# Patient Record
Sex: FEMALE | Race: WHITE | NOT HISPANIC OR LATINO | Employment: OTHER | URBAN - METROPOLITAN AREA
[De-identification: names, ages, dates, MRNs, and addresses within clinical notes are randomized per-mention and may not be internally consistent; named-entity substitution may affect disease eponyms.]

---

## 2017-02-08 ENCOUNTER — OFFICE VISIT (OUTPATIENT)
Dept: FAMILY MEDICINE CLINIC | Facility: CLINIC | Age: 62
End: 2017-02-08

## 2017-02-08 VITALS
SYSTOLIC BLOOD PRESSURE: 146 MMHG | OXYGEN SATURATION: 98 % | HEIGHT: 62 IN | DIASTOLIC BLOOD PRESSURE: 84 MMHG | BODY MASS INDEX: 28.41 KG/M2 | WEIGHT: 154.4 LBS | HEART RATE: 94 BPM

## 2017-02-08 DIAGNOSIS — K92.1 HEMATOCHEZIA: Primary | ICD-10-CM

## 2017-02-08 PROBLEM — Z80.0 FAMILY HISTORY OF COLON CANCER: Status: ACTIVE | Noted: 2017-02-08

## 2017-02-08 LAB
ALBUMIN SERPL-MCNC: 4.6 G/DL (ref 3.5–5.2)
ALBUMIN/GLOB SERPL: 1.1 G/DL
ALP SERPL-CCNC: 66 U/L (ref 39–117)
ALT SERPL-CCNC: 18 U/L (ref 1–33)
AST SERPL-CCNC: 23 U/L (ref 1–32)
BASOPHILS # BLD AUTO: 0.02 10*3/MM3 (ref 0–0.2)
BASOPHILS NFR BLD AUTO: 0.4 % (ref 0–1.5)
BILIRUB SERPL-MCNC: 0.3 MG/DL (ref 0.1–1.2)
BUN SERPL-MCNC: 13 MG/DL (ref 8–23)
BUN/CREAT SERPL: 15.1 (ref 7–25)
CALCIUM SERPL-MCNC: 9.6 MG/DL (ref 8.6–10.5)
CHLORIDE SERPL-SCNC: 100 MMOL/L (ref 98–107)
CO2 SERPL-SCNC: 27.2 MMOL/L (ref 22–29)
CREAT SERPL-MCNC: 0.86 MG/DL (ref 0.57–1)
DEVELOPER EXPIRATION DATE: ABNORMAL
DEVELOPER LOT NUMBER: ABNORMAL
EOSINOPHIL # BLD AUTO: 0.03 10*3/MM3 (ref 0–0.7)
EOSINOPHIL NFR BLD AUTO: 0.6 % (ref 0.3–6.2)
ERYTHROCYTE [DISTWIDTH] IN BLOOD BY AUTOMATED COUNT: 12.7 % (ref 11.7–13)
EXPIRATION DATE: ABNORMAL
FECAL OCCULT BLOOD SCREEN, POC: POSITIVE
GLOBULIN SER CALC-MCNC: 4.1 GM/DL
GLUCOSE SERPL-MCNC: 117 MG/DL (ref 65–99)
HCT VFR BLD AUTO: 34.9 % (ref 35.6–45.5)
HGB BLD-MCNC: 11.5 G/DL (ref 11.9–15.5)
IMM GRANULOCYTES # BLD: 0 10*3/MM3 (ref 0–0.03)
IMM GRANULOCYTES NFR BLD: 0 % (ref 0–0.5)
IRON SATN MFR SERPL: 26 % (ref 20–50)
IRON SERPL-MCNC: 89 MCG/DL (ref 37–145)
LYMPHOCYTES # BLD AUTO: 0.84 10*3/MM3 (ref 0.9–4.8)
LYMPHOCYTES NFR BLD AUTO: 15.5 % (ref 19.6–45.3)
Lab: ABNORMAL
MCH RBC QN AUTO: 31.9 PG (ref 26.9–32)
MCHC RBC AUTO-ENTMCNC: 33 G/DL (ref 32.4–36.3)
MCV RBC AUTO: 96.7 FL (ref 80.5–98.2)
MONOCYTES # BLD AUTO: 0.36 10*3/MM3 (ref 0.2–1.2)
MONOCYTES NFR BLD AUTO: 6.7 % (ref 5–12)
NEGATIVE CONTROL: NEGATIVE
NEUTROPHILS # BLD AUTO: 4.16 10*3/MM3 (ref 1.9–8.1)
NEUTROPHILS NFR BLD AUTO: 76.8 % (ref 42.7–76)
PLATELET # BLD AUTO: 209 10*3/MM3 (ref 140–500)
POSITIVE CONTROL: POSITIVE
POTASSIUM SERPL-SCNC: 3.8 MMOL/L (ref 3.5–5.2)
PROT SERPL-MCNC: 8.7 G/DL (ref 6–8.5)
RBC # BLD AUTO: 3.61 10*6/MM3 (ref 3.9–5.2)
SODIUM SERPL-SCNC: 139 MMOL/L (ref 136–145)
TIBC SERPL-MCNC: 346 MCG/DL
UIBC SERPL-MCNC: 257 MCG/DL
WBC # BLD AUTO: 5.41 10*3/MM3 (ref 4.5–10.7)

## 2017-02-08 PROCEDURE — 99214 OFFICE O/P EST MOD 30 MIN: CPT | Performed by: INTERNAL MEDICINE

## 2017-02-08 PROCEDURE — 82274 ASSAY TEST FOR BLOOD FECAL: CPT | Performed by: INTERNAL MEDICINE

## 2017-02-08 RX ORDER — MIRTAZAPINE 7.5 MG/1
1 TABLET, FILM COATED ORAL DAILY
Refills: 1 | Status: ON HOLD | COMMUNITY
Start: 2017-01-16 | End: 2017-06-16

## 2017-02-08 RX ORDER — ESCITALOPRAM OXALATE 5 MG/1
1 TABLET ORAL DAILY
Refills: 0 | COMMUNITY
Start: 2017-01-16 | End: 2017-07-19 | Stop reason: DRUGHIGH

## 2017-02-08 NOTE — PROGRESS NOTES
Subjective   Augustina Rodgers is a 61 y.o. female who presents today for:    Rectal Bleeding (Quarter sized on Sunday, and today)    History of Present Illness   She notice blood on the tissue paper 3 days ago and again this morning.  She denies copious amounts of blood mixed in her stool or in the toilet bowl.  She has had rectal pain that started with her BM Sunday and has persisted.    She has a history of polyps and her father had colon cancer (survivor).    Her last colonoscopy was in 2015 at which time a small hyperplastic polyp was found in the cecum.    Ms. Rodgers  reports that she has never smoked. She has never used smokeless tobacco. She reports that she does not drink alcohol or use illicit drugs.         Current Outpatient Prescriptions:   •  ALPRAZolam (XANAX) 0.25 MG tablet, Take 0.25 mg by mouth 2 (Two) Times a Day As Needed for anxiety., Disp: , Rfl:   •  Calcium Acetate, Phos Binder, (CALCIUM ACETATE PO), Take  by mouth., Disp: , Rfl:   •  escitalopram (LEXAPRO) 5 MG tablet, Take 1 tablet by mouth Daily., Disp: , Rfl: 0  •  Multiple Vitamins-Minerals (MULTIVITAMIN ADULT PO), Take  by mouth., Disp: , Rfl:   •  mirtazapine (REMERON) 7.5 MG tablet, Take 1 tablet by mouth Daily., Disp: , Rfl: 1      The following portions of the patient's history were reviewed and updated as appropriate: allergies, current medications, past social history and problem list.    Review of Systems   Constitutional: Negative for chills, fatigue and fever.   Respiratory: Negative for shortness of breath.    Cardiovascular: Negative for chest pain.   Gastrointestinal: Positive for blood in stool and rectal pain. Negative for abdominal pain, constipation and diarrhea.   Genitourinary: Negative for difficulty urinating and hematuria.   Neurological: Negative for dizziness and weakness.   Psychiatric/Behavioral: The patient is nervous/anxious.          Objective   Vitals:    02/08/17 1043   BP: 146/84   BP Location: Left arm  "  Patient Position: Sitting   Cuff Size: Adult   Pulse: 94   SpO2: 98%   Weight: 154 lb 6.4 oz (70 kg)   Height: 62\" (157.5 cm)     144/90 on recheck.    Physical Exam  Well-developed, well-nourished, in no acute distress.  Sclerae are anicteric.  Conjunctivae are pink.  No cervical, supraclavicular, or inguinal lymphadenopathy.  Regular rate and rhythm.  Abdomen is soft, nondistended, with no evidence of hepatosplenomegaly or mass.  I'll sounds are normoactive and there is no abdominal bruit appreciated.  She is nontender to deep palpation throughout.  Small skin tag appreciated near the anus.  No external hemorrhoid appreciated.  No anal fissure appreciated.  Normal sphincter tone.  No internal hemorrhoids or masses appreciated in the rectal vault.  There is Hemoccult positive stool present in the vault.  No gross blood is appreciated.    Assessment/Plan   Augustina was seen today for rectal bleeding.    Diagnoses and all orders for this visit:    Hematochezia  -     CBC & Differential  -     Iron Profile  -     POC Occult Blood, Stool-Diagnostic  -     Comprehensive Metabolic Panel  -     Ambulatory Referral to Gastroenterology     pathology report from the polypectomy in 2015 was reviewed with the patient during today's office visit; it was a hyperplastic polyp.  Exam findings were discussed with the patient today as well.  Because of the Hemoccult positive stool, I have asked her to get the labs done as noted above.  We see evidence of a new anemia on her labs, we will ask Dr. Brown to see her sooner rather than later.  Otherwise, I have encouraged her to drink clear fluids, use fiber based stool softener to keep her BM's soft and regular, and see Dr. Brown in a few weeks.  She may contact us in the interim if any new symptoms develop, or if she notices an increase in blood from her rectum.    I tried to reassure her at length regarding the low likelihood of colon cancer causing this problem, especially since her " last colonoscopy being less than 2 years ago was virtually normal.

## 2017-02-09 NOTE — PROGRESS NOTES
No surprises on the labs today.  Your hemoglobin remains slightly low, as it always has been.  Iron stores are all normal.  CMP was unremarkable; I suspect you were not fasting when we cody them, hence the elevated blood sugar.    I recommend you let me know if the bleeding increases.  Otherwise, see Dr. Robles and let him weigh in on the timing of his next colonoscopy.    TAS

## 2017-03-22 ENCOUNTER — OFFICE VISIT (OUTPATIENT)
Dept: GASTROENTEROLOGY | Facility: CLINIC | Age: 62
End: 2017-03-22

## 2017-03-22 VITALS
HEIGHT: 62 IN | DIASTOLIC BLOOD PRESSURE: 82 MMHG | BODY MASS INDEX: 27.97 KG/M2 | WEIGHT: 152 LBS | SYSTOLIC BLOOD PRESSURE: 136 MMHG

## 2017-03-22 DIAGNOSIS — K63.5 COLON POLYPS: ICD-10-CM

## 2017-03-22 DIAGNOSIS — Z80.0 FH: COLON CANCER: ICD-10-CM

## 2017-03-22 DIAGNOSIS — K62.5 RECTAL BLEEDING: Primary | ICD-10-CM

## 2017-03-22 PROCEDURE — 99214 OFFICE O/P EST MOD 30 MIN: CPT | Performed by: INTERNAL MEDICINE

## 2017-03-22 RX ORDER — SODIUM CHLORIDE 0.9 % (FLUSH) 0.9 %
1-10 SYRINGE (ML) INJECTION AS NEEDED
Status: CANCELLED | OUTPATIENT
Start: 2017-03-22

## 2017-03-22 NOTE — PROGRESS NOTES
Chief Complaint   Patient presents with   • blood on toilet paper       History of Present Illness: 62 yo female who had some rectal bleeding in 2/17. She was found to have heme positive stool then by Dr. Olmos.  No more rectal bleeding or melena .  No change in bowel habits. No diarrhea or constipation. No abdominal or chest pain. No nausea or vomiting. Weight unchanged. Denies NSAID use.     Past Medical History:   Diagnosis Date   • Asthma    • Family history of colon cancer 2/8/2017    Father   • History of colon polyps     Dr. Robles   • Hyperplastic colon polyp    • Internal hemorrhoids    • Mitral valve regurgitation 04/03/2012    mild to moderate   • Tricuspid regurgitation 04/03/2012    mild to moderate   • Tubulovillous adenoma of colon        Past Surgical History:   Procedure Laterality Date   • BREAST BIOPSY     • COLONOSCOPY  04/21/2015    hyperplastic polyp   • COLONOSCOPY  07/25/2011    [facility]   • COLONOSCOPY  04/25/2003    inflamed tubulovillous polyp   [facility]         Current Outpatient Prescriptions:   •  ALPRAZolam (XANAX) 0.25 MG tablet, Take 0.25 mg by mouth 2 (Two) Times a Day As Needed for anxiety., Disp: , Rfl:   •  Calcium Acetate, Phos Binder, (CALCIUM ACETATE PO), Take  by mouth., Disp: , Rfl:   •  escitalopram (LEXAPRO) 5 MG tablet, Take 1 tablet by mouth Daily., Disp: , Rfl: 0  •  Multiple Vitamins-Minerals (MULTIVITAMIN ADULT PO), Take  by mouth., Disp: , Rfl:   •  mirtazapine (REMERON) 7.5 MG tablet, Take 1 tablet by mouth Daily., Disp: , Rfl: 1    Allergies   Allergen Reactions   • Ji-1 [Lidocaine] Other (See Comments)     Elevates heart rate   • Cephalosporins    • Kefzol [Cefazolin]        Family History   Problem Relation Age of Onset   • Alzheimer's disease Mother    • Hypertension Mother    • COPD Mother    • Colon cancer Father    • Hypertension Father    • Anxiety disorder Father    • Colon polyps Brother        Social History     Social History   • Marital status:       Spouse name: N/A   • Number of children: N/A   • Years of education: N/A     Occupational History   • Not on file.     Social History Main Topics   • Smoking status: Never Smoker   • Smokeless tobacco: Never Used   • Alcohol use No   • Drug use: No   • Sexual activity: Not on file     Other Topics Concern   • Not on file     Social History Narrative       Review of Systems   Gastrointestinal: Positive for anal bleeding.   All other systems reviewed and are negative.      Vitals:    03/22/17 1001   BP: 136/82       Physical Exam   Constitutional: She is oriented to person, place, and time. She appears well-developed and well-nourished. No distress.   HENT:   Head: Normocephalic and atraumatic. Hair is normal.   Right Ear: Hearing, tympanic membrane, external ear and ear canal normal. No drainage. No decreased hearing is noted.   Left Ear: Hearing, tympanic membrane, external ear and ear canal normal. No decreased hearing is noted.   Nose: No nasal deformity.   Mouth/Throat: Oropharynx is clear and moist.   Eyes: Conjunctivae, EOM and lids are normal. Pupils are equal, round, and reactive to light. Right eye exhibits no discharge. Left eye exhibits no discharge.   Neck: Normal range of motion. Neck supple. No JVD present. No tracheal deviation present. No thyromegaly present.   Cardiovascular: Normal rate, regular rhythm, normal heart sounds, intact distal pulses and normal pulses.  Exam reveals no gallop and no friction rub.    No murmur heard.  Pulmonary/Chest: Effort normal and breath sounds normal. No respiratory distress. She has no wheezes. She has no rales. She exhibits no tenderness.   Abdominal: Soft. Bowel sounds are normal. She exhibits no distension and no mass. There is no tenderness. There is no rebound and no guarding. No hernia.   Musculoskeletal: Normal range of motion. She exhibits no edema, tenderness or deformity.   Lymphadenopathy:     She has no cervical adenopathy.   Neurological:  She is alert and oriented to person, place, and time. She has normal reflexes. She displays normal reflexes. No cranial nerve deficit. She exhibits normal muscle tone. Coordination normal.   Skin: Skin is warm and dry. No rash noted. She is not diaphoretic. No erythema.   Psychiatric: She has a normal mood and affect. Her behavior is normal. Judgment and thought content normal.   Vitals reviewed.      Augustina was seen today for blood on toilet paper.    Diagnoses and all orders for this visit:    Rectal bleeding  -     Case Request; Standing  -     sodium chloride 0.9 % flush 1-10 mL; Infuse 1-10 mL into a venous catheter As Needed for Line Care.  -     Case Request    Colon polyps  -     Case Request; Standing  -     sodium chloride 0.9 % flush 1-10 mL; Infuse 1-10 mL into a venous catheter As Needed for Line Care.  -     Case Request    FH: colon cancer  -     Case Request; Standing  -     sodium chloride 0.9 % flush 1-10 mL; Infuse 1-10 mL into a venous catheter As Needed for Line Care.  -     Case Request    Other orders  -     Implement Anesthesia orders day of procedure.; Standing  -     Obtain informed consent; Standing  -     Verify bowel prep was successful; Standing  -     Give tap water enema if bowel prep was insufficient; Standing  -     Insert Peripheral IV; Standing  -     Saline Lock & Maintain IV Access; Standing     Assessment:  1) h/o colon polyps  2) FH (dad) colon cancer  3) FH (3 bros) colon polyps.  4) Rectal bleeding and heme pos stool    Recommendations:  1) Colonoscopy.    No Follow-up on file.

## 2017-04-12 DIAGNOSIS — Z00.00 ROUTINE GENERAL MEDICAL EXAMINATION AT A HEALTH CARE FACILITY: Primary | ICD-10-CM

## 2017-06-16 ENCOUNTER — ANESTHESIA (OUTPATIENT)
Dept: GASTROENTEROLOGY | Facility: HOSPITAL | Age: 62
End: 2017-06-16

## 2017-06-16 ENCOUNTER — ANESTHESIA EVENT (OUTPATIENT)
Dept: GASTROENTEROLOGY | Facility: HOSPITAL | Age: 62
End: 2017-06-16

## 2017-06-16 ENCOUNTER — HOSPITAL ENCOUNTER (OUTPATIENT)
Facility: HOSPITAL | Age: 62
Setting detail: HOSPITAL OUTPATIENT SURGERY
Discharge: HOME | End: 2017-06-16
Attending: INTERNAL MEDICINE | Admitting: INTERNAL MEDICINE

## 2017-06-16 VITALS
HEIGHT: 62 IN | BODY MASS INDEX: 28.73 KG/M2 | HEART RATE: 64 BPM | WEIGHT: 156.13 LBS | DIASTOLIC BLOOD PRESSURE: 72 MMHG | SYSTOLIC BLOOD PRESSURE: 117 MMHG | TEMPERATURE: 98 F | RESPIRATION RATE: 16 BRPM | OXYGEN SATURATION: 100 %

## 2017-06-16 DIAGNOSIS — Z80.0 FH: COLON CANCER: ICD-10-CM

## 2017-06-16 DIAGNOSIS — K62.5 RECTAL BLEEDING: ICD-10-CM

## 2017-06-16 DIAGNOSIS — K63.5 COLON POLYPS: ICD-10-CM

## 2017-06-16 PROCEDURE — 45380 COLONOSCOPY AND BIOPSY: CPT | Performed by: INTERNAL MEDICINE

## 2017-06-16 PROCEDURE — 88305 TISSUE EXAM BY PATHOLOGIST: CPT | Performed by: INTERNAL MEDICINE

## 2017-06-16 PROCEDURE — 25010000002 PROPOFOL 10 MG/ML EMULSION: Performed by: ANESTHESIOLOGY

## 2017-06-16 RX ORDER — PROPOFOL 10 MG/ML
VIAL (ML) INTRAVENOUS AS NEEDED
Status: DISCONTINUED | OUTPATIENT
Start: 2017-06-16 | End: 2017-06-16 | Stop reason: SURG

## 2017-06-16 RX ORDER — SODIUM CHLORIDE, SODIUM LACTATE, POTASSIUM CHLORIDE, CALCIUM CHLORIDE 600; 310; 30; 20 MG/100ML; MG/100ML; MG/100ML; MG/100ML
1000 INJECTION, SOLUTION INTRAVENOUS CONTINUOUS PRN
Status: DISCONTINUED | OUTPATIENT
Start: 2017-06-16 | End: 2017-06-16 | Stop reason: HOSPADM

## 2017-06-16 RX ORDER — SODIUM CHLORIDE 0.9 % (FLUSH) 0.9 %
3 SYRINGE (ML) INJECTION AS NEEDED
Status: DISCONTINUED | OUTPATIENT
Start: 2017-06-16 | End: 2017-06-16 | Stop reason: HOSPADM

## 2017-06-16 RX ORDER — SODIUM CHLORIDE 0.9 % (FLUSH) 0.9 %
1-10 SYRINGE (ML) INJECTION AS NEEDED
Status: DISCONTINUED | OUTPATIENT
Start: 2017-06-16 | End: 2017-06-16 | Stop reason: HOSPADM

## 2017-06-16 RX ADMIN — PROPOFOL 200 MG: 10 INJECTION, EMULSION INTRAVENOUS at 12:19

## 2017-06-16 RX ADMIN — SODIUM CHLORIDE, POTASSIUM CHLORIDE, SODIUM LACTATE AND CALCIUM CHLORIDE 1000 ML: 600; 310; 30; 20 INJECTION, SOLUTION INTRAVENOUS at 12:12

## 2017-06-16 RX ADMIN — PROPOFOL 200 MG: 10 INJECTION, EMULSION INTRAVENOUS at 12:40

## 2017-06-16 NOTE — ANESTHESIA PREPROCEDURE EVALUATION
Anesthesia Evaluation     Patient summary reviewed and Nursing notes reviewed          Airway   Mallampati: II  TM distance: >3 FB  Neck ROM: full  possible difficult intubation  Dental - normal exam     Pulmonary - normal exam   Cardiovascular - normal exam        Neuro/Psych  GI/Hepatic/Renal/Endo      Musculoskeletal     Abdominal  - normal exam    Bowel sounds: normal.   Substance History      OB/GYN          Other                                        Anesthesia Plan    ASA 1     MAC     Anesthetic plan and risks discussed with patient.

## 2017-06-16 NOTE — PLAN OF CARE
Problem: Patient Care Overview (Adult)  Goal: Adult Individualization and Mutuality  Outcome: Ongoing (interventions implemented as appropriate)  Goal: Discharge Needs Assessment  Outcome: Ongoing (interventions implemented as appropriate)    Problem: GI Endoscopy (Adult)  Goal: Signs and Symptoms of Listed Potential Problems Will be Absent or Manageable (GI Endoscopy)  Outcome: Ongoing (interventions implemented as appropriate)    06/16/17 1142   GI Endoscopy   Problems Assessed (GI Endoscopy) all

## 2017-06-16 NOTE — H&P
Chief Complaint   Patient presents with   • blood on toilet paper         History of Present Illness: 60 yo female who had some rectal bleeding in 2/17. She was found to have heme positive stool then by Dr. Olmos. No more rectal bleeding or melena . No change in bowel habits. No diarrhea or constipation. No abdominal or chest pain. No nausea or vomiting. Weight unchanged. Denies NSAID use.       Medical History         Past Medical History:   Diagnosis Date   • Asthma     • Family history of colon cancer 2/8/2017     Father   • History of colon polyps       Dr. Robles   • Hyperplastic colon polyp     • Internal hemorrhoids     • Mitral valve regurgitation 04/03/2012     mild to moderate   • Tricuspid regurgitation 04/03/2012     mild to moderate   • Tubulovillous adenoma of colon               Surgical History          Past Surgical History:   Procedure Laterality Date   • BREAST BIOPSY       • COLONOSCOPY   04/21/2015     hyperplastic polyp   • COLONOSCOPY   07/25/2011     [facility]   • COLONOSCOPY   04/25/2003     inflamed tubulovillous polyp [facility]               Current Outpatient Prescriptions:   • ALPRAZolam (XANAX) 0.25 MG tablet, Take 0.25 mg by mouth 2 (Two) Times a Day As Needed for anxiety., Disp: , Rfl:   • Calcium Acetate, Phos Binder, (CALCIUM ACETATE PO), Take by mouth., Disp: , Rfl:   • escitalopram (LEXAPRO) 5 MG tablet, Take 1 tablet by mouth Daily., Disp: , Rfl: 0  • Multiple Vitamins-Minerals (MULTIVITAMIN ADULT PO), Take by mouth., Disp: , Rfl:   • mirtazapine (REMERON) 7.5 MG tablet, Take 1 tablet by mouth Daily., Disp: , Rfl: 1           Allergies   Allergen Reactions   • Ji-1 [Lidocaine] Other (See Comments)       Elevates heart rate   • Cephalosporins     • Kefzol [Cefazolin]                 Family History   Problem Relation Age of Onset   • Alzheimer's disease Mother     • Hypertension Mother     • COPD Mother     • Colon cancer Father     • Hypertension Father     • Anxiety disorder  Father     • Colon polyps Brother            Social History    Social History            Social History   • Marital status:        Spouse name: N/A   • Number of children: N/A   • Years of education: N/A          Occupational History   • Not on file.           Social History Main Topics   • Smoking status: Never Smoker   • Smokeless tobacco: Never Used   • Alcohol use No   • Drug use: No   • Sexual activity: Not on file           Other Topics Concern   • Not on file      Social History Narrative            Review of Systems   Gastrointestinal: Positive for anal bleeding.   All other systems reviewed and are negative.            Vitals:     03/22/17 1001   BP: 136/82         Physical Exam   Constitutional: She is oriented to person, place, and time. She appears well-developed and well-nourished. No distress.   HENT:   Head: Normocephalic and atraumatic. Hair is normal.   Right Ear: Hearing, tympanic membrane, external ear and ear canal normal. No drainage. No decreased hearing is noted.   Left Ear: Hearing, tympanic membrane, external ear and ear canal normal. No decreased hearing is noted.   Nose: No nasal deformity.   Mouth/Throat: Oropharynx is clear and moist.   Eyes: Conjunctivae, EOM and lids are normal. Pupils are equal, round, and reactive to light. Right eye exhibits no discharge. Left eye exhibits no discharge.   Neck: Normal range of motion. Neck supple. No JVD present. No tracheal deviation present. No thyromegaly present.   Cardiovascular: Normal rate, regular rhythm, normal heart sounds, intact distal pulses and normal pulses. Exam reveals no gallop and no friction rub.   No murmur heard.  Pulmonary/Chest: Effort normal and breath sounds normal. No respiratory distress. She has no wheezes. She has no rales. She exhibits no tenderness.   Abdominal: Soft. Bowel sounds are normal. She exhibits no distension and no mass. There is no tenderness. There is no rebound and no guarding. No hernia.    Musculoskeletal: Normal range of motion. She exhibits no edema, tenderness or deformity.   Lymphadenopathy:   She has no cervical adenopathy.   Neurological: She is alert and oriented to person, place, and time. She has normal reflexes. She displays normal reflexes. No cranial nerve deficit. She exhibits normal muscle tone. Coordination normal.   Skin: Skin is warm and dry. No rash noted. She is not diaphoretic. No erythema.   Psychiatric: She has a normal mood and affect. Her behavior is normal. Judgment and thought content normal.   Vitals reviewed.        Augustina was seen today for blood on toilet paper.     Diagnoses and all orders for this visit:     Rectal bleeding  - Case Request; Standing  - sodium chloride 0.9 % flush 1-10 mL; Infuse 1-10 mL into a venous catheter As Needed for Line Care.  - Case Request     Colon polyps  - Case Request; Standing  - sodium chloride 0.9 % flush 1-10 mL; Infuse 1-10 mL into a venous catheter As Needed for Line Care.  - Case Request     FH: colon cancer  - Case Request; Standing  - sodium chloride 0.9 % flush 1-10 mL; Infuse 1-10 mL into a venous catheter As Needed for Line Care.  - Case Request     Other orders  - Implement Anesthesia orders day of procedure.; Standing  - Obtain informed consent; Standing  - Verify bowel prep was successful; Standing  - Give tap water enema if bowel prep was insufficient; Standing  - Insert Peripheral IV; Standing  - Saline Lock & Maintain IV Access; Standing     Assessment:  1) h/o colon polyps  2) FH (dad) colon cancer  3) FH (3 bros) colon polyps.  4) Rectal bleeding and heme pos stool     Recommendations:  1) Colonoscopy.     6/16/17 - No change from the above Lara Robles M.D.

## 2017-06-16 NOTE — ANESTHESIA POSTPROCEDURE EVALUATION
Patient: Augustina Rodgers    Procedure Summary     Date Anesthesia Start Anesthesia Stop Room / Location    06/16/17 1219 1244  [facility] /  [facility]       Procedure Diagnosis Surgeon Provider    COLONOSCOPY TO CECUM ANT TERMINAL ILEUM WITH COLD BIOPSY POLYPECTOMIES (N/A ) Rectal bleeding; Colon polyps; FH: colon cancer  (Rectal bleeding [K62.5]; Colon polyps [K63.5]; FH: colon cancer [Z80.0]) MD Jerri Lomax MD          Anesthesia Type: MAC  Last vitals  /72 (06/16/17 1305)    Temp      Pulse 64 (06/16/17 1305)   Resp 16 (06/16/17 1305)    SpO2 100 % (06/16/17 1305)      Post Anesthesia Care and Evaluation    Patient location during evaluation: PHASE II  Patient participation: complete - patient participated  Level of consciousness: awake  Pain score: 0  Pain management: adequate  Airway patency: patent  Anesthetic complications: No anesthetic complications    Cardiovascular status: acceptable  Respiratory status: acceptable  Hydration status: acceptable

## 2017-06-19 LAB
CYTO UR: NORMAL
LAB AP CASE REPORT: NORMAL
Lab: NORMAL
PATH REPORT.FINAL DX SPEC: NORMAL
PATH REPORT.GROSS SPEC: NORMAL

## 2017-06-26 NOTE — PROGRESS NOTES
Tell her that the colon polyps that were removed were not cancerous but two of them were precancereous. I recommend a repeat c/s in one year. mayra

## 2017-06-30 ENCOUNTER — TELEPHONE (OUTPATIENT)
Dept: GASTROENTEROLOGY | Facility: CLINIC | Age: 62
End: 2017-06-30

## 2017-06-30 NOTE — TELEPHONE ENCOUNTER
Call to pt.  Advise per Dr Robles that the colon polyps that were removed were not cancerous, but two were precancerous.  A repeat c/s in 1 yr is recommended.  Pt verb understanding.    C/s for 6/16/18 placed in recall.

## 2017-06-30 NOTE — TELEPHONE ENCOUNTER
----- Message from Xavier Robles MD sent at 6/25/2017  8:51 PM EDT -----  Tell her that the colon polyps that were removed were not cancerous but two of them were precancereous. I recommend a repeat c/s in one year. mayra

## 2017-07-13 LAB
ALBUMIN SERPL-MCNC: 4.4 G/DL (ref 3.6–4.8)
ALBUMIN/GLOB SERPL: 1.2 {RATIO} (ref 1.2–2.2)
ALP SERPL-CCNC: 57 IU/L (ref 39–117)
ALT SERPL-CCNC: 14 IU/L (ref 0–32)
APPEARANCE UR: CLEAR
AST SERPL-CCNC: 22 IU/L (ref 0–40)
BILIRUB SERPL-MCNC: 0.4 MG/DL (ref 0–1.2)
BILIRUB UR QL STRIP: NEGATIVE
BUN SERPL-MCNC: 18 MG/DL (ref 8–27)
BUN/CREAT SERPL: 21 (ref 12–28)
CALCIUM SERPL-MCNC: 9.2 MG/DL (ref 8.7–10.3)
CHLORIDE SERPL-SCNC: 100 MMOL/L (ref 96–106)
CHOLEST SERPL-MCNC: 205 MG/DL (ref 100–199)
CO2 SERPL-SCNC: 22 MMOL/L (ref 18–29)
COLOR UR: YELLOW
CREAT SERPL-MCNC: 0.84 MG/DL (ref 0.57–1)
ERYTHROCYTE [DISTWIDTH] IN BLOOD BY AUTOMATED COUNT: 13.1 % (ref 12.3–15.4)
GLOBULIN SER CALC-MCNC: 3.7 G/DL (ref 1.5–4.5)
GLUCOSE SERPL-MCNC: 104 MG/DL (ref 65–99)
GLUCOSE UR QL: NEGATIVE
HCT VFR BLD AUTO: 33.8 % (ref 34–46.6)
HDLC SERPL-MCNC: 48 MG/DL
HGB BLD-MCNC: 11.5 G/DL (ref 11.1–15.9)
HGB UR QL STRIP: NEGATIVE
KETONES UR QL STRIP: NEGATIVE
LDLC SERPL CALC-MCNC: 137 MG/DL (ref 0–99)
LEUKOCYTE ESTERASE UR QL STRIP: NEGATIVE
MCH RBC QN AUTO: 32 PG (ref 26.6–33)
MCHC RBC AUTO-ENTMCNC: 34 G/DL (ref 31.5–35.7)
MCV RBC AUTO: 94 FL (ref 79–97)
MICRO URNS: NORMAL
NITRITE UR QL STRIP: NEGATIVE
PH UR STRIP: 6 [PH] (ref 5–7.5)
PLATELET # BLD AUTO: 197 X10E3/UL (ref 150–379)
POTASSIUM SERPL-SCNC: 4.1 MMOL/L (ref 3.5–5.2)
PROT SERPL-MCNC: 8.1 G/DL (ref 6–8.5)
PROT UR QL STRIP: NEGATIVE
RBC # BLD AUTO: 3.59 X10E6/UL (ref 3.77–5.28)
SODIUM SERPL-SCNC: 138 MMOL/L (ref 134–144)
SP GR UR: 1.02 (ref 1–1.03)
TRIGL SERPL-MCNC: 100 MG/DL (ref 0–149)
UROBILINOGEN UR STRIP-MCNC: 0.2 MG/DL (ref 0.2–1)
VLDLC SERPL CALC-MCNC: 20 MG/DL (ref 5–40)
WBC # BLD AUTO: 4.4 X10E3/UL (ref 3.4–10.8)

## 2017-07-19 ENCOUNTER — OFFICE VISIT (OUTPATIENT)
Dept: FAMILY MEDICINE CLINIC | Facility: CLINIC | Age: 62
End: 2017-07-19

## 2017-07-19 VITALS
SYSTOLIC BLOOD PRESSURE: 132 MMHG | BODY MASS INDEX: 29.24 KG/M2 | HEIGHT: 62 IN | OXYGEN SATURATION: 95 % | DIASTOLIC BLOOD PRESSURE: 84 MMHG | WEIGHT: 158.9 LBS | HEART RATE: 70 BPM

## 2017-07-19 DIAGNOSIS — Z00.00 ROUTINE GENERAL MEDICAL EXAMINATION AT HEALTH CARE FACILITY: Primary | ICD-10-CM

## 2017-07-19 PROCEDURE — 99396 PREV VISIT EST AGE 40-64: CPT | Performed by: INTERNAL MEDICINE

## 2017-07-19 RX ORDER — TRAZODONE HYDROCHLORIDE 50 MG/1
TABLET ORAL
Refills: 2 | COMMUNITY
Start: 2017-04-17 | End: 2018-04-30

## 2017-07-19 RX ORDER — ESCITALOPRAM OXALATE 10 MG/1
TABLET ORAL
Refills: 1 | COMMUNITY
Start: 2017-07-05 | End: 2018-09-28 | Stop reason: SDUPTHER

## 2018-03-20 ENCOUNTER — TRANSCRIBE ORDERS (OUTPATIENT)
Dept: ADMINISTRATIVE | Facility: HOSPITAL | Age: 63
End: 2018-03-20

## 2018-03-20 DIAGNOSIS — Z12.31 VISIT FOR SCREENING MAMMOGRAM: Primary | ICD-10-CM

## 2018-04-09 ENCOUNTER — HOSPITAL ENCOUNTER (OUTPATIENT)
Dept: MAMMOGRAPHY | Facility: HOSPITAL | Age: 63
Discharge: HOME | End: 2018-04-09
Attending: OBSTETRICS & GYNECOLOGY | Admitting: OBSTETRICS & GYNECOLOGY

## 2018-04-09 DIAGNOSIS — Z12.31 VISIT FOR SCREENING MAMMOGRAM: ICD-10-CM

## 2018-04-09 PROCEDURE — 77067 SCR MAMMO BI INCL CAD: CPT

## 2018-04-09 PROCEDURE — 77063 BREAST TOMOSYNTHESIS BI: CPT

## 2018-04-30 ENCOUNTER — OFFICE VISIT (OUTPATIENT)
Dept: OBSTETRICS AND GYNECOLOGY | Age: 63
End: 2018-04-30

## 2018-04-30 VITALS — WEIGHT: 170 LBS | BODY MASS INDEX: 31.09 KG/M2 | SYSTOLIC BLOOD PRESSURE: 124 MMHG | DIASTOLIC BLOOD PRESSURE: 70 MMHG

## 2018-04-30 DIAGNOSIS — Z00.00 ANNUAL PHYSICAL EXAM: Primary | ICD-10-CM

## 2018-04-30 DIAGNOSIS — F41.9 ANXIETY: ICD-10-CM

## 2018-04-30 PROCEDURE — 99396 PREV VISIT EST AGE 40-64: CPT | Performed by: OBSTETRICS & GYNECOLOGY

## 2018-04-30 NOTE — PROGRESS NOTES
Subjective   Augustina Rodgers is a 63 y.o. female is being seen today for an annual exam.  Chief Complaint   Patient presents with   • Gynecologic Exam     Last Pap 16 Neg Colon 2017 Dexa    .    History of Present Illness    The following portions of the patient's history were reviewed and updated as appropriate: allergies, current medications, past family history, past medical history, past social history, past surgical history and problem list.    Vitals:    18 1043   BP: 124/70       PAST MEDICAL HISTORY  Past Medical History:   Diagnosis Date   • Asthma    • Family history of colon cancer 2017    Father   • History of colon polyps     Dr. Robles   • Hyperplastic colon polyp    • Internal hemorrhoids    • Mitral valve regurgitation 2012    mild to moderate   • Posterior vitreous detachment of right eye 2016    Dr. Bev Wilder   • Tricuspid regurgitation 2012    mild to moderate   • Tubulovillous adenoma of colon      OB History      Para Term  AB Living    2 2 2       SAB TAB Ectopic Molar Multiple Live Births                 Past Surgical History:   Procedure Laterality Date   • BREAST BIOPSY     • BREAST CYST ASPIRATION     •  SECTION     • COLONOSCOPY  2015    hyperplastic polyp   • [facility]OSCOPY  2011    [facility]   • COLONOSCOPY  2003    inflamed tubulovillous polyp   [facility]   • COLONOSCOPY N/A 2017    Procedure: COLONOSCOPY TO CECUM ANT TERMINAL ILEUM WITH COLD BIOPSY POLYPECTOMIES;  Surgeon: Xavier Robles MD;  Location: [facility] END[facility];  Service:    • COLONOSCOPY  2017    Dr. Robles [facility] 3 polyps   • FERTILITY SURGERY      laproscopic   • TONSILLECTOMY       Family History   Problem Relation Age of Onset   • Alzheimer's disease Mother    • Hypertension Mother    • COPD Mother    • Colon cancer Father    • Hypertension Father    • Anxiety disorder Father    • Colon polyps Brother    • Breast cancer Paternal Grandmother       History   Smoking Status   • Never Smoker   Smokeless Tobacco   • Never Used       Current Outpatient Prescriptions:   •  ALPRAZolam (XANAX) 0.25 MG tablet, Take 0.25 mg by mouth 2 (Two) Times a Day As Needed for anxiety., Disp: , Rfl:   •  Calcium Acetate, Phos Binder, (CALCIUM ACETATE PO), Take  by mouth., Disp: , Rfl:   •  escitalopram (LEXAPRO) 10 MG tablet, TK 1 T PO QD, Disp: , Rfl: 1  •  Multiple Vitamins-Minerals (MULTIVITAMIN ADULT PO), Take  by mouth., Disp: , Rfl:   •  traZODone (DESYREL) 50 MG tablet, TK SS TO ONE T PO HS UTD, Disp: , Rfl: 2  Immunization History   Administered Date(s) Administered   • DTaP 03/09/2009   • Influenza, Quadrivalent 10/04/2016, 10/05/2017       Review of Systems    Objective   Physical Exam      Assessment/Plan   There are no diagnoses linked to this encounter.

## 2018-04-30 NOTE — PROGRESS NOTES
Subjective   Augustina Rodgers is a 63 y.o. female is being seen today for   Chief Complaint   Patient presents with   • Gynecologic Exam     Last Pap 16 Neg Colon 2017 Dexa 2016   .    History of Present Illness  Patient is here for an annual exam.  Overall she doing very well reports no new problems this year or today.  She did have a colonoscopy last year showing multiple polyps including a tubular adenoma so she is going back this year for a repeat.  No new family history suffer the additional grandchildren she has to another one on the way.  One of her daughters will be moving to [place] for 6 years as her  is in a orthopedic residency in fellowship.  She exercises needs fairly well her bowels and bladder also work well.  No other complaints  The following portions of the patient's history were reviewed and updated as appropriate: allergies, current medications, past family history, past medical history, past social history, past surgical history and problem list.    Vitals:    18 1043   BP: 124/70       PAST MEDICAL HISTORY  Past Medical History:   Diagnosis Date   • Asthma    • Family history of colon cancer 2017    Father   • History of colon polyps     Dr. Robles   • Hyperplastic colon polyp    • Internal hemorrhoids    • Mitral valve regurgitation 2012    mild to moderate   • Posterior vitreous detachment of right eye 2016    Dr. Bev Wilder   • Tricuspid regurgitation 2012    mild to moderate   • Tubulovillous adenoma of colon      OB History      Para Term  AB Living    2 2 2       SAB TAB Ectopic Molar Multiple Live Births                 Past Surgical History:   Procedure Laterality Date   • BREAST BIOPSY     • BREAST CYST ASPIRATION     •  SECTION     • COLONOSCOPY  2015    hyperplastic polyp   • COLONOSCOPY  2011    [facility]   • COLONOSCOPY  2003    inflamed tubulovillous polyp   [facility]   • COLONOSCOPY N/A 2017     Procedure: COLONOSCOPY TO CECUM ANT TERMINAL ILEUM WITH COLD BIOPSY POLYPECTOMIES;  Surgeon: Xavier Robles MD;  Location: [facility] ENDOSCOPY;  Service:    • COLONOSCOPY  06/16/2017    Dr. Robles [facility] 3 polyps   • FERTILITY SURGERY      laproscopic   • TONSILLECTOMY       Family History   Problem Relation Age of Onset   • Alzheimer's disease Mother    • Hypertension Mother    • COPD Mother    • Colon cancer Father    • Hypertension Father    • Anxiety disorder Father    • Colon polyps Brother    • Breast cancer Paternal Grandmother      History   Smoking Status   • Never Smoker   Smokeless Tobacco   • Never Used       Current Outpatient Prescriptions:   •  ALPRAZolam (XANAX) 0.25 MG tablet, Take 0.25 mg by mouth 2 (Two) Times a Day As Needed for anxiety., Disp: , Rfl:   •  Calcium Acetate, Phos Binder, (CALCIUM ACETATE PO), Take  by mouth., Disp: , Rfl:   •  escitalopram (LEXAPRO) 10 MG tablet, TK 1 T PO QD, Disp: , Rfl: 1  •  Multiple Vitamins-Minerals (MULTIVITAMIN ADULT PO), Take  by mouth., Disp: , Rfl:   Immunization History   Administered Date(s) Administered   • DTaP 03/09/2009   • Influenza, Quadrivalent 10/04/2016, 10/05/2017       Review of Systems   Constitutional: Negative for chills, fatigue, fever and unexpected weight change.   Respiratory: Negative for shortness of breath and wheezing.    Cardiovascular: Negative for chest pain.   Gastrointestinal: Negative for abdominal distention, abdominal pain, blood in stool, constipation, diarrhea and nausea.   Genitourinary: Negative for difficulty urinating, dyspareunia, dysuria, frequency, hematuria, menstrual problem, pelvic pain, urgency and vaginal discharge.   Skin: Negative for rash.   Psychiatric/Behavioral: The patient is nervous/anxious.        Objective   Physical Exam   Constitutional: She is oriented to person, place, and time. Vital signs are normal. She appears well-developed and well-nourished.   Neck: No thyromegaly present.   Cardiovascular: Normal  rate, regular rhythm and normal heart sounds.    Pulmonary/Chest: Effort normal. Right breast exhibits no inverted nipple, no mass, no nipple discharge, no skin change and no tenderness. Left breast exhibits no inverted nipple, no mass, no nipple discharge, no skin change and no tenderness. Breasts are symmetrical. There is no breast swelling.   Abdominal: Soft.   Genitourinary: Vagina normal and uterus normal. No breast tenderness, discharge or bleeding. Pelvic exam was performed with patient supine. No labial fusion. There is no rash, tenderness, lesion or injury on the right labia. There is no rash, tenderness, lesion or injury on the left labia. Cervix exhibits no motion tenderness, no discharge and no friability. Right adnexum displays no mass, no tenderness and no fullness. Left adnexum displays no mass, no tenderness and no fullness.   Neurological: She is alert and oriented to person, place, and time.   Psychiatric: She has a normal mood and affect.   Vitals reviewed.        Assessment/Plan   Augustina was seen today for gynecologic exam.    Diagnoses and all orders for this visit:    Annual physical exam    Anxiety        Normal exam today.  Pap smear will be due next year.:colon  As mentioned above up-to-date bone density and 16 up-to-date and mammogram up-to-date.  Come back in a year.  And again diet and exercise were discussed

## 2018-06-27 DIAGNOSIS — Z00.00 ROUTINE GENERAL MEDICAL EXAMINATION AT A HEALTH CARE FACILITY: Primary | ICD-10-CM

## 2018-07-15 ENCOUNTER — RESULTS ENCOUNTER (OUTPATIENT)
Dept: FAMILY MEDICINE CLINIC | Facility: CLINIC | Age: 63
End: 2018-07-15

## 2018-07-15 DIAGNOSIS — Z00.00 ROUTINE GENERAL MEDICAL EXAMINATION AT A HEALTH CARE FACILITY: ICD-10-CM

## 2018-09-28 ENCOUNTER — OFFICE VISIT (OUTPATIENT)
Dept: FAMILY MEDICINE CLINIC | Facility: CLINIC | Age: 63
End: 2018-09-28

## 2018-09-28 VITALS
BODY MASS INDEX: 34.23 KG/M2 | OXYGEN SATURATION: 97 % | RESPIRATION RATE: 22 BRPM | DIASTOLIC BLOOD PRESSURE: 80 MMHG | SYSTOLIC BLOOD PRESSURE: 137 MMHG | HEART RATE: 69 BPM | HEIGHT: 62 IN | WEIGHT: 186 LBS | TEMPERATURE: 97.8 F

## 2018-09-28 DIAGNOSIS — F41.9 ANXIETY: Primary | ICD-10-CM

## 2018-09-28 PROCEDURE — 99213 OFFICE O/P EST LOW 20 MIN: CPT | Performed by: NURSE PRACTITIONER

## 2018-09-28 RX ORDER — ESCITALOPRAM OXALATE 10 MG/1
10 TABLET ORAL DAILY
Qty: 90 TABLET | Refills: 1 | Status: SHIPPED | OUTPATIENT
Start: 2018-09-28 | End: 2019-03-22 | Stop reason: SDUPTHER

## 2018-09-28 RX ORDER — INFLUENZA A VIRUS A/MICHIGAN/45/2015 X-275 (H1N1) ANTIGEN (FORMALDEHYDE INACTIVATED), INFLUENZA A VIRUS A/SINGAPORE/INFIMH-16-0019/2016 IVR-186 (H3N2) ANTIGEN (FORMALDEHYDE INACTIVATED), INFLUENZA B VIRUS B/PHUKET/3073/2013 ANTIGEN (FORMALDEHYDE INACTIVATED), AND INFLUENZA B VIRUS B/MARYLAND/15/2016 BX-69A ANTIGEN (FORMALDEHYDE INACTIVATED) 15; 15; 15; 15 UG/.5ML; UG/.5ML; UG/.5ML; UG/.5ML
INJECTION, SUSPENSION INTRAMUSCULAR
Refills: 0 | COMMUNITY
Start: 2018-09-23 | End: 2018-12-04

## 2018-09-28 RX ORDER — HEPATITIS A VACCINE 1440 [IU]/ML
INJECTION, SUSPENSION INTRAMUSCULAR
Refills: 0 | COMMUNITY
Start: 2018-09-23 | End: 2018-12-04

## 2018-10-01 NOTE — PROGRESS NOTES
Subjective   Augustina Rodgers is a 63 y.o. female.     Chief Complaint   Patient presents with   • Anxiety     Ms Rodgers presents today to get her anxiety medications filled. She had been under the care of Dr. Lopez whom recently retired. She is a patient of Dr. Olmos, this is a new problem to me. She is out of Lexapro and needs a refill on  this medication before her next visit with Dr. Olmos.     I have reviewed the patient's medical history in detail and updated the computerized patient record.    The following portions of the patient's history were reviewed and updated as appropriate: allergies, current medications, past family history, past medical history, past social history, past surgical history and problem list.       Current Outpatient Prescriptions:   •  ALPRAZolam (XANAX) 0.25 MG tablet, Take 0.25 mg by mouth 2 (Two) Times a Day As Needed for anxiety., Disp: , Rfl:   •  BOOSTRIX 5-2.5-18.5 injection, ADM 0.5ML IM UTD, Disp: , Rfl: 0  •  Calcium Acetate, Phos Binder, (CALCIUM ACETATE PO), Take  by mouth., Disp: , Rfl:   •  escitalopram (LEXAPRO) 10 MG tablet, Take 1 tablet by mouth Daily., Disp: 90 tablet, Rfl: 1  •  FLUZONE QUADRIVALENT 0.5 ML suspension prefilled syringe injection, ADM 0.5ML IM UTD, Disp: , Rfl: 0  •  HAVRIX 1440 EL U/ML vaccine, ADM 1ML IM UTD, Disp: , Rfl: 0  •  Multiple Vitamins-Minerals (MULTIVITAMIN ADULT PO), Take  by mouth., Disp: , Rfl:     Review of Systems   Constitutional: Negative.    Respiratory: Negative.    Cardiovascular: Negative.    Neurological: Negative.    Psychiatric/Behavioral: The patient is nervous/anxious.        Objective    Vitals:    09/28/18 1421   BP: 137/80   Pulse: 69   Resp: 22   Temp: 97.8 °F (36.6 °C)   SpO2: 97%     Physical Exam   Constitutional: She is oriented to person, place, and time. She appears well-developed and well-nourished.   Cardiovascular: Normal rate, regular rhythm and normal heart sounds.    Neurological: She is alert and  oriented to person, place, and time.   Skin: Skin is warm and dry.   Psychiatric: She has a normal mood and affect. Her behavior is normal. Judgment and thought content normal.   Vitals reviewed.      Assessment/Plan   Augustina was seen today for anxiety.    Diagnoses and all orders for this visit:    Anxiety    Other orders  -     escitalopram (LEXAPRO) 10 MG tablet; Take 1 tablet by mouth Daily.    1. Her mood and affect are bright. She has no concerns.  2. I have refilled her Escitalopram 10 mg daily x 90 days.  3. She is to follow up as scheduled with Dr. Olmos in December.

## 2018-11-27 DIAGNOSIS — Z00.00 ROUTINE GENERAL MEDICAL EXAMINATION AT HEALTH CARE FACILITY: Primary | ICD-10-CM

## 2018-11-28 ENCOUNTER — RESULTS ENCOUNTER (OUTPATIENT)
Dept: FAMILY MEDICINE CLINIC | Facility: CLINIC | Age: 63
End: 2018-11-28

## 2018-11-28 DIAGNOSIS — Z00.00 ROUTINE GENERAL MEDICAL EXAMINATION AT HEALTH CARE FACILITY: ICD-10-CM

## 2018-11-30 LAB
ALBUMIN SERPL-MCNC: 4.3 G/DL (ref 3.6–4.8)
ALBUMIN/GLOB SERPL: 1.1 {RATIO} (ref 1.2–2.2)
ALP SERPL-CCNC: 64 IU/L (ref 39–117)
ALT SERPL-CCNC: 30 IU/L (ref 0–32)
APPEARANCE UR: CLEAR
AST SERPL-CCNC: 30 IU/L (ref 0–40)
BILIRUB SERPL-MCNC: 0.4 MG/DL (ref 0–1.2)
BILIRUB UR QL STRIP: NEGATIVE
BUN SERPL-MCNC: 17 MG/DL (ref 8–27)
BUN/CREAT SERPL: 20 (ref 12–28)
CALCIUM SERPL-MCNC: 9.3 MG/DL (ref 8.7–10.3)
CHLORIDE SERPL-SCNC: 102 MMOL/L (ref 96–106)
CHOLEST SERPL-MCNC: 205 MG/DL (ref 100–199)
CO2 SERPL-SCNC: 23 MMOL/L (ref 20–29)
COLOR UR: YELLOW
CREAT SERPL-MCNC: 0.87 MG/DL (ref 0.57–1)
ERYTHROCYTE [DISTWIDTH] IN BLOOD BY AUTOMATED COUNT: 13.3 % (ref 12.3–15.4)
GLOBULIN SER CALC-MCNC: 3.8 G/DL (ref 1.5–4.5)
GLUCOSE SERPL-MCNC: 107 MG/DL (ref 65–99)
GLUCOSE UR QL: NEGATIVE
HCT VFR BLD AUTO: 35.6 % (ref 34–46.6)
HDLC SERPL-MCNC: 38 MG/DL
HGB BLD-MCNC: 11.9 G/DL (ref 11.1–15.9)
HGB UR QL STRIP: NEGATIVE
KETONES UR QL STRIP: NEGATIVE
LDLC SERPL CALC-MCNC: 138 MG/DL (ref 0–99)
LEUKOCYTE ESTERASE UR QL STRIP: NEGATIVE
MCH RBC QN AUTO: 31.8 PG (ref 26.6–33)
MCHC RBC AUTO-ENTMCNC: 33.4 G/DL (ref 31.5–35.7)
MCV RBC AUTO: 95 FL (ref 79–97)
MICRO URNS: NORMAL
NITRITE UR QL STRIP: NEGATIVE
PH UR STRIP: 6.5 [PH] (ref 5–7.5)
PLATELET # BLD AUTO: 218 X10E3/UL (ref 150–379)
POTASSIUM SERPL-SCNC: 5 MMOL/L (ref 3.5–5.2)
PROT SERPL-MCNC: 8.1 G/DL (ref 6–8.5)
PROT UR QL STRIP: NEGATIVE
RBC # BLD AUTO: 3.74 X10E6/UL (ref 3.77–5.28)
SODIUM SERPL-SCNC: 140 MMOL/L (ref 134–144)
SP GR UR: 1.01 (ref 1–1.03)
TRIGL SERPL-MCNC: 143 MG/DL (ref 0–149)
UROBILINOGEN UR STRIP-MCNC: 0.2 MG/DL (ref 0.2–1)
VLDLC SERPL CALC-MCNC: 29 MG/DL (ref 5–40)
WBC # BLD AUTO: 3.6 X10E3/UL (ref 3.4–10.8)

## 2018-12-04 ENCOUNTER — OFFICE VISIT (OUTPATIENT)
Dept: FAMILY MEDICINE CLINIC | Facility: CLINIC | Age: 63
End: 2018-12-04

## 2018-12-04 VITALS
SYSTOLIC BLOOD PRESSURE: 118 MMHG | WEIGHT: 187 LBS | HEIGHT: 62 IN | OXYGEN SATURATION: 98 % | HEART RATE: 73 BPM | DIASTOLIC BLOOD PRESSURE: 82 MMHG | BODY MASS INDEX: 34.41 KG/M2

## 2018-12-04 DIAGNOSIS — Z12.11 COLON CANCER SCREENING: ICD-10-CM

## 2018-12-04 DIAGNOSIS — E78.5 DYSLIPIDEMIA: ICD-10-CM

## 2018-12-04 DIAGNOSIS — Z00.00 ROUTINE GENERAL MEDICAL EXAMINATION AT HEALTH CARE FACILITY: Primary | ICD-10-CM

## 2018-12-04 PROCEDURE — 99396 PREV VISIT EST AGE 40-64: CPT | Performed by: INTERNAL MEDICINE

## 2018-12-04 RX ORDER — OMEPRAZOLE 20 MG/1
20 CAPSULE, DELAYED RELEASE ORAL AS NEEDED
COMMUNITY
End: 2019-03-22

## 2018-12-04 RX ORDER — CALCIUM CARBONATE 200(500)MG
1 TABLET,CHEWABLE ORAL AS NEEDED
COMMUNITY
End: 2021-06-10

## 2018-12-04 NOTE — PATIENT INSTRUCTIONS
Personal Prevention Plan of Service     Date of Office Visit:  2018  Encounter Provider:  Attila Olmos MD  Place of Service:  [facility] INTERNAL MEDICINE  Patient Name: Augustina Rodgers  :  1955    This lists the preventive care services that should be considered, and provides dates of when you are due. Items listed as completed are up-to-date and do not require any further intervention.    Health Maintenance   Topic Date Due   • HEPATITIS A VACCINE ADULT (1 of 2) 3//   • SHINGLES VACCINE (SHINGRIX 2 shot series) Now and in 2-6 months   • HEPATITIS C SCREENING  With next labs   • COLONOSCOPY  Due now; call Dr. Robles to schedule.   • ANNUAL PHYSICAL  2019   • PAP SMEAR  2019 / per Dr. Camacho   • MAMMOGRAM  2020   • TDAP/TD VACCINES (3 - Td) 2028   • INFLUENZA VACCINE  Completed       Return in about 1 year (around 2019) for Preventive visit - FASTING LABS PRIOR.

## 2018-12-04 NOTE — PROGRESS NOTES
"Subjective   Augustina Rodgers is a 63 y.o. female who presents today for:    Annual Exam (PHE & review labs)    History of Present Illness     Immunization History   Administered Date(s) Administered   • Flu Mist 10/04/2016, 10/05/2017   • Hepatitis A 09/23/2018   • Influenza, Unspecified 09/23/2018   • Tdap 03/09/2009, 09/23/2018     She sees Dr. Camacho for her gynecologic evaluations. She is up to date regarding her colonoscopies.    Ms. Rodgers  reports that  has never smoked. she has never used smokeless tobacco. She reports that she drinks alcohol. She reports that she does not use drugs.     Allergies   Allergen Reactions   • Ji-1 [Lidocaine] Other (See Comments)     Elevates heart rate   • Cephalosporins    • Kefzol [Cefazolin]        Current Outpatient Medications:   •  ALPRAZolam (XANAX) 0.25 MG tablet, Take 0.25 mg by mouth 2 (Two) Times a Day As Needed for anxiety., Disp: , Rfl:   •  Calcium Acetate, Phos Binder, (CALCIUM ACETATE PO), Take 1 tablet by mouth Daily., Disp: , Rfl:   •  calcium carbonate (TUMS) 500 MG chewable tablet, Chew 1 tablet As Needed for Indigestion or Heartburn., Disp: , Rfl:   •  escitalopram (LEXAPRO) 10 MG tablet, Take 1 tablet by mouth Daily., Disp: 90 tablet, Rfl: 1  •  Multiple Vitamins-Minerals (MULTIVITAMIN ADULT PO), Take  by mouth., Disp: , Rfl:   •  omeprazole (priLOSEC) 20 MG capsule, Take 20 mg by mouth As Needed., Disp: , Rfl:       Review of Systems   Gastrointestinal: Negative for abdominal pain, blood in stool, constipation and diarrhea.        Reflux flaring; no dysphagia; mild, rare odynophagia.   Psychiatric/Behavioral: The patient is not nervous/anxious.    All other systems reviewed and are negative.        Objective   Vitals:    12/04/18 1555   BP: 118/82   BP Location: Right arm   Patient Position: Sitting   Cuff Size: Large Adult   Pulse: 73   SpO2: 98%   Weight: 84.8 kg (187 lb)   Height: 157.5 cm (62\")     Physical Exam   Constitutional: She is oriented to " person, place, and time. She appears well-developed and well-nourished.   Eyes: Conjunctivae are normal. No scleral icterus.   Neck: Carotid bruit is not present. No thyroid mass and no thyromegaly present.   Cardiovascular: Normal rate, regular rhythm, normal heart sounds and intact distal pulses.   No pedal edema.   Pulmonary/Chest: Effort normal and breath sounds normal.   Abdominal: Soft. Bowel sounds are normal. She exhibits no abdominal bruit. There is no tenderness.   Neurological: She is alert and oriented to person, place, and time. She has normal strength.   Psychiatric: She has a normal mood and affect.           Augustina was seen today for annual exam.    Diagnoses and all orders for this visit:    Routine general medical examination at [facility] facility    Colon cancer screening  Comments:  Call Dr. Robles.    Dyslipidemia    We discussed age-appropriate HM issues, including the need for diet changes and resumption of regular exercise.  This should help with the mild dyslipidemia and slightly elevated blood sugar seen on labs done prior to today's visit and reviewed with the patient today.   Rest of the labs looked good.    Tetanus booster is up-to-date. I recommended the shingles vaccine to her today. She will get the second hepatitis A shot at the six-month amira (end of March). We also discussed the use of sunscreen and seatbelts.

## 2019-03-22 ENCOUNTER — OFFICE VISIT (OUTPATIENT)
Dept: FAMILY MEDICINE CLINIC | Facility: CLINIC | Age: 64
End: 2019-03-22

## 2019-03-22 VITALS
OXYGEN SATURATION: 98 % | HEART RATE: 78 BPM | RESPIRATION RATE: 16 BRPM | TEMPERATURE: 98.2 F | SYSTOLIC BLOOD PRESSURE: 120 MMHG | BODY MASS INDEX: 32.57 KG/M2 | WEIGHT: 177 LBS | DIASTOLIC BLOOD PRESSURE: 80 MMHG | HEIGHT: 62 IN

## 2019-03-22 DIAGNOSIS — F41.9 ANXIETY: Primary | ICD-10-CM

## 2019-03-22 DIAGNOSIS — F32.5 MAJOR DEPRESSIVE DISORDER WITH SINGLE EPISODE, IN FULL REMISSION (HCC): ICD-10-CM

## 2019-03-22 PROCEDURE — 99204 OFFICE O/P NEW MOD 45 MIN: CPT | Performed by: INTERNAL MEDICINE

## 2019-03-22 RX ORDER — ESCITALOPRAM OXALATE 10 MG/1
10 TABLET ORAL DAILY
Qty: 90 TABLET | Refills: 2 | Status: SHIPPED | OUTPATIENT
Start: 2019-03-22 | End: 2019-09-30

## 2019-03-22 NOTE — PROGRESS NOTES
Subjective   Augustina Rodgers is a 63 y.o. female. Patient is here today for   Chief Complaint   Patient presents with   • Establish Care     NP   • Anxiety   • Depression          Vitals:    03/22/19 1113   BP: 120/80   Pulse: 78   Resp: 16   Temp: 98.2 °F (36.8 °C)   SpO2: 98%       Past Medical History:   Diagnosis Date   • Anxiety    • Asthma    • Depression    • Family history of colon cancer 2/8/2017    Father   • Heart murmur    • History of colon polyps     Dr. Robles   • Hyperplastic colon polyp    • Internal hemorrhoids    • Mitral valve regurgitation 04/03/2012    mild to moderate   • Posterior vitreous detachment of right eye 09/2016    Dr. Bev Wilder   • Tricuspid regurgitation 04/03/2012    mild to moderate   • Tubulovillous adenoma of colon       Allergies   Allergen Reactions   • Ji-1 [Lidocaine] Other (See Comments)     Elevates heart rate   • Cephalosporins    • Kefzol [Cefazolin]       Social History     Socioeconomic History   • Marital status:      Spouse name: Not on file   • Number of children: Not on file   • Years of education: Not on file   • Highest education level: Not on file   Tobacco Use   • Smoking status: Never Smoker   • Smokeless tobacco: Never Used   Substance and Sexual Activity   • Alcohol use: Yes     Comment: socially   • Drug use: No   • Sexual activity: Defer        Current Outpatient Medications:   •  ALPRAZolam (XANAX) 0.25 MG tablet, Take 0.25 mg by mouth 2 (Two) Times a Day As Needed for anxiety., Disp: , Rfl:   •  calcium carbonate (TUMS) 500 MG chewable tablet, Chew 1 tablet As Needed for Indigestion or Heartburn., Disp: , Rfl:   •  escitalopram (LEXAPRO) 10 MG tablet, Take 1 tablet by mouth Daily., Disp: 90 tablet, Rfl: 2  •  Multiple Vitamins-Minerals (MULTIVITAMIN ADULT PO), Take  by mouth., Disp: , Rfl:      Objective     This pleasant patient is here to meet me for the first time.  Her physician has recently moved his practice to the [facility].    She is a retired nurse.    She has a past medical history significant for depression and situational anxiety.  She takes Lexapro regularly.  She takes alprazolam very sparingly and appropriately.    She tells me that she feels well today.         Review of Systems   Constitutional: Negative.    HENT: Negative.    Respiratory: Negative.    Cardiovascular: Negative.    Psychiatric/Behavioral: Negative.        Physical Exam   Constitutional: She is oriented to person, place, and time. She appears well-developed and well-nourished.   HENT:   Head: Normocephalic and atraumatic.   Cardiovascular: Normal rate and regular rhythm.   Pulmonary/Chest: Effort normal and breath sounds normal.   Neurological: She is alert and oriented to person, place, and time.   Psychiatric: She has a normal mood and affect. Her behavior is normal.   Nursing note and vitals reviewed.        Problem List Items Addressed This Visit        Other    Anxiety - Primary    Major depressive disorder with single episode, in full remission (CMS/[facility])    Relevant Medications    escitalopram (LEXAPRO) 10 MG tablet            PLAN  I asked her to follow-up in about 6 months and as needed.  No Follow-up on file.

## 2019-04-16 ENCOUNTER — PREP FOR SURGERY (OUTPATIENT)
Dept: OTHER | Facility: HOSPITAL | Age: 64
End: 2019-04-16

## 2019-04-16 DIAGNOSIS — K63.5 COLON POLYP: Primary | ICD-10-CM

## 2019-04-16 DIAGNOSIS — Z80.0 FH: COLON CANCER: ICD-10-CM

## 2019-04-19 PROBLEM — Z80.0 FH: COLON CANCER: Status: ACTIVE | Noted: 2019-04-19

## 2019-04-19 PROBLEM — K63.5 COLON POLYP: Status: ACTIVE | Noted: 2019-04-19

## 2019-06-07 ENCOUNTER — ANESTHESIA EVENT (OUTPATIENT)
Dept: GASTROENTEROLOGY | Facility: HOSPITAL | Age: 64
End: 2019-06-07

## 2019-06-07 ENCOUNTER — ANESTHESIA (OUTPATIENT)
Dept: GASTROENTEROLOGY | Facility: HOSPITAL | Age: 64
End: 2019-06-07

## 2019-06-07 ENCOUNTER — HOSPITAL ENCOUNTER (OUTPATIENT)
Facility: HOSPITAL | Age: 64
Setting detail: HOSPITAL OUTPATIENT SURGERY
Discharge: HOME | End: 2019-06-07
Attending: INTERNAL MEDICINE | Admitting: INTERNAL MEDICINE

## 2019-06-07 VITALS
WEIGHT: 164 LBS | SYSTOLIC BLOOD PRESSURE: 128 MMHG | OXYGEN SATURATION: 97 % | HEART RATE: 67 BPM | HEIGHT: 62 IN | DIASTOLIC BLOOD PRESSURE: 77 MMHG | TEMPERATURE: 98.7 F | BODY MASS INDEX: 30.18 KG/M2 | RESPIRATION RATE: 16 BRPM

## 2019-06-07 DIAGNOSIS — Z80.0 FH: COLON CANCER: ICD-10-CM

## 2019-06-07 DIAGNOSIS — K63.5 COLON POLYP: ICD-10-CM

## 2019-06-07 PROCEDURE — 88305 TISSUE EXAM BY PATHOLOGIST: CPT | Performed by: INTERNAL MEDICINE

## 2019-06-07 PROCEDURE — 25010000002 PROPOFOL 10 MG/ML EMULSION: Performed by: ANESTHESIOLOGY

## 2019-06-07 PROCEDURE — 45380 COLONOSCOPY AND BIOPSY: CPT | Performed by: INTERNAL MEDICINE

## 2019-06-07 RX ORDER — LIDOCAINE HYDROCHLORIDE 10 MG/ML
0.5 INJECTION, SOLUTION INFILTRATION; PERINEURAL ONCE AS NEEDED
Status: CANCELLED | OUTPATIENT
Start: 2019-06-07

## 2019-06-07 RX ORDER — SODIUM CHLORIDE 0.9 % (FLUSH) 0.9 %
3 SYRINGE (ML) INJECTION AS NEEDED
Status: DISCONTINUED | OUTPATIENT
Start: 2019-06-07 | End: 2019-06-07 | Stop reason: HOSPADM

## 2019-06-07 RX ORDER — LIDOCAINE HYDROCHLORIDE 20 MG/ML
INJECTION, SOLUTION INFILTRATION; PERINEURAL AS NEEDED
Status: DISCONTINUED | OUTPATIENT
Start: 2019-06-07 | End: 2019-06-07

## 2019-06-07 RX ORDER — PROPOFOL 10 MG/ML
VIAL (ML) INTRAVENOUS AS NEEDED
Status: DISCONTINUED | OUTPATIENT
Start: 2019-06-07 | End: 2019-06-07 | Stop reason: SURG

## 2019-06-07 RX ORDER — SODIUM CHLORIDE, SODIUM LACTATE, POTASSIUM CHLORIDE, CALCIUM CHLORIDE 600; 310; 30; 20 MG/100ML; MG/100ML; MG/100ML; MG/100ML
1000 INJECTION, SOLUTION INTRAVENOUS CONTINUOUS
Status: DISCONTINUED | OUTPATIENT
Start: 2019-06-07 | End: 2019-06-07 | Stop reason: HOSPADM

## 2019-06-07 RX ORDER — PROPOFOL 10 MG/ML
VIAL (ML) INTRAVENOUS CONTINUOUS PRN
Status: DISCONTINUED | OUTPATIENT
Start: 2019-06-07 | End: 2019-06-07 | Stop reason: SURG

## 2019-06-07 RX ADMIN — PROPOFOL 160 MCG/KG/MIN: 10 INJECTION, EMULSION INTRAVENOUS at 13:36

## 2019-06-07 RX ADMIN — SODIUM CHLORIDE, POTASSIUM CHLORIDE, SODIUM LACTATE AND CALCIUM CHLORIDE 1000 ML: 600; 310; 30; 20 INJECTION, SOLUTION INTRAVENOUS at 12:34

## 2019-06-07 RX ADMIN — PROPOFOL 20 MG: 10 INJECTION, EMULSION INTRAVENOUS at 13:39

## 2019-06-07 RX ADMIN — PROPOFOL 20 MG: 10 INJECTION, EMULSION INTRAVENOUS at 13:45

## 2019-06-07 RX ADMIN — PROPOFOL 160 MG: 10 INJECTION, EMULSION INTRAVENOUS at 13:33

## 2019-06-07 NOTE — ANESTHESIA POSTPROCEDURE EVALUATION
Patient: Augustina Rodgers    Procedure Summary     Date:  06/07/19 Room / Location:   [facility] /  [facility]    Anesthesia Start:  1328 Anesthesia Stop:  1355    Procedure:  COLONOSCOPY into cecum and  TI with cold biopsy polypectomies (N/A ) Diagnosis:       Colon polyp      FH: colon cancer      (Colon polyp [K63.5])      (FH: colon cancer [Z80.0])    Surgeon:  Xavier Robles MD Provider:  Sanjana Pitt MD    Anesthesia Type:  MAC ASA Status:  2          Anesthesia Type: MAC  Last vitals  BP   128/77 (06/07/19 1417)   Temp   37.1 °C (98.7 °F) (06/07/19 1222)   Pulse   67 (06/07/19 1417)   Resp   16 (06/07/19 1417)     SpO2   97 % (06/07/19 1417)     Post Anesthesia Care and Evaluation    Patient location during evaluation: PACU  Patient participation: complete - patient participated  Level of consciousness: awake and alert  Pain management: adequate  Airway patency: patent  Anesthetic complications: No anesthetic complications  PONV Status: none  Cardiovascular status: acceptable  Respiratory status: acceptable  Hydration status: acceptable

## 2019-06-07 NOTE — ANESTHESIA PREPROCEDURE EVALUATION
Anesthesia Evaluation     Patient summary reviewed and Nursing notes reviewed   no history of anesthetic complications:  NPO Solid Status: > 8 hours  NPO Liquid Status: > 4 hours           Airway   Mallampati: II  TM distance: >3 FB  Neck ROM: full  No difficulty expected  Dental - normal exam     Pulmonary - normal exam    breath sounds clear to auscultation  (-) asthma  Cardiovascular - normal exam    Rhythm: regular  Rate: normal    (+) valvular problems/murmurs MR and TI,       Neuro/Psych  (+) psychiatric history Depression and Anxiety,     GI/Hepatic/Renal/Endo    (+) obesity,       Musculoskeletal (-) negative ROS    Abdominal   (+) obese,    Substance History - negative use     OB/GYN negative ob/gyn ROS         Other - negative ROS                     Anesthesia Plan    ASA 2     MAC     intravenous induction   Anesthetic plan, all risks, benefits, and alternatives have been provided, discussed and informed consent has been obtained with: patient.       Walk in

## 2019-06-07 NOTE — DISCHARGE INSTRUCTIONS
For the next 24 hours patient needs to be with a responsible adult.    For 24 hours DO NOT drive, operate machinery, appliances, drink alcohol, make important decisions or sign legal documents.    Start with a light or bland diet if you are feeling sick to your stomach otherwise advance to regular diet as tolerated.    Follow recommendations on procedure report if provided by your doctor.    Call Dr Robles for problems [phone]     WHAT ARE DIVERTICULOSIS AND DIVERTICULITIS?  Many people have small pouches in their colons that bulge outward through weak spots, like an inner tube that pokes through weak places in a tire.  Each pouch is called a diverticulum.  The condition of having diverticula is DIVERTICULOSIS.  The condition becomes more common as people age.  About half of all people over the age of 60 have diverticulosis    When pouches become infected or inflamed, the condition is called DIVERTICULITIS.  This happens in 10% to 25% of people with diverticulosis.  Diverticulosis and diverticulitis are also called DIVERTICULAR DISEASE.     WHAT ARE THE SYMPTOMS?  Diverticulosis - Most people do not have any discomfort or symptoms.  However, symptoms may include mild cramps, bloating, and constipation.  Other diseases such as irritable bowel syndrome (IBS) and stomach ulcers cause similar problems, so these symptoms do not always mean a person has diverticulosis.  You should visit your doctor if you have these troubling symptoms.    Diverticulitis - The most common symptom is abdominal pain.  The most common sign is tenderness around the left side of the lower abdomen.  If infection is the cause, fever, nausea, vomiting, chills, cramping, and constipation may occur as well.  The severity depends on the extent of the infection and complications.    WHAT ARE THE COMPLICATIONS?  Diverticulitis can lead to bleeding, infections,perforations or tears, or blockages.  These complications always require treatment to  prevent them from proggressing and causing serous illness.    Bleeding from a diverticula is a rare complication.  When this occurs, blood may appear in the toilet or in your stool.  Bleeding can be severe, but it may stop by itself and not require treatment.  Doctors believe bleeding diverticula are caused by a small blood vessel in a diverticulum that weakens and finally bursts.  If you have bleeding from the rectum, you should see your doctor.  If the bleeding does not stop you may need surgery.    Abscess, Perforation, and Peritonitis - The infection causing diverticulitis often clears up after a few days of treatment with antibiotics.  If the condition gets worse, an abscess may form in the colon.  An abscess is an infected area with pus that may cause swelling and destroy tissue.  Sometimes the infected diverticula may develop small holes, called perforations.  These perforations allow pus to leak out of the colon into the abdominal area.  If the abscess is small and remains in the colon, it may clear up after treatment with antibiotics.  If not, the doctor may need to drain it.  A large abscess can become a serious problem if the infection leaks out and contaminates areas outside the colon.  Infection that spreads into the abdominal cavity is called peritonitis.  Peritonitis requires immediate surgery toclean the abdominal cavity and remove the damaged part of the colon.  Without surgery, peritonitis can be fatal.    FISTULA  A fistula is an abnormal connection of tissue between two organs or between an organ and the skin.  When damaged tissues come into contact with each other during infection, they sometimes stick together.  If they heal that way, a fistula forms.  When diverticulitis-related infection spreads through out the colon, the colon's tissue may stick to nearby tissues.  The organs usually involved are the bladder, small intestine, and skin.  The problem can be corrected with surgery to remove the  fistula and affected part of the colon.    INTESTINAL OBSTRUCTION  The scarring caused by infection may cause partial or total blockage of the large intestine.  When this happens, the colon is unable to move bowel contents normally.  When the obstruction totally blocks the intestine, emergency surgery is necessary.  Partial blockage is not an emergency, so the surgery to correct it can be planned.    WHAT CAUSES DIVERTICULAR DISEASE  Although not proven, the dominant theory is that a low-fiber diet is the main cause of diverticular disease.  The disease was first noticed in the [place] in the early 1900s.  At about the same time, processed foods were introduced into the American diet.  Many processed foods contain refined, low-fiber flour.  Unlike whole-wheat flour, refined flour has no wheat bran.    Diverticular disease is common in developed or industrialized countries-particularly the [place], [place], and [place]-where low-fiber diets are common.  The disease is rare in countries of [place] and [place], where people eat high-fiber vegetable diets.    Fiber is the part of fruits, vegetables, and whole grains that the body cannot digest.  Some fiber dissolves easily in water (soluble fiber).  It takes on a soft, jelly-like texture in the intestines.  Some fiber passes almost unchanged through the intestines (insoluble fiber).  Both kinds of fiber help make stools soft and easy to pass.  Fiber also prevents constipation.    Constipation makes the muscles strain to move stool that is too hard.  It is the main cause of increased pressure in the colon.  This excess pressure might cause the weak spots in the colon to bulge out and become diverticula.  Diverticulitis occurs when diverticula become infected or inflamed.  Doctors are not certain what causes the infection.  It may begin when stool or bacteria are caught in the diverticula.  An attack of diverticulitis can develop suddenly and without  warning.    HOW DOES THE DOCTOR DIAGNOSE DIVERTICULAR DISEASE  The doctor asks about medical history, does a physical exam, and may perform one or more diagnostic tests.  Because most people do not have symptoms, diverticulosis is often found through tests ordered for another ailment.    When taking a medical history, the doctor may ask about bowel habits, symptoms, pain, diet, and medications.  The physical exam usually involves a digital rectal exam.  To preform this test. The doctor inserts a gloved, lubricated finger into the rectum to detect tenderness, blockage, or blood.  The doctor may check stool for signs of bleeding and test blood for signs of infection.  The doctor may also order x-rays or other tests.    WHAT IS THE TREATMENT FOR DIVERTICULAR DISEASE  Increasing the amount of fiber in the diet may reduce symptoms of diverticulosis and prevent complications such as diverticulitis.  Fiber keeps stool soft and lowers pressure inside the colon so that bowel contents can move through easily.  The American Dietetic Association. Recommends 20 to 35 grams of fiber each day.  The doctor may also recommend taking a fiber product such as Citrucel or Metamucil once a dya.  These products are mixed water and provide about 2 to 3.5 grams of fiber per  Tablespoon, mixed with 8 ounces of water.    Avoidance of nuts, popcorn, and sunflower, pumpkin, ava, and sesame seeds has been recommended by physicians out of fear that food particles could enter, block, or irritate the diverticula.  However, no scientific data support this treatment measure.  Eating a high-fiber diet is the only requirement highly emphasized across the medical literature.  Eliminating specific foods is not necessary.  The seeds in tomatoes, zucchini, cucumbers, strawberries, and raspberries, as well as poppy seeds, are generally considered harmless.  People differ in amounts and types of foods the can eat.  Decisions about diet should be made  based on what works best for each person.  Keeping a food diary may help identify what foods may cause symptoms.    If cramps, bloating, and constipation are problems, the doctor may prescribe  Short course of pain medication.  However, many medications affect emptying of the colon, an undesirable side effect for people with diverticulosis.    DIVERTICULITIS  Treatment focuses on clearing up the infection and inflammation, resting the colon, and preventing or minimizing complications.  An attack of diverticulitis without complications may respond to antibiotics within a few days if treated early.  To help the colon rest, the doctor may recommend bed rest and a liquid diet, along with a pain reliever.    An acute attack with severe pain or sever infection may require a hospital stay.  Most acute cases of diverticulitis are treated with antibiotics and a liquid diet.  The antibiotics are given by injection into a vein.  In some cases, however, surgery may be necessary.    WHEN IS SURGERY NECESSARY  If attacks are severe or frequent, the doctor may advise surgery.  The surgeon removes the affected part of the colon and joins the remaining sections.  This typed of surgery, called colon resection, aims to keep attacks from coming back and to prevent complications.  The doctor may also recommend surgery for complications of a fistula or intestinal obstruction.    If antibiotics do not correct an attack, emergency surgery may be required.  Other reasons for emergency surgery include a large abscess, perforation, peritonitis, or continued bleeding.    Emergency surgery usually involves 2 operations.  The first will clear the infected abdominal cavity and remove part of the colon.  Because infection and sometimes obstruction, it is not safe to rejoin the colon during the first operation.  Instead, the surgeon creates a temporary hole, or stoma, in the abdomen.  The end of the colon is connected to the hole, a procedure  called a colostomy, to allow normal eating and bowel movements.  The stool goes into a bag attached to the opening in the abdomen.  In the second operation, the surgeon rejoins the ends of the colon.      Problems may include but not limited to: large amounts of bleeding, trouble breathing, repeated vomiting, severe unrelieved pain, fever or chills.

## 2019-06-11 LAB
CYTO UR: NORMAL
LAB AP CASE REPORT: NORMAL
PATH REPORT.FINAL DX SPEC: NORMAL
PATH REPORT.GROSS SPEC: NORMAL

## 2019-06-19 ENCOUNTER — TELEPHONE (OUTPATIENT)
Dept: GASTROENTEROLOGY | Facility: CLINIC | Age: 64
End: 2019-06-19

## 2019-06-19 NOTE — TELEPHONE ENCOUNTER
----- Message from Xavier Robles MD sent at 6/11/2019  5:31 PM EDT -----  Tell her that the colon polyps that were removed during her recent colonoscopy were not cancerous and not precancerous, which is good.  I would recommend that she have a repeat colonoscopy in 3 to 4 years.  Fax a copy of this report to her PCP.. Thx. [email or web address]

## 2019-06-19 NOTE — TELEPHONE ENCOUNTER
Call to spouse, Eddy (see hipaa).  Advise per Dr Robles that colon polyps that were removed during recent c/s were not cancerous and not precancerous, which is good.  Recommend repeat cs in 3-4 yr  Eddy verb understanding.    Message to DR Dmitri Tavares.    C/s for 6/7/22 placed in recall.

## 2019-09-18 DIAGNOSIS — E78.00 ELEVATED CHOLESTEROL: ICD-10-CM

## 2019-09-18 DIAGNOSIS — E78.5 DYSLIPIDEMIA: Primary | ICD-10-CM

## 2019-09-24 ENCOUNTER — RESULTS ENCOUNTER (OUTPATIENT)
Dept: FAMILY MEDICINE CLINIC | Facility: CLINIC | Age: 64
End: 2019-09-24

## 2019-09-24 DIAGNOSIS — E78.5 DYSLIPIDEMIA: ICD-10-CM

## 2019-09-24 DIAGNOSIS — E78.00 ELEVATED CHOLESTEROL: ICD-10-CM

## 2019-09-25 LAB
ALBUMIN SERPL-MCNC: 4.4 G/DL (ref 3.5–5.2)
ALBUMIN/GLOB SERPL: 1.1 G/DL
ALP SERPL-CCNC: 64 U/L (ref 39–117)
ALT SERPL-CCNC: 15 U/L (ref 1–33)
APPEARANCE UR: CLEAR
AST SERPL-CCNC: 18 U/L (ref 1–32)
BACTERIA #/AREA URNS HPF: NORMAL /HPF
BASOPHILS # BLD AUTO: 0.03 10*3/MM3 (ref 0–0.2)
BASOPHILS NFR BLD AUTO: 0.9 % (ref 0–1.5)
BILIRUB SERPL-MCNC: 0.5 MG/DL (ref 0.2–1.2)
BILIRUB UR QL STRIP: NEGATIVE
BUN SERPL-MCNC: 18 MG/DL (ref 8–23)
BUN/CREAT SERPL: 26.5 (ref 7–25)
CALCIUM SERPL-MCNC: 9.4 MG/DL (ref 8.6–10.5)
CASTS URNS MICRO: NORMAL
CHLORIDE SERPL-SCNC: 100 MMOL/L (ref 98–107)
CHOLEST SERPL-MCNC: 186 MG/DL (ref 0–200)
CO2 SERPL-SCNC: 28 MMOL/L (ref 22–29)
COLOR UR: YELLOW
CREAT SERPL-MCNC: 0.68 MG/DL (ref 0.57–1)
EOSINOPHIL # BLD AUTO: 0.11 10*3/MM3 (ref 0–0.4)
EOSINOPHIL NFR BLD AUTO: 3.2 % (ref 0.3–6.2)
EPI CELLS #/AREA URNS HPF: NORMAL /HPF
ERYTHROCYTE [DISTWIDTH] IN BLOOD BY AUTOMATED COUNT: 11.6 % (ref 12.3–15.4)
GLOBULIN SER CALC-MCNC: 3.9 GM/DL
GLUCOSE SERPL-MCNC: 104 MG/DL (ref 65–99)
GLUCOSE UR QL: NEGATIVE
HCT VFR BLD AUTO: 35.3 % (ref 34–46.6)
HDLC SERPL-MCNC: 45 MG/DL (ref 40–60)
HGB BLD-MCNC: 11.5 G/DL (ref 12–15.9)
HGB UR QL STRIP: NEGATIVE
IMM GRANULOCYTES # BLD AUTO: 0.01 10*3/MM3 (ref 0–0.05)
IMM GRANULOCYTES NFR BLD AUTO: 0.3 % (ref 0–0.5)
KETONES UR QL STRIP: NEGATIVE
LDLC SERPL CALC-MCNC: 120 MG/DL (ref 0–100)
LDLC/HDLC SERPL: 2.67 {RATIO}
LEUKOCYTE ESTERASE UR QL STRIP: NEGATIVE
LYMPHOCYTES # BLD AUTO: 0.86 10*3/MM3 (ref 0.7–3.1)
LYMPHOCYTES NFR BLD AUTO: 25.4 % (ref 19.6–45.3)
MCH RBC QN AUTO: 31.9 PG (ref 26.6–33)
MCHC RBC AUTO-ENTMCNC: 32.6 G/DL (ref 31.5–35.7)
MCV RBC AUTO: 98.1 FL (ref 79–97)
MONOCYTES # BLD AUTO: 0.39 10*3/MM3 (ref 0.1–0.9)
MONOCYTES NFR BLD AUTO: 11.5 % (ref 5–12)
NEUTROPHILS # BLD AUTO: 1.99 10*3/MM3 (ref 1.7–7)
NEUTROPHILS NFR BLD AUTO: 58.7 % (ref 42.7–76)
NITRITE UR QL STRIP: NEGATIVE
NRBC BLD AUTO-RTO: 0 /100 WBC (ref 0–0.2)
PH UR STRIP: 6 [PH] (ref 5–8)
PLATELET # BLD AUTO: 209 10*3/MM3 (ref 140–450)
POTASSIUM SERPL-SCNC: 4.6 MMOL/L (ref 3.5–5.2)
PROT SERPL-MCNC: 8.3 G/DL (ref 6–8.5)
PROT UR QL STRIP: NEGATIVE
RBC # BLD AUTO: 3.6 10*6/MM3 (ref 3.77–5.28)
RBC #/AREA URNS HPF: NORMAL /HPF
SODIUM SERPL-SCNC: 139 MMOL/L (ref 136–145)
SP GR UR: 1.02 (ref 1–1.03)
TRIGL SERPL-MCNC: 104 MG/DL (ref 0–150)
UROBILINOGEN UR STRIP-MCNC: (no result) MG/DL
VLDLC SERPL CALC-MCNC: 20.8 MG/DL
WBC # BLD AUTO: 3.39 10*3/MM3 (ref 3.4–10.8)
WBC #/AREA URNS HPF: NORMAL /HPF

## 2019-09-30 ENCOUNTER — OFFICE VISIT (OUTPATIENT)
Dept: FAMILY MEDICINE CLINIC | Facility: CLINIC | Age: 64
End: 2019-09-30

## 2019-09-30 VITALS
TEMPERATURE: 98 F | WEIGHT: 157 LBS | OXYGEN SATURATION: 98 % | RESPIRATION RATE: 16 BRPM | HEART RATE: 72 BPM | HEIGHT: 62 IN | BODY MASS INDEX: 28.89 KG/M2 | SYSTOLIC BLOOD PRESSURE: 130 MMHG | DIASTOLIC BLOOD PRESSURE: 80 MMHG

## 2019-09-30 DIAGNOSIS — E66.3 OVERWEIGHT (BMI 25.0-29.9): ICD-10-CM

## 2019-09-30 DIAGNOSIS — F41.9 ANXIETY: Primary | ICD-10-CM

## 2019-09-30 DIAGNOSIS — R73.9 HYPERGLYCEMIA: ICD-10-CM

## 2019-09-30 PROCEDURE — 99214 OFFICE O/P EST MOD 30 MIN: CPT | Performed by: INTERNAL MEDICINE

## 2019-09-30 NOTE — PROGRESS NOTES
Subjective   Augustina Rodgers is a 64 y.o. female. Patient is here today for   Chief Complaint   Patient presents with   • Anxiety     PT HERE FOR FOLLOW UP LABS          Vitals:    09/30/19 0758   BP: 130/80   Pulse: 72   Resp: 16   Temp: 98 °F (36.7 °C)   SpO2: 98%       Past Medical History:   Diagnosis Date   • Anxiety    • Asthma    • Depression    • Family history of colon cancer 2/8/2017    Father   • Heart murmur    • History of colon polyps     Dr. Robles   • Hyperplastic colon polyp    • Internal hemorrhoids    • Mitral valve regurgitation 04/03/2012    mild to moderate   • Posterior vitreous detachment of right eye 09/2016    Dr. Bev Wilder   • Tricuspid regurgitation 04/03/2012    mild to moderate   • Tubulovillous adenoma of colon       Allergies   Allergen Reactions   • Ji-1 [Lidocaine] Other (See Comments)     Elevates heart rate   • Cephalosporins    • Kefzol [Cefazolin]       Social History     Socioeconomic History   • Marital status:      Spouse name: Not on file   • Number of children: Not on file   • Years of education: Not on file   • Highest education level: Not on file   Tobacco Use   • Smoking status: Never Smoker   • Smokeless tobacco: Never Used   Substance and Sexual Activity   • Alcohol use: Yes     Comment: socially   • Drug use: No   • Sexual activity: Defer        Current Outpatient Medications:   •  ALPRAZolam (XANAX) 0.25 MG tablet, Take 0.25 mg by mouth 2 (Two) Times a Day As Needed for anxiety., Disp: , Rfl:   •  calcium carbonate (TUMS) 500 MG chewable tablet, Chew 1 tablet As Needed for Indigestion or Heartburn., Disp: , Rfl:   •  Multiple Vitamins-Minerals (MULTIVITAMIN ADULT PO), Take  by mouth., Disp: , Rfl:      Objective     Patient has a history of depression and anxiety.  She recently stopped taking her Lexapro 10 mg tablets.  She tells me that she has been feeling well emotionally since then.    He takes alprazolam very appropriately and infrequently.  Her  graph she and her  are doing weight together on weight watchers.         Review of Systems   Constitutional: Negative.    HENT: Negative.    Respiratory: Negative.    Cardiovascular: Negative.    Musculoskeletal: Negative.    Psychiatric/Behavioral: Negative.        Physical Exam   Constitutional: She is oriented to person, place, and time. She appears well-developed and well-nourished.   Pleasant, neatly groomed, BMI 28.   HENT:   Head: Normocephalic and atraumatic.   Cardiovascular: Normal rate, regular rhythm and normal heart sounds.   Pulmonary/Chest: Effort normal and breath sounds normal.   Neurological: She is alert and oriented to person, place, and time.   Psychiatric: She has a normal mood and affect. Her behavior is normal. Thought content normal.   Nursing note and vitals reviewed.        Problem List Items Addressed This Visit        Other    Anxiety - Primary    Overweight (BMI 25.0-29.9)    Hyperglycemia            PLAN  She and I took a look at her labs done last week.  No significant abnormalities.  Her cholesterol looks good.    She is overweight with a BMI of 28.  I encouraged her to pursue her continued success with weight watchers.    She feels her anxiety is well controlled with alprazolam infrequently.  She does not feel that she needs Lexapro any longer.    We will monitor her hyperglycemia for now.    I asked her to follow-up in 4 to 6 months.  Fasting labs prior to that visit should include hemoglobin A 1C, conference of metabolic panel.  No Follow-up on file.

## 2019-10-17 ENCOUNTER — TRANSCRIBE ORDERS (OUTPATIENT)
Dept: ADMINISTRATIVE | Facility: HOSPITAL | Age: 64
End: 2019-10-17

## 2019-10-17 DIAGNOSIS — Z12.39 SCREENING BREAST EXAMINATION: Primary | ICD-10-CM

## 2019-11-15 ENCOUNTER — HOSPITAL ENCOUNTER (OUTPATIENT)
Dept: MAMMOGRAPHY | Facility: HOSPITAL | Age: 64
Discharge: HOME | End: 2019-11-15
Admitting: OBSTETRICS & GYNECOLOGY

## 2019-11-15 ENCOUNTER — TELEPHONE (OUTPATIENT)
Dept: OBSTETRICS AND GYNECOLOGY | Age: 64
End: 2019-11-15

## 2019-11-15 DIAGNOSIS — Z12.39 SCREENING BREAST EXAMINATION: ICD-10-CM

## 2019-11-15 PROCEDURE — 77067 SCR MAMMO BI INCL CAD: CPT

## 2019-11-15 PROCEDURE — 77063 BREAST TOMOSYNTHESIS BI: CPT

## 2019-12-05 ENCOUNTER — OFFICE VISIT (OUTPATIENT)
Dept: OBSTETRICS AND GYNECOLOGY | Age: 64
End: 2019-12-05

## 2019-12-05 VITALS
HEIGHT: 62 IN | WEIGHT: 154.4 LBS | SYSTOLIC BLOOD PRESSURE: 122 MMHG | DIASTOLIC BLOOD PRESSURE: 68 MMHG | BODY MASS INDEX: 28.41 KG/M2

## 2019-12-05 DIAGNOSIS — Z11.51 SPECIAL SCREENING EXAMINATION FOR HUMAN PAPILLOMAVIRUS (HPV): ICD-10-CM

## 2019-12-05 DIAGNOSIS — Z12.4 ROUTINE CERVICAL SMEAR: Primary | ICD-10-CM

## 2019-12-05 DIAGNOSIS — Z00.00 ANNUAL PHYSICAL EXAM: ICD-10-CM

## 2019-12-05 PROCEDURE — 99396 PREV VISIT EST AGE 40-64: CPT | Performed by: OBSTETRICS & GYNECOLOGY

## 2019-12-05 NOTE — PROGRESS NOTES
Subjective   Augustina Rodgers is a 64 y.o. female is being seen today for   Chief Complaint   Patient presents with   • Gynecologic Exam     Pap 2016, MG , DEXA , C-scope    .    History of Present Illness  Patient is here for an annual exam.  Overall she doing well as are her daughters grandchildren etc.  Nothing new in the family bowels and bladder work well.  Complained of been little bit more dry skin dry eyes and although it could be winter and aging keeps up she needs to talk to her PCP.  Repair much I think get everything straightened out with the mammogram as far as which side the old biopsy site was on.  No other complaints  The following portions of the patient's history were reviewed and updated as appropriate: allergies, current medications, past family history, past medical history, past social history, past surgical history and problem list.    Vitals:    19 1022   BP: 122/68       PAST MEDICAL HISTORY  Past Medical History:   Diagnosis Date   • Anxiety    • Asthma    • Depression    • Family history of colon cancer 2017    Father   • Heart murmur    • History of colon polyps     Dr. Robles   • Hyperplastic colon polyp    • Internal hemorrhoids    • Mitral valve regurgitation 2012    mild to moderate   • Posterior vitreous detachment of right eye 2016    Dr. Bev Wilder   • Tricuspid regurgitation 2012    mild to moderate     OB History      Para Term  AB Living    2 2 2          SAB TAB Ectopic Molar Multiple Live Births                       Past Surgical History:   Procedure Laterality Date   • BREAST BIOPSY     • BREAST CYST ASPIRATION     •  SECTION     • COLONOSCOPY  2015    hyperplastic polyp   • COLONOSCOPY  2011    [facility]   • COLONOSCOPY  2003    inflamed tubulovillous polyp   [facility]   • COLONOSCOPY N/A 2017    Procedure: COLONOSCOPY TO CECUM ANT TERMINAL ILEUM WITH COLD BIOPSY POLYPECTOMIES;  Surgeon: Xavier  MD Travis;  Location: [facility] ENDOSCOPY;  Service:    • COLONOSCOPY  06/16/2017    Dr. Robles [facility] 3 polyps   • COLONOSCOPY N/A 6/7/2019    Procedure: COLONOSCOPY into cecum and  TI with cold biopsy polypectomies;  Surgeon: Xavier Robles MD;  Location: [facility] ENDOSCOPY;  Service: Gastroenterology   • FERTILITY SURGERY      laproscopic   • TONSILLECTOMY       Family History   Problem Relation Age of Onset   • Alzheimer's disease Mother    • Hypertension Mother    • COPD Mother    • Colon cancer Father    • Hypertension Father    • Anxiety disorder Father    • Colon polyps Brother    • Breast cancer Paternal Grandmother      Social History     Tobacco Use   Smoking Status Never Smoker   Smokeless Tobacco Never Used       Current Outpatient Medications:   •  ALPRAZolam (XANAX) 0.25 MG tablet, Take 0.25 mg by mouth 2 (Two) Times a Day As Needed for anxiety., Disp: , Rfl:   •  calcium carbonate (TUMS) 500 MG chewable tablet, Chew 1 tablet As Needed for Indigestion or Heartburn., Disp: , Rfl:   •  Multiple Vitamins-Minerals (MULTIVITAMIN ADULT PO), Take  by mouth., Disp: , Rfl:   Immunization History   Administered Date(s) Administered   • Flu Mist 10/04/2016, 10/05/2017   • Flu Vaccine Intradermal Quad 18-64YR 09/20/2019   • Hepatitis A 09/23/2018, 03/30/2019   • Influenza, Unspecified 09/23/2018   • Shingrix 07/26/2019, 09/27/2019   • Tdap 03/09/2009, 09/23/2018       Review of Systems   Constitutional: Negative for chills, fatigue, fever and unexpected weight change.   Respiratory: Negative for shortness of breath and wheezing.    Cardiovascular: Negative for chest pain.   Gastrointestinal: Negative for abdominal distention, abdominal pain, blood in stool, constipation, diarrhea and nausea.   Genitourinary: Negative for difficulty urinating, dyspareunia, dysuria, frequency, hematuria, menstrual problem, pelvic pain, urgency and vaginal discharge.   Skin: Negative for rash.       Objective   Physical Exam   Constitutional:  She is oriented to person, place, and time. Vital signs are normal. She appears well-developed and well-nourished.   Neck: No thyromegaly present.   Cardiovascular: Normal rate, regular rhythm and normal heart sounds.   Pulmonary/Chest: Effort normal. Right breast exhibits no inverted nipple, no mass, no nipple discharge, no skin change and no tenderness. Left breast exhibits no inverted nipple, no mass, no nipple discharge, no skin change and no tenderness. Breasts are symmetrical. There is no breast swelling.   Abdominal: Soft.   Genitourinary: Vagina normal and uterus normal. No breast tenderness, discharge or bleeding. Pelvic exam was performed with patient supine. No labial fusion. There is no rash, tenderness, lesion or injury on the right labia. There is no rash, tenderness, lesion or injury on the left labia. Cervix exhibits no motion tenderness, no discharge and no friability. Right adnexum displays no mass, no tenderness and no fullness. Left adnexum displays no mass, no tenderness and no fullness.   Neurological: She is alert and oriented to person, place, and time.   Psychiatric: She has a normal mood and affect.   Vitals reviewed.        Assessment/Plan   Augustina was seen today for gynecologic exam.    Diagnoses and all orders for this visit:    Routine cervical smear  -     IGP, Aptima HPV, Rfx 16 / 18,45    Special screening examination for human papillomavirus (HPV)  -     IGP, Aptima HPV, Rfx 16 / 18,45    Annual physical exam      Normal exam today.  Pap smear done today.  Mammogram this year was normal.  Have a bone density next year and colonoscopy was also this year with hyperplastic polyps better than tubular adenomas that she had last time.  Talked exercise etc. back in a year

## 2019-12-09 ENCOUNTER — TELEPHONE (OUTPATIENT)
Dept: OBSTETRICS AND GYNECOLOGY | Age: 64
End: 2019-12-09

## 2019-12-09 LAB
CYTOLOGIST CVX/VAG CYTO: NORMAL
CYTOLOGY CVX/VAG DOC CYTO: NORMAL
CYTOLOGY CVX/VAG DOC THIN PREP: NORMAL
DX ICD CODE: NORMAL
HIV 1 & 2 AB SER-IMP: NORMAL
HPV I/H RISK 4 DNA CVX QL PROBE+SIG AMP: NEGATIVE
OTHER STN SPEC: NORMAL
STAT OF ADQ CVX/VAG CYTO-IMP: NORMAL

## 2019-12-09 NOTE — TELEPHONE ENCOUNTER
----- Message from Sean Camacho MD sent at 12/9/2019 10:22 AM EST -----  Tell patient negative Pap

## 2020-03-12 DIAGNOSIS — R73.9 HYPERGLYCEMIA: ICD-10-CM

## 2020-03-12 DIAGNOSIS — E78.00 ELEVATED CHOLESTEROL: Primary | ICD-10-CM

## 2020-03-17 LAB
ALBUMIN SERPL-MCNC: 4.3 G/DL (ref 3.5–5.2)
ALBUMIN/GLOB SERPL: 1.2 G/DL
ALP SERPL-CCNC: 64 U/L (ref 39–117)
ALT SERPL-CCNC: 14 U/L (ref 1–33)
AST SERPL-CCNC: 17 U/L (ref 1–32)
BASOPHILS # BLD AUTO: 0.02 10*3/MM3 (ref 0–0.2)
BASOPHILS NFR BLD AUTO: 0.5 % (ref 0–1.5)
BILIRUB SERPL-MCNC: 0.2 MG/DL (ref 0.2–1.2)
BUN SERPL-MCNC: 16 MG/DL (ref 8–23)
BUN/CREAT SERPL: 18.8 (ref 7–25)
CALCIUM SERPL-MCNC: 9 MG/DL (ref 8.6–10.5)
CHLORIDE SERPL-SCNC: 103 MMOL/L (ref 98–107)
CHOLEST SERPL-MCNC: 183 MG/DL (ref 0–200)
CO2 SERPL-SCNC: 24.4 MMOL/L (ref 22–29)
CREAT SERPL-MCNC: 0.85 MG/DL (ref 0.57–1)
EOSINOPHIL # BLD AUTO: 0.11 10*3/MM3 (ref 0–0.4)
EOSINOPHIL NFR BLD AUTO: 2.9 % (ref 0.3–6.2)
ERYTHROCYTE [DISTWIDTH] IN BLOOD BY AUTOMATED COUNT: 11.8 % (ref 12.3–15.4)
GLOBULIN SER CALC-MCNC: 3.7 GM/DL
GLUCOSE SERPL-MCNC: 107 MG/DL (ref 65–99)
HBA1C MFR BLD: 6.6 % (ref 4.8–5.6)
HCT VFR BLD AUTO: 34.6 % (ref 34–46.6)
HDLC SERPL-MCNC: 43 MG/DL (ref 40–60)
HGB BLD-MCNC: 11.8 G/DL (ref 12–15.9)
IMM GRANULOCYTES # BLD AUTO: 0.01 10*3/MM3 (ref 0–0.05)
IMM GRANULOCYTES NFR BLD AUTO: 0.3 % (ref 0–0.5)
LDLC SERPL CALC-MCNC: 120 MG/DL (ref 0–100)
LDLC/HDLC SERPL: 2.8 {RATIO}
LYMPHOCYTES # BLD AUTO: 1.06 10*3/MM3 (ref 0.7–3.1)
LYMPHOCYTES NFR BLD AUTO: 27.7 % (ref 19.6–45.3)
MCH RBC QN AUTO: 32.8 PG (ref 26.6–33)
MCHC RBC AUTO-ENTMCNC: 34.1 G/DL (ref 31.5–35.7)
MCV RBC AUTO: 96.1 FL (ref 79–97)
MONOCYTES # BLD AUTO: 0.43 10*3/MM3 (ref 0.1–0.9)
MONOCYTES NFR BLD AUTO: 11.2 % (ref 5–12)
NEUTROPHILS # BLD AUTO: 2.2 10*3/MM3 (ref 1.7–7)
NEUTROPHILS NFR BLD AUTO: 57.4 % (ref 42.7–76)
NRBC BLD AUTO-RTO: 0 /100 WBC (ref 0–0.2)
PLATELET # BLD AUTO: 204 10*3/MM3 (ref 140–450)
POTASSIUM SERPL-SCNC: 4 MMOL/L (ref 3.5–5.2)
PROT SERPL-MCNC: 8 G/DL (ref 6–8.5)
RBC # BLD AUTO: 3.6 10*6/MM3 (ref 3.77–5.28)
SODIUM SERPL-SCNC: 139 MMOL/L (ref 136–145)
TRIGL SERPL-MCNC: 98 MG/DL (ref 0–150)
VLDLC SERPL CALC-MCNC: 19.6 MG/DL
WBC # BLD AUTO: 3.83 10*3/MM3 (ref 3.4–10.8)

## 2020-05-06 ENCOUNTER — OFFICE VISIT (OUTPATIENT)
Dept: FAMILY MEDICINE CLINIC | Facility: CLINIC | Age: 65
End: 2020-05-06

## 2020-05-06 VITALS
DIASTOLIC BLOOD PRESSURE: 80 MMHG | BODY MASS INDEX: 28.34 KG/M2 | WEIGHT: 154 LBS | SYSTOLIC BLOOD PRESSURE: 122 MMHG | HEIGHT: 62 IN | RESPIRATION RATE: 16 BRPM | TEMPERATURE: 98.6 F | OXYGEN SATURATION: 98 % | HEART RATE: 66 BPM

## 2020-05-06 DIAGNOSIS — R73.9 HYPERGLYCEMIA: Primary | ICD-10-CM

## 2020-05-06 DIAGNOSIS — R68.2 DRY MOUTH: ICD-10-CM

## 2020-05-06 LAB
EXPIRATION DATE: NORMAL
HBA1C MFR BLD: 5.5 %
Lab: 574

## 2020-05-06 PROCEDURE — 99214 OFFICE O/P EST MOD 30 MIN: CPT | Performed by: INTERNAL MEDICINE

## 2020-05-06 PROCEDURE — 36416 COLLJ CAPILLARY BLOOD SPEC: CPT | Performed by: INTERNAL MEDICINE

## 2020-05-06 PROCEDURE — 83036 HEMOGLOBIN GLYCOSYLATED A1C: CPT | Performed by: INTERNAL MEDICINE

## 2020-05-06 NOTE — PROGRESS NOTES
Subjective   Augustina Rodgers is a 65 y.o. female. Patient is here today for   Chief Complaint   Patient presents with   • Hypertension     had labs          Vitals:    05/06/20 0857   BP: 122/80   Pulse: 66   Resp: 16   Temp: 98.6 °F (37 °C)   SpO2: 98%     Body mass index is 28.17 kg/m².      Past Medical History:   Diagnosis Date   • Anxiety    • Asthma    • Depression    • Family history of colon cancer 2/8/2017    Father   • Heart murmur    • History of colon polyps     Dr. Robles   • Hyperplastic colon polyp    • Internal hemorrhoids    • Mitral valve regurgitation 04/03/2012    mild to moderate   • Posterior vitreous detachment of right eye 09/2016    Dr. Bev Wilder   • Tricuspid regurgitation 04/03/2012    mild to moderate      Allergies   Allergen Reactions   • Ji-1 [Lidocaine] Other (See Comments)     Elevates heart rate   • Cephalosporins    • Kefzol [Cefazolin]       Social History     Socioeconomic History   • Marital status:      Spouse name: Not on file   • Number of children: Not on file   • Years of education: Not on file   • Highest education level: Not on file   Tobacco Use   • Smoking status: Never Smoker   • Smokeless tobacco: Never Used   Substance and Sexual Activity   • Alcohol use: Yes     Comment: socially   • Drug use: No   • Sexual activity: Defer        Current Outpatient Medications:   •  ALPRAZolam (XANAX) 0.25 MG tablet, Take 0.25 mg by mouth 2 (Two) Times a Day As Needed for anxiety., Disp: , Rfl:   •  calcium carbonate (TUMS) 500 MG chewable tablet, Chew 1 tablet As Needed for Indigestion or Heartburn., Disp: , Rfl:   •  Multiple Vitamins-Minerals (MULTIVITAMIN ADULT PO), Take  by mouth., Disp: , Rfl:      Objective     This pleasant lady is here to follow-up on hypertension and some labs she had done well in March.    She tells me that she feels well.  She does have a dry mouth however.  A physician that she sees told her that she may have Sjogren's.    She has no other  symptoms.  She does not have dry eyes, etc.       Review of Systems   Constitutional: Negative.    HENT:        She has a dry mouth.   Respiratory: Negative.    Cardiovascular: Negative.    Musculoskeletal: Negative.    Psychiatric/Behavioral: Negative.        Physical Exam   Constitutional: She is oriented to person, place, and time. She appears well-developed and well-nourished.   Pleasant, neatly groomed, wearing a mask today as was I.   HENT:   Head: Normocephalic.   Neck:   No carotid bruits.   Cardiovascular: Normal rate, regular rhythm and normal heart sounds.   Pulmonary/Chest: Effort normal.   Neurological: She is alert and oriented to person, place, and time.   Psychiatric: She has a normal mood and affect. Her behavior is normal. Thought content normal.   Nursing note and vitals reviewed.        Problem List Items Addressed This Visit        Other    Hyperglycemia - Primary    Relevant Orders    POC Glycated Hemoglobin, Total (Completed)    Dry mouth    Relevant Orders    Sjogren's Antibody, Anti-SS-A / -SS-B            PLAN  We checked a hemoglobin A1c today because her fasting blood sugars have been elevated the last 7 times or so they have been checked.  Her hemoglobin A1c was normal today.    I am going to check Sjogren's antibodies anti-SS a and SSB.    I asked her to follow-up in about 6 months.  She should follow-up for a Medicare wellness visit once yearly.  No follow-ups on file.

## 2020-05-07 LAB
ENA SS-A AB SER-ACNC: >8 AI (ref 0–0.9)
ENA SS-B AB SER-ACNC: >8 AI (ref 0–0.9)

## 2020-05-08 ENCOUNTER — CLINICAL SUPPORT (OUTPATIENT)
Dept: FAMILY MEDICINE CLINIC | Facility: CLINIC | Age: 65
End: 2020-05-08

## 2020-05-08 DIAGNOSIS — Z23 NEED FOR VACCINATION: Primary | ICD-10-CM

## 2020-05-08 PROCEDURE — 90732 PPSV23 VACC 2 YRS+ SUBQ/IM: CPT | Performed by: INTERNAL MEDICINE

## 2020-05-08 PROCEDURE — G0009 ADMIN PNEUMOCOCCAL VACCINE: HCPCS | Performed by: INTERNAL MEDICINE

## 2020-07-13 ENCOUNTER — TELEPHONE (OUTPATIENT)
Dept: FAMILY MEDICINE CLINIC | Facility: CLINIC | Age: 65
End: 2020-07-13

## 2020-07-13 NOTE — TELEPHONE ENCOUNTER
PATIENT CALLED AND INQUIRING ABOUT REFERRAL FOR RHEUMATOLOGY. DR. ADAMS WAS TO SEND TO DR. EDWARD SMITH.   SHE HAS NOT HEARD ANYTHING AND THIS HAS BEEN OVER TWO MONTHS. SHE IS FOLLOWING UP.   PLEASE CALL [phone]

## 2020-07-16 NOTE — TELEPHONE ENCOUNTER
Patient calling back to check the status of the referral to Dr Ocampo.    Please call patient back at [phone].

## 2020-07-20 DIAGNOSIS — M35.00 SJOGREN'S SYNDROME, WITH UNSPECIFIED ORGAN INVOLVEMENT (HCC): Primary | ICD-10-CM

## 2020-07-20 NOTE — TELEPHONE ENCOUNTER
Patient calling back to check on the status of this referral. Patient stated that she thought this was supposed to be placed at her last appointment with Dr. Tavares. Patient was made aware that this has been passed along to Dr. Tavares.    Please call and advise.  [phone]

## 2020-11-09 ENCOUNTER — OFFICE VISIT (OUTPATIENT)
Dept: FAMILY MEDICINE CLINIC | Facility: CLINIC | Age: 65
End: 2020-11-09

## 2020-11-09 VITALS
TEMPERATURE: 97.1 F | OXYGEN SATURATION: 99 % | SYSTOLIC BLOOD PRESSURE: 122 MMHG | WEIGHT: 162 LBS | DIASTOLIC BLOOD PRESSURE: 80 MMHG | HEIGHT: 62 IN | HEART RATE: 74 BPM | RESPIRATION RATE: 18 BRPM | BODY MASS INDEX: 29.81 KG/M2

## 2020-11-09 DIAGNOSIS — Z13.220 SCREENING FOR LIPID DISORDERS: ICD-10-CM

## 2020-11-09 DIAGNOSIS — Z00.00 MEDICARE ANNUAL WELLNESS VISIT, SUBSEQUENT: ICD-10-CM

## 2020-11-09 DIAGNOSIS — Z11.59 ENCOUNTER FOR HEPATITIS C SCREENING TEST FOR LOW RISK PATIENT: ICD-10-CM

## 2020-11-09 DIAGNOSIS — R73.9 HYPERGLYCEMIA: Primary | ICD-10-CM

## 2020-11-09 PROCEDURE — G0439 PPPS, SUBSEQ VISIT: HCPCS | Performed by: INTERNAL MEDICINE

## 2020-11-09 PROCEDURE — 96160 PT-FOCUSED HLTH RISK ASSMT: CPT | Performed by: INTERNAL MEDICINE

## 2020-11-09 NOTE — PROGRESS NOTES
The ABCs of the Annual Wellness Visit  Subsequent Medicare Wellness Visit    Chief Complaint   Patient presents with   • Medicare Wellness-subsequent       Subjective   History of Present Illness:  Augustina Rodgers is a 65 y.o. female who presents for a Subsequent Medicare Wellness Visit.    HEALTH RISK ASSESSMENT    Recent Hospitalizations:  No hospitalization(s) within the last year.    Current Medical Providers:  Patient Care Team:  Dmitri Tavares MD as PCP - General (Internal Medicine)  Sean Camacho MD (Inactive) as Consulting Physician (Obstetrics and Gynecology)  Xavier Robles MD as Consulting Physician (Gastroenterology)  Bev Wilder MD as Consulting Physician (Ophthalmology)    Smoking Status:  Social History     Tobacco Use   Smoking Status Never Smoker   Smokeless Tobacco Never Used       Alcohol Consumption:  Social History     Substance and Sexual Activity   Alcohol Use Yes    Comment: socially       Depression Screen:   PHQ-2/PHQ-9 Depression Screening 11/9/2020   Little interest or pleasure in doing things 1   Feeling down, depressed, or hopeless 1   Trouble falling or staying asleep, or sleeping too much -   Feeling tired or having little energy -   Poor appetite or overeating -   Feeling bad about yourself - or that you are a failure or have let yourself or your family down -   Trouble concentrating on things, such as reading the newspaper or watching television -   Moving or speaking so slowly that other people could have noticed. Or the opposite - being so fidgety or restless that you have been moving around a lot more than usual -   Thoughts that you would be better off dead, or of hurting yourself in some way -   Total Score 2   If you checked off any problems, how difficult have these problems made it for you to do your work, take care of things at home, or get along with other people? -       Fall Risk Screen:  STEADI Fall Risk Assessment was completed, and patient is at LOW  risk for falls.Assessment completed on:5/6/2020    Health Habits and Functional and Cognitive Screening:  Functional & Cognitive Status 11/9/2020   Do you have difficulty preparing food and eating? No   Do you have difficulty bathing yourself, getting dressed or grooming yourself? No   Do you have difficulty using the toilet? No   Do you have difficulty moving around from place to place? No   Do you have trouble with steps or getting out of a bed or a chair? No   Current Diet Well Balanced Diet   Dental Exam Up to date   Eye Exam Up to date   Exercise (times per week) 7 times per week   Current Exercises Include Walking   Do you need help using the phone?  No   Are you deaf or do you have serious difficulty hearing?  No   Do you need help with transportation? No   Do you need help shopping? No   Do you need help preparing meals?  No   Do you need help with housework?  No   Do you need help with laundry? No   Do you need help taking your medications? No   Do you need help managing money? No   Do you ever drive or ride in a car without wearing a seat belt? No   Have you felt unusual stress, anger or loneliness in the last month? No   Who do you live with? Spouse   If you need help, do you have trouble finding someone available to you? No   Have you been bothered in the last four weeks by sexual problems? No   Do you have difficulty concentrating, remembering or making decisions? No         Does the patient have evidence of cognitive impairment? No    Asprin use counseling:Does not need ASA (and currently is not on it)    Age-appropriate Screening Schedule:  Refer to the list below for future screening recommendations based on patient's age, sex and/or medical conditions. Orders for these recommended tests are listed in the plan section. The patient has been provided with a written plan.    Health Maintenance   Topic Date Due   • PAP SMEAR  04/06/2016   • ZOSTER VACCINE (3 of 3) 11/22/2019   • LIPID PANEL  11/09/2021  "  • MAMMOGRAM  11/15/2021   • COLONOSCOPY  06/07/2022   • TDAP/TD VACCINES (3 - Td) 09/23/2028   • INFLUENZA VACCINE  Completed          The following portions of the patient's history were reviewed and updated as appropriate: problem list.    Outpatient Medications Prior to Visit   Medication Sig Dispense Refill   • ALPRAZolam (XANAX) 0.25 MG tablet Take 0.25 mg by mouth 2 (Two) Times a Day As Needed for anxiety.     • calcium carbonate (TUMS) 500 MG chewable tablet Chew 1 tablet As Needed for Indigestion or Heartburn.     • Multiple Vitamins-Minerals (MULTIVITAMIN ADULT PO) Take  by mouth.     • VITAMIN D PO Take  by mouth Daily.       No facility-administered medications prior to visit.        Patient Active Problem List   Diagnosis   • History of colon polyps   • Family history of colon cancer   • Anxiety   • Major depressive disorder with single episode, in full remission (CMS/HCC)   • Colon polyp   • FH: colon cancer   • Overweight (BMI 25.0-29.9)   • Hyperglycemia   • Dry mouth   • Screening for lipid disorders   • Medicare annual wellness visit, subsequent       Advanced Care Planning:  ACP discussion was held with the patient during this visit. Patient has an advance directive in EMR which is still valid.     Review of Systems   Constitutional: Negative.    HENT: Negative.    Respiratory: Negative.    Cardiovascular: Negative.    Musculoskeletal: Negative.    Psychiatric/Behavioral: Negative.        Compared to one year ago, the patient feels her physical health is the same.  Compared to one year ago, the patient feels her mental health is the same.    Reviewed chart for potential of high risk medication in the elderly: yes  Reviewed chart for potential of harmful drug interactions in the elderly:yes    Objective         Vitals:    11/09/20 0847   BP: 122/80   Pulse: 74   Resp: 18   Temp: 97.1 °F (36.2 °C)   SpO2: 99%   Weight: 73.5 kg (162 lb)   Height: 157.5 cm (62.01\")       Body mass index is 29.62 " kg/m².  Discussed the patient's BMI with her. The BMI is in the acceptable range.    Physical Exam  Vitals signs and nursing note reviewed.   Constitutional:       Comments: Pleasant, neatly groomed, in no distress.         Lab Results   Component Value Date    GLU 96 11/09/2020    CHLPL 196 11/09/2020    TRIG 100 11/09/2020    HDL 49 11/09/2020     (H) 11/09/2020    VLDL 18 11/09/2020    HGBA1C 5.40 11/09/2020        Assessment/Plan   Medicare Risks and Personalized Health Plan  CMS Preventative Services Quick Reference  Abdominal Aortic Aneurysm Screening  Advance Directive Discussion  Breast Cancer/Mammogram Screening  Cardiovascular risk  Colon Cancer Screening  Dementia/Memory   Depression/Dysphoria  Diabetic Lab Screening   Fall Risk  Glaucoma Risk  Hearing Problem  Lung Cancer Risk  Osteoprorosis Risk    The above risks/problems have been discussed with the patient.  Pertinent information has been shared with the patient in the After Visit Summary.  Follow up plans and orders are seen below in the Assessment/Plan Section.    Diagnoses and all orders for this visit:    1. Hyperglycemia (Primary)  -     Comprehensive Metabolic Panel  -     Lipid Panel With LDL / HDL Ratio  -     Hemoglobin A1c  -     CBC & Differential    2. Encounter for hepatitis C screening test for low risk patient  -     Hepatitis C antibody; Future    3. Screening for lipid disorders    4. Medicare annual wellness visit, subsequent    Other orders  -     One Specimen Identifier      Follow Up:  No follow-ups on file.     An After Visit Summary and PPPS were given to the patient.       This patient and I reviewed her chart.  She has a history of at least on one occasion an elevated lipid profile, and an elevated fasting glucose.  I will screen her for lipid disorders today, will do a hepatitis C screen, and some other labs.    I would like to see her back in about 6 months and as needed.

## 2020-11-10 LAB
ALBUMIN SERPL-MCNC: 4.4 G/DL (ref 3.5–5.2)
ALBUMIN/GLOB SERPL: 1.3 G/DL
ALP SERPL-CCNC: 63 U/L (ref 39–117)
ALT SERPL-CCNC: 17 U/L (ref 1–33)
AST SERPL-CCNC: 23 U/L (ref 1–32)
BASOPHILS # BLD AUTO: 0.02 10*3/MM3 (ref 0–0.2)
BASOPHILS NFR BLD AUTO: 0.5 % (ref 0–1.5)
BILIRUB SERPL-MCNC: 0.3 MG/DL (ref 0–1.2)
BUN SERPL-MCNC: 18 MG/DL (ref 8–23)
BUN/CREAT SERPL: 22.8 (ref 7–25)
CALCIUM SERPL-MCNC: 9.3 MG/DL (ref 8.6–10.5)
CHLORIDE SERPL-SCNC: 104 MMOL/L (ref 98–107)
CHOLEST SERPL-MCNC: 196 MG/DL (ref 0–200)
CO2 SERPL-SCNC: 28.5 MMOL/L (ref 22–29)
CREAT SERPL-MCNC: 0.79 MG/DL (ref 0.57–1)
EOSINOPHIL # BLD AUTO: 0.14 10*3/MM3 (ref 0–0.4)
EOSINOPHIL NFR BLD AUTO: 3.5 % (ref 0.3–6.2)
ERYTHROCYTE [DISTWIDTH] IN BLOOD BY AUTOMATED COUNT: 11.7 % (ref 12.3–15.4)
GLOBULIN SER CALC-MCNC: 3.5 GM/DL
GLUCOSE SERPL-MCNC: 96 MG/DL (ref 65–99)
HBA1C MFR BLD: 5.4 % (ref 4.8–5.6)
HCT VFR BLD AUTO: 33.9 % (ref 34–46.6)
HDLC SERPL-MCNC: 49 MG/DL (ref 40–60)
HGB BLD-MCNC: 11.6 G/DL (ref 12–15.9)
IMM GRANULOCYTES # BLD AUTO: 0.01 10*3/MM3 (ref 0–0.05)
IMM GRANULOCYTES NFR BLD AUTO: 0.3 % (ref 0–0.5)
LDLC SERPL CALC-MCNC: 129 MG/DL (ref 0–100)
LDLC/HDLC SERPL: 2.59 {RATIO}
LYMPHOCYTES # BLD AUTO: 1.15 10*3/MM3 (ref 0.7–3.1)
LYMPHOCYTES NFR BLD AUTO: 28.9 % (ref 19.6–45.3)
MCH RBC QN AUTO: 33 PG (ref 26.6–33)
MCHC RBC AUTO-ENTMCNC: 34.2 G/DL (ref 31.5–35.7)
MCV RBC AUTO: 96.3 FL (ref 79–97)
MONOCYTES # BLD AUTO: 0.42 10*3/MM3 (ref 0.1–0.9)
MONOCYTES NFR BLD AUTO: 10.6 % (ref 5–12)
NEUTROPHILS # BLD AUTO: 2.24 10*3/MM3 (ref 1.7–7)
NEUTROPHILS NFR BLD AUTO: 56.2 % (ref 42.7–76)
NRBC BLD AUTO-RTO: 0 /100 WBC (ref 0–0.2)
ONE SPECIMEN IDENTIFIER: NORMAL
PLATELET # BLD AUTO: 185 10*3/MM3 (ref 140–450)
POTASSIUM SERPL-SCNC: 4.6 MMOL/L (ref 3.5–5.2)
PROT SERPL-MCNC: 7.9 G/DL (ref 6–8.5)
RBC # BLD AUTO: 3.52 10*6/MM3 (ref 3.77–5.28)
SODIUM SERPL-SCNC: 138 MMOL/L (ref 136–145)
TRIGL SERPL-MCNC: 100 MG/DL (ref 0–150)
VLDLC SERPL CALC-MCNC: 18 MG/DL (ref 5–40)
WBC # BLD AUTO: 3.98 10*3/MM3 (ref 3.4–10.8)

## 2020-11-14 ENCOUNTER — RESULTS ENCOUNTER (OUTPATIENT)
Dept: FAMILY MEDICINE CLINIC | Facility: CLINIC | Age: 65
End: 2020-11-14

## 2020-11-14 DIAGNOSIS — Z11.59 ENCOUNTER FOR HEPATITIS C SCREENING TEST FOR LOW RISK PATIENT: ICD-10-CM

## 2020-11-20 PROBLEM — Z00.00 MEDICARE ANNUAL WELLNESS VISIT, SUBSEQUENT: Status: ACTIVE | Noted: 2020-11-20

## 2020-11-20 PROBLEM — Z13.220 SCREENING FOR LIPID DISORDERS: Status: ACTIVE | Noted: 2020-11-20

## 2020-12-22 ENCOUNTER — OFFICE VISIT (OUTPATIENT)
Dept: OBSTETRICS AND GYNECOLOGY | Age: 65
End: 2020-12-22

## 2020-12-22 VITALS
BODY MASS INDEX: 29.44 KG/M2 | DIASTOLIC BLOOD PRESSURE: 78 MMHG | WEIGHT: 160 LBS | SYSTOLIC BLOOD PRESSURE: 116 MMHG | HEIGHT: 62 IN

## 2020-12-22 DIAGNOSIS — Z01.419 ENCOUNTER FOR GYNECOLOGICAL EXAMINATION WITHOUT ABNORMAL FINDING: Primary | ICD-10-CM

## 2020-12-22 PROCEDURE — G0101 CA SCREEN;PELVIC/BREAST EXAM: HCPCS | Performed by: OBSTETRICS & GYNECOLOGY

## 2020-12-22 NOTE — PROGRESS NOTES
Subjective     Chief Complaint   Patient presents with   • Gynecologic Exam     AC. Last pap 12-19 normal.       History of Present Illness    Augustina Rodgers is a 65 y.o.  who presents for annual exam.  The patient was previously a patient of Dr. Oviedo.  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  She has 2 daughters.  Patient has grandchildren and is doing well overall.  Her  unfortunately had heart disease and they are not sexually active.  Patient went through menopause at age 51.  She is had no vaginal bleeding or pelvic pain.  She had a normal Pap smear last year.  She does have a history of depression but is not taking any medication other than occasional Xanax for anxiety.      Obstetric History:  OB History        2    Para   2    Term   2            AB        Living           SAB        TAB        Ectopic        Molar        Multiple        Live Births                   Menstrual History:     No LMP recorded (lmp unknown). Patient is postmenopausal.         Current contraception: post menopausal status  History of abnormal Pap smear: no  Received Gardasil immunization: no  Perform regular self breast exam : yes  Family history of uterine or ovarian cancer: no  Family History of colon cancer: yes - Dad 57  Family history of breast cancer: yes - PGM 80     Mammo - pt to schedule   Colonoscopy: up to date. 2019 D Travis q3   DEXA: ordered.    Exercise: walks 3 miles a day   Calcium/Vitamin D: adequate intake and uses supplements    The following portions of the patient's history were reviewed and updated as appropriate: allergies, current medications, past family history, past medical history, past social history, past surgical history and problem list.    Review of Systems    Review of Systems   Constitutional: Negative for fatigue.   Respiratory: Negative for shortness of breath.    Gastrointestinal: Negative for abdominal pain.   Genitourinary:  "Negative for dysuria.   Neurological: Negative for headaches.   Psychiatric/Behavioral: Negative for dysphoric mood.     Objective   Physical Exam    /78   Ht 157.5 cm (62\")   Wt 72.6 kg (160 lb)   LMP  (LMP Unknown)   Breastfeeding No   BMI 29.26 kg/m²     General:   alert, appears stated age and cooperative   Neck: thyroid normal to palpation   Heart: regular rate and rhythm   Lungs: clear to auscultation bilaterally   Abdomen: soft, non-tender, without masses or organomegaly   Breast: inspection negative, no nipple discharge or bleeding, no masses or nodularity palpable   Vulva:  Labia majora and minora are without lesions.  Urethra meatus normal without lesions.  Bladder no fullness or mass   Vagina:  Small and atrophic with no lesions seen.  No abnormal discharge.   Cervix: no cervical motion tenderness and no lesions   Uterus: non-tender, normal shape and consistency   Adnexa: no mass, fullness, tenderness   Rectal: normal rectal, no masses.  No anal lesions     Assessment/Plan   Diagnoses and all orders for this visit:    1. Encounter for gynecological examination without abnormal finding (Primary)    The patient needs a bone density which will be ordered.  She is taking adequate calcium and vitamin D  Encouraged yoga.      All questions answered.  Breast self exam technique reviewed and patient encouraged to perform self-exam monthly.  Discussed healthy lifestyle modifications.  Recommended 30 minutes of aerobic exercise five times per week.  Discussed calcium needs to prevent osteoporosis.                     "

## 2021-01-13 ENCOUNTER — PROCEDURE VISIT (OUTPATIENT)
Dept: OBSTETRICS AND GYNECOLOGY | Age: 66
End: 2021-01-13

## 2021-01-13 DIAGNOSIS — Z78.0 POST-MENOPAUSAL: Primary | ICD-10-CM

## 2021-01-13 PROCEDURE — 77080 DXA BONE DENSITY AXIAL: CPT | Performed by: OBSTETRICS & GYNECOLOGY

## 2021-03-04 ENCOUNTER — IMMUNIZATION (OUTPATIENT)
Dept: VACCINE CLINIC | Facility: HOSPITAL | Age: 66
End: 2021-03-04

## 2021-03-04 PROCEDURE — 91300 HC SARSCOV02 VAC 30MCG/0.3ML IM: CPT | Performed by: INTERNAL MEDICINE

## 2021-03-04 PROCEDURE — 0001A: CPT | Performed by: INTERNAL MEDICINE

## 2021-03-25 ENCOUNTER — IMMUNIZATION (OUTPATIENT)
Dept: VACCINE CLINIC | Facility: HOSPITAL | Age: 66
End: 2021-03-25

## 2021-03-25 PROCEDURE — 91300 HC SARSCOV02 VAC 30MCG/0.3ML IM: CPT | Performed by: INTERNAL MEDICINE

## 2021-03-25 PROCEDURE — 0002A: CPT | Performed by: INTERNAL MEDICINE

## 2021-05-03 ENCOUNTER — TRANSCRIBE ORDERS (OUTPATIENT)
Dept: ADMINISTRATIVE | Facility: HOSPITAL | Age: 66
End: 2021-05-03

## 2021-05-03 DIAGNOSIS — Z12.31 BREAST CANCER SCREENING BY MAMMOGRAM: Primary | ICD-10-CM

## 2021-05-04 ENCOUNTER — HOSPITAL ENCOUNTER (OUTPATIENT)
Dept: MAMMOGRAPHY | Facility: HOSPITAL | Age: 66
Discharge: HOME | End: 2021-05-04
Admitting: OBSTETRICS & GYNECOLOGY

## 2021-05-04 DIAGNOSIS — R92.8 ABNORMAL MAMMOGRAM: Primary | ICD-10-CM

## 2021-05-04 DIAGNOSIS — Z12.31 BREAST CANCER SCREENING BY MAMMOGRAM: ICD-10-CM

## 2021-05-04 PROCEDURE — 77063 BREAST TOMOSYNTHESIS BI: CPT

## 2021-05-04 PROCEDURE — 77067 SCR MAMMO BI INCL CAD: CPT

## 2021-05-07 ENCOUNTER — HOSPITAL ENCOUNTER (OUTPATIENT)
Dept: ULTRASOUND IMAGING | Facility: HOSPITAL | Age: 66
End: 2021-05-07

## 2021-05-07 ENCOUNTER — TELEPHONE (OUTPATIENT)
Dept: OBSTETRICS AND GYNECOLOGY | Age: 66
End: 2021-05-07

## 2021-05-07 ENCOUNTER — HOSPITAL ENCOUNTER (OUTPATIENT)
Dept: MAMMOGRAPHY | Facility: HOSPITAL | Age: 66
Discharge: HOME | End: 2021-05-07
Admitting: OBSTETRICS & GYNECOLOGY

## 2021-05-07 DIAGNOSIS — R92.8 ABNORMAL MAMMOGRAM: ICD-10-CM

## 2021-05-07 PROCEDURE — G0279 TOMOSYNTHESIS, MAMMO: HCPCS

## 2021-05-07 PROCEDURE — 77065 DX MAMMO INCL CAD UNI: CPT

## 2021-05-07 NOTE — TELEPHONE ENCOUNTER
----- Message from Stefan Jimenez MD sent at 5/4/2021  1:18 PM EDT -----  I left a message with the patient that there is a focal asymmetry in the right breast.  Patient needs spot compression and ultrasound.  I will place orders.  I asked her to please call back to make sure that she got this message.

## 2021-05-07 NOTE — TELEPHONE ENCOUNTER
----- Message from Stefan Jimenez MD sent at 5/7/2021 12:08 PM EDT -----  Please notify the follow-up mammogram of the right breast is normal.

## 2021-05-18 ENCOUNTER — APPOINTMENT (OUTPATIENT)
Dept: MAMMOGRAPHY | Facility: HOSPITAL | Age: 66
End: 2021-05-18

## 2021-05-18 ENCOUNTER — APPOINTMENT (OUTPATIENT)
Dept: ULTRASOUND IMAGING | Facility: HOSPITAL | Age: 66
End: 2021-05-18

## 2021-06-02 DIAGNOSIS — E78.00 ELEVATED CHOLESTEROL: ICD-10-CM

## 2021-06-02 DIAGNOSIS — R73.9 HYPERGLYCEMIA: Primary | ICD-10-CM

## 2021-06-04 LAB
ALBUMIN SERPL-MCNC: 4.6 G/DL (ref 3.5–5.2)
ALBUMIN/GLOB SERPL: 1.3 G/DL
ALP SERPL-CCNC: 65 U/L (ref 39–117)
ALT SERPL-CCNC: 14 U/L (ref 1–33)
APPEARANCE UR: CLEAR
AST SERPL-CCNC: 19 U/L (ref 1–32)
BACTERIA #/AREA URNS HPF: NORMAL /HPF
BASOPHILS # BLD AUTO: 0.03 10*3/MM3 (ref 0–0.2)
BASOPHILS NFR BLD AUTO: 0.7 % (ref 0–1.5)
BILIRUB SERPL-MCNC: 0.3 MG/DL (ref 0–1.2)
BILIRUB UR QL STRIP: NEGATIVE
BUN SERPL-MCNC: 21 MG/DL (ref 8–23)
BUN/CREAT SERPL: 24.4 (ref 7–25)
CALCIUM SERPL-MCNC: 9.5 MG/DL (ref 8.6–10.5)
CASTS URNS MICRO: NORMAL
CHLORIDE SERPL-SCNC: 102 MMOL/L (ref 98–107)
CHOLEST SERPL-MCNC: 186 MG/DL (ref 0–200)
CO2 SERPL-SCNC: 25.9 MMOL/L (ref 22–29)
COLOR UR: YELLOW
CREAT SERPL-MCNC: 0.86 MG/DL (ref 0.57–1)
EOSINOPHIL # BLD AUTO: 0.14 10*3/MM3 (ref 0–0.4)
EOSINOPHIL NFR BLD AUTO: 3 % (ref 0.3–6.2)
EPI CELLS #/AREA URNS HPF: NORMAL /HPF
ERYTHROCYTE [DISTWIDTH] IN BLOOD BY AUTOMATED COUNT: 11.9 % (ref 12.3–15.4)
GLOBULIN SER CALC-MCNC: 3.6 GM/DL
GLUCOSE SERPL-MCNC: 94 MG/DL (ref 65–99)
GLUCOSE UR QL: NEGATIVE
HBA1C MFR BLD: 5.3 % (ref 4.8–5.6)
HCT VFR BLD AUTO: 35.5 % (ref 34–46.6)
HDLC SERPL-MCNC: 49 MG/DL (ref 40–60)
HGB BLD-MCNC: 12 G/DL (ref 12–15.9)
HGB UR QL STRIP: NEGATIVE
IMM GRANULOCYTES # BLD AUTO: 0.01 10*3/MM3 (ref 0–0.05)
IMM GRANULOCYTES NFR BLD AUTO: 0.2 % (ref 0–0.5)
KETONES UR QL STRIP: NEGATIVE
LDLC SERPL CALC-MCNC: 121 MG/DL (ref 0–100)
LDLC/HDLC SERPL: 2.43 {RATIO}
LEUKOCYTE ESTERASE UR QL STRIP: NEGATIVE
LYMPHOCYTES # BLD AUTO: 1.13 10*3/MM3 (ref 0.7–3.1)
LYMPHOCYTES NFR BLD AUTO: 24.5 % (ref 19.6–45.3)
MCH RBC QN AUTO: 32.6 PG (ref 26.6–33)
MCHC RBC AUTO-ENTMCNC: 33.8 G/DL (ref 31.5–35.7)
MCV RBC AUTO: 96.5 FL (ref 79–97)
MONOCYTES # BLD AUTO: 0.45 10*3/MM3 (ref 0.1–0.9)
MONOCYTES NFR BLD AUTO: 9.8 % (ref 5–12)
NEUTROPHILS # BLD AUTO: 2.85 10*3/MM3 (ref 1.7–7)
NEUTROPHILS NFR BLD AUTO: 61.8 % (ref 42.7–76)
NITRITE UR QL STRIP: NEGATIVE
NRBC BLD AUTO-RTO: 0.2 /100 WBC (ref 0–0.2)
PH UR STRIP: 6 [PH] (ref 5–8)
PLATELET # BLD AUTO: 209 10*3/MM3 (ref 140–450)
POTASSIUM SERPL-SCNC: 4.8 MMOL/L (ref 3.5–5.2)
PROT SERPL-MCNC: 8.2 G/DL (ref 6–8.5)
PROT UR QL STRIP: NEGATIVE
RBC # BLD AUTO: 3.68 10*6/MM3 (ref 3.77–5.28)
RBC #/AREA URNS HPF: NORMAL /HPF
SODIUM SERPL-SCNC: 138 MMOL/L (ref 136–145)
SP GR UR: 1.02 (ref 1–1.03)
TRIGL SERPL-MCNC: 89 MG/DL (ref 0–150)
UROBILINOGEN UR STRIP-MCNC: NORMAL MG/DL
VLDLC SERPL CALC-MCNC: 16 MG/DL (ref 5–40)
WBC # BLD AUTO: 4.61 10*3/MM3 (ref 3.4–10.8)
WBC #/AREA URNS HPF: NORMAL /HPF

## 2021-06-09 ENCOUNTER — OFFICE VISIT (OUTPATIENT)
Dept: FAMILY MEDICINE CLINIC | Facility: CLINIC | Age: 66
End: 2021-06-09

## 2021-06-09 VITALS
DIASTOLIC BLOOD PRESSURE: 82 MMHG | BODY MASS INDEX: 28.71 KG/M2 | HEART RATE: 79 BPM | WEIGHT: 156 LBS | OXYGEN SATURATION: 97 % | TEMPERATURE: 97.4 F | RESPIRATION RATE: 18 BRPM | HEIGHT: 62 IN | SYSTOLIC BLOOD PRESSURE: 124 MMHG

## 2021-06-09 DIAGNOSIS — F41.9 ANXIETY: Primary | ICD-10-CM

## 2021-06-09 PROCEDURE — 99214 OFFICE O/P EST MOD 30 MIN: CPT | Performed by: INTERNAL MEDICINE

## 2021-06-10 NOTE — PROGRESS NOTES
Subjective   Augustina Rodgers is a 66 y.o. female. Patient is here today for   Chief Complaint   Patient presents with   • Anxiety     6 MONTH F/U AND LAB RESULTS    • Hyperglycemia          Vitals:    06/09/21 1257   BP: 124/82   Pulse: 79   Resp: 18   Temp: 97.4 °F (36.3 °C)   SpO2: 97%     Body mass index is 28.53 kg/m².      Past Medical History:   Diagnosis Date   • Anxiety    • Asthma    • Depression    • Family history of colon cancer 2/8/2017    Father   • Heart murmur    • History of colon polyps     Dr. Robles   • Hyperplastic colon polyp    • Internal hemorrhoids    • Mitral valve regurgitation 04/03/2012    mild to moderate   • Posterior vitreous detachment of right eye 09/2016    Dr. Bev Wilder   • Sjogren's disease (CMS/HCC)    • Tricuspid regurgitation 04/03/2012    mild to moderate      Allergies   Allergen Reactions   • Ji-1 [Lidocaine] Other (See Comments)     Elevates heart rate   • Cephalosporins Other (See Comments)     Elevates heart rate      Social History     Socioeconomic History   • Marital status:      Spouse name: Not on file   • Number of children: Not on file   • Years of education: Not on file   • Highest education level: Not on file   Tobacco Use   • Smoking status: Never Smoker   • Smokeless tobacco: Never Used   Vaping Use   • Vaping Use: Never used   Substance and Sexual Activity   • Alcohol use: Yes     Alcohol/week: 7.0 standard drinks     Types: 7 Standard drinks or equivalent per week     Comment: socially   • Drug use: No   • Sexual activity: Not Currently        Current Outpatient Medications:   •  ALPRAZolam (XANAX) 0.25 MG tablet, Take 0.25 mg by mouth 2 (Two) Times a Day As Needed for anxiety., Disp: , Rfl:   •  Calcium-Magnesium-Vitamin D (CALCIUM 1200+D3 PO), , Disp: , Rfl:   •  Multiple Vitamins-Minerals (MULTIVITAMIN ADULT PO), Take  by mouth., Disp: , Rfl:      Objective     She has generalized anxiety which she feels are well controlled.    She had some  labs done last week which we are to review today.    She tells me that she feels well.    Anxiety        Hyperglycemia         Review of Systems   Constitutional: Negative.    HENT: Negative.    Respiratory: Negative.    Cardiovascular: Negative.    Musculoskeletal: Negative.    Psychiatric/Behavioral: Negative.        Physical Exam  Vitals and nursing note reviewed.   Constitutional:       Appearance: Normal appearance.      Comments: Pleasant, neatly groomed, no distress.   Cardiovascular:      Rate and Rhythm: Regular rhythm.      Heart sounds: Normal heart sounds. No murmur heard.   No gallop.    Pulmonary:      Effort: No respiratory distress.      Breath sounds: Normal breath sounds. No wheezing or rales.   Neurological:      Mental Status: She is alert and oriented to person, place, and time.   Psychiatric:         Mood and Affect: Mood normal.         Behavior: Behavior normal.         Thought Content: Thought content normal.           Problems Addressed this Visit        Mental Health    Anxiety - Primary      Diagnoses       Codes Comments    Anxiety    -  Primary ICD-10-CM: F41.9  ICD-9-CM: 300.00             PLAN  She and I reviewed her labs.  She has had elevated fasting glucoses in the past which she no longer has.    Her lipid profile looks good with an LDL of 121 however that makes me wonder about stratifying her risk for vascular disease and I asked her to get a carotid artery ultrasound to screen for plaque and an aortic ultrasound to screen for aortic aneurysms.    Her situational anxiety is well controlled on her infrequent use of alprazolam 0.25 mg twice daily as needed.    I asked her to follow-up in about 6 months and as needed.    She should follow-up for a Medicare wellness visit once yearly.  No follow-ups on file.

## 2021-09-29 ENCOUNTER — IMMUNIZATION (OUTPATIENT)
Dept: VACCINE CLINIC | Facility: HOSPITAL | Age: 66
End: 2021-09-29

## 2021-09-29 PROCEDURE — 91300 HC SARSCOV02 VAC 30MCG/0.3ML IM: CPT | Performed by: INTERNAL MEDICINE

## 2021-09-29 PROCEDURE — 0003A: CPT | Performed by: INTERNAL MEDICINE

## 2021-12-07 DIAGNOSIS — Z13.29 SCREENING FOR THYROID DISORDER: ICD-10-CM

## 2021-12-07 DIAGNOSIS — R73.9 HYPERGLYCEMIA: ICD-10-CM

## 2021-12-07 DIAGNOSIS — Z13.220 SCREENING FOR CHOLESTEROL LEVEL: ICD-10-CM

## 2021-12-07 DIAGNOSIS — Z00.00 WELLNESS EXAMINATION: ICD-10-CM

## 2021-12-08 LAB
ALBUMIN SERPL-MCNC: 4.3 G/DL (ref 3.8–4.8)
ALBUMIN/GLOB SERPL: 1.1 {RATIO} (ref 1.2–2.2)
ALP SERPL-CCNC: 66 IU/L (ref 44–121)
ALT SERPL-CCNC: 15 IU/L (ref 0–32)
AST SERPL-CCNC: 22 IU/L (ref 0–40)
BASOPHILS # BLD AUTO: 0 X10E3/UL (ref 0–0.2)
BASOPHILS NFR BLD AUTO: 1 %
BILIRUB SERPL-MCNC: 0.3 MG/DL (ref 0–1.2)
BUN SERPL-MCNC: 21 MG/DL (ref 8–27)
BUN/CREAT SERPL: 21 (ref 12–28)
CALCIUM SERPL-MCNC: 9.1 MG/DL (ref 8.7–10.3)
CHLORIDE SERPL-SCNC: 104 MMOL/L (ref 96–106)
CHOLEST SERPL-MCNC: 183 MG/DL (ref 100–199)
CO2 SERPL-SCNC: 23 MMOL/L (ref 20–29)
CREAT SERPL-MCNC: 0.99 MG/DL (ref 0.57–1)
EOSINOPHIL # BLD AUTO: 0.1 X10E3/UL (ref 0–0.4)
EOSINOPHIL NFR BLD AUTO: 4 %
ERYTHROCYTE [DISTWIDTH] IN BLOOD BY AUTOMATED COUNT: 11.8 % (ref 11.7–15.4)
GLOBULIN SER CALC-MCNC: 3.8 G/DL (ref 1.5–4.5)
GLUCOSE SERPL-MCNC: 95 MG/DL (ref 65–99)
HCT VFR BLD AUTO: 34.4 % (ref 34–46.6)
HDLC SERPL-MCNC: 45 MG/DL
HGB BLD-MCNC: 11.6 G/DL (ref 11.1–15.9)
IMM GRANULOCYTES # BLD AUTO: 0 X10E3/UL (ref 0–0.1)
IMM GRANULOCYTES NFR BLD AUTO: 0 %
LDLC SERPL CALC-MCNC: 125 MG/DL (ref 0–99)
LDLC/HDLC SERPL: 2.8 RATIO (ref 0–3.2)
LYMPHOCYTES # BLD AUTO: 1.3 X10E3/UL (ref 0.7–3.1)
LYMPHOCYTES NFR BLD AUTO: 32 %
MCH RBC QN AUTO: 33 PG (ref 26.6–33)
MCHC RBC AUTO-ENTMCNC: 33.7 G/DL (ref 31.5–35.7)
MCV RBC AUTO: 98 FL (ref 79–97)
MONOCYTES # BLD AUTO: 0.4 X10E3/UL (ref 0.1–0.9)
MONOCYTES NFR BLD AUTO: 11 %
NEUTROPHILS # BLD AUTO: 2.1 X10E3/UL (ref 1.4–7)
NEUTROPHILS NFR BLD AUTO: 52 %
PLATELET # BLD AUTO: 211 X10E3/UL (ref 150–450)
POTASSIUM SERPL-SCNC: 4.1 MMOL/L (ref 3.5–5.2)
PROT SERPL-MCNC: 8.1 G/DL (ref 6–8.5)
RBC # BLD AUTO: 3.52 X10E6/UL (ref 3.77–5.28)
SODIUM SERPL-SCNC: 140 MMOL/L (ref 134–144)
TRIGL SERPL-MCNC: 70 MG/DL (ref 0–149)
TSH SERPL DL<=0.005 MIU/L-ACNC: 3.22 UIU/ML (ref 0.45–4.5)
VLDLC SERPL CALC-MCNC: 13 MG/DL (ref 5–40)
WBC # BLD AUTO: 4 X10E3/UL (ref 3.4–10.8)

## 2021-12-14 ENCOUNTER — OFFICE VISIT (OUTPATIENT)
Dept: FAMILY MEDICINE CLINIC | Facility: CLINIC | Age: 66
End: 2021-12-14

## 2021-12-14 VITALS
WEIGHT: 165 LBS | RESPIRATION RATE: 18 BRPM | DIASTOLIC BLOOD PRESSURE: 80 MMHG | HEART RATE: 74 BPM | SYSTOLIC BLOOD PRESSURE: 132 MMHG | HEIGHT: 62 IN | BODY MASS INDEX: 30.36 KG/M2 | TEMPERATURE: 97.4 F | OXYGEN SATURATION: 96 %

## 2021-12-14 DIAGNOSIS — Z00.00 MEDICARE ANNUAL WELLNESS VISIT, SUBSEQUENT: ICD-10-CM

## 2021-12-14 DIAGNOSIS — E66.3 OVERWEIGHT (BMI 25.0-29.9): Primary | ICD-10-CM

## 2021-12-14 PROCEDURE — G0439 PPPS, SUBSEQ VISIT: HCPCS | Performed by: INTERNAL MEDICINE

## 2021-12-14 PROCEDURE — 1125F AMNT PAIN NOTED PAIN PRSNT: CPT | Performed by: INTERNAL MEDICINE

## 2021-12-14 PROCEDURE — 1160F RVW MEDS BY RX/DR IN RCRD: CPT | Performed by: INTERNAL MEDICINE

## 2021-12-14 PROCEDURE — 1170F FXNL STATUS ASSESSED: CPT | Performed by: INTERNAL MEDICINE

## 2021-12-14 PROCEDURE — 96160 PT-FOCUSED HLTH RISK ASSMT: CPT | Performed by: INTERNAL MEDICINE

## 2021-12-14 NOTE — PROGRESS NOTES
The ABCs of the Annual Wellness Visit  Subsequent Medicare Wellness Visit    Chief Complaint   Patient presents with   • Medicare Wellness-subsequent     wellness      Subjective    History of Present Illness:  Augustina Rodgers is a 66 y.o. female who presents for a Subsequent Medicare Wellness Visit.    The following portions of the patient's history were reviewed and   updated as appropriate: allergies, current medications, past family history, past medical history, past social history, past surgical history and problem list.    Compared to one year ago, the patient feels her physical   health is the same.    Compared to one year ago, the patient feels her mental   health is the same.    Recent Hospitalizations:  She was not admitted to the hospital during the last year.       Current Medical Providers:  Patient Care Team:  Dmitri Tavares MD as PCP - General (Internal Medicine)  Sean Camacho MD (Inactive) as Consulting Physician (Obstetrics and Gynecology)  Xavier Robles MD as Consulting Physician (Gastroenterology)  Bev Wilder MD as Consulting Physician (Ophthalmology)    Outpatient Medications Prior to Visit   Medication Sig Dispense Refill   • Calcium-Magnesium-Vitamin D (CALCIUM 1200+D3 PO)      • Multiple Vitamins-Minerals (MULTIVITAMIN ADULT PO) Take  by mouth.     • ALPRAZolam (XANAX) 0.25 MG tablet Take 0.25 mg by mouth 2 (Two) Times a Day As Needed for anxiety.       No facility-administered medications prior to visit.       No opioid medication identified on active medication list. I have reviewed chart for other potential  high risk medication/s and harmful drug interactions in the elderly.          Aspirin is not on active medication list.  Aspirin use is not indicated based on review of current medical condition/s. Risk of harm outweighs potential benefits.  .    Patient Active Problem List   Diagnosis   • History of colon polyps   • Family history of colon cancer   • Anxiety   •  "Major depressive disorder with single episode, in full remission (HCC)   • Colon polyp   • FH: colon cancer   • Overweight (BMI 25.0-29.9)   • Hyperglycemia   • Dry mouth   • Screening for lipid disorders   • Medicare annual wellness visit, subsequent     Advance Care Planning  Advance Directive is on file.  ACP discussion was held with the patient during this visit. Patient has an advance directive in EMR which is still valid.     Review of Systems   Constitutional: Positive for fatigue.   HENT: Negative.    Respiratory: Negative.    Cardiovascular: Negative.    Musculoskeletal: Negative.    Psychiatric/Behavioral: Negative.         Objective    Vitals:    12/14/21 0901   BP: 132/80   Pulse: 74   Resp: 18   Temp: 97.4 °F (36.3 °C)   TempSrc: Temporal   SpO2: 96%   Weight: 74.8 kg (165 lb)   Height: 157.5 cm (62.01\")     BMI Readings from Last 1 Encounters:   12/14/21 30.17 kg/m²   BMI is above normal parameters. Recommendations include: exercise counseling    Does the patient have evidence of cognitive impairment? No    Physical Exam  Vitals and nursing note reviewed.   Constitutional:       Appearance: Normal appearance. She is obese.      Comments: Pleasant, neatly groomed, in no distress.   Neck:      Vascular: No carotid bruit.   Cardiovascular:      Rate and Rhythm: Regular rhythm.      Heart sounds: Normal heart sounds. No murmur heard.  No gallop.    Pulmonary:      Effort: No respiratory distress.      Breath sounds: Normal breath sounds. No wheezing or rales.   Neurological:      Mental Status: She is alert and oriented to person, place, and time.   Psychiatric:         Mood and Affect: Mood normal.         Behavior: Behavior normal.         Thought Content: Thought content normal.       Lab Results   Component Value Date    CHLPL 183 12/07/2021    TRIG 70 12/07/2021    HDL 45 12/07/2021     (H) 12/07/2021    VLDL 13 12/07/2021            HEALTH RISK ASSESSMENT    Smoking Status:  Social History "     Tobacco Use   Smoking Status Never Smoker   Smokeless Tobacco Never Used     Alcohol Consumption:  Social History     Substance and Sexual Activity   Alcohol Use Yes   • Alcohol/week: 7.0 standard drinks   • Types: 7 Standard drinks or equivalent per week    Comment: socially     Fall Risk Screen:    MARKO Fall Risk Assessment was completed, and patient is at LOW risk for falls.Assessment completed on:12/14/2021    Depression Screening:  PHQ-2/PHQ-9 Depression Screening 12/14/2021   Little interest or pleasure in doing things 0   Feeling down, depressed, or hopeless 0   Trouble falling or staying asleep, or sleeping too much -   Feeling tired or having little energy -   Poor appetite or overeating -   Feeling bad about yourself - or that you are a failure or have let yourself or your family down -   Trouble concentrating on things, such as reading the newspaper or watching television -   Moving or speaking so slowly that other people could have noticed. Or the opposite - being so fidgety or restless that you have been moving around a lot more than usual -   Thoughts that you would be better off dead, or of hurting yourself in some way -   Total Score 0   If you checked off any problems, how difficult have these problems made it for you to do your work, take care of things at home, or get along with other people? -       Health Habits and Functional and Cognitive Screening:  Functional & Cognitive Status 12/14/2021   Do you have difficulty preparing food and eating? No   Do you have difficulty bathing yourself, getting dressed or grooming yourself? No   Do you have difficulty using the toilet? No   Do you have difficulty moving around from place to place? No   Do you have trouble with steps or getting out of a bed or a chair? No   Current Diet Low Fat Diet   Dental Exam Up to date   Eye Exam Up to date   Exercise (times per week) 4 times per week   Current Exercises Include Walking;Pilates   Do you need help  using the phone?  No   Are you deaf or do you have serious difficulty hearing?  No   Do you need help with transportation? No   Do you need help shopping? No   Do you need help preparing meals?  No   Do you need help with housework?  No   Do you need help with laundry? No   Do you need help taking your medications? No   Do you need help managing money? No   Do you ever drive or ride in a car without wearing a seat belt? No   Have you felt unusual stress, anger or loneliness in the last month? No   Who do you live with? Spouse   If you need help, do you have trouble finding someone available to you? No   Have you been bothered in the last four weeks by sexual problems? No   Do you have difficulty concentrating, remembering or making decisions? No       Age-appropriate Screening Schedule:  Refer to the list below for future screening recommendations based on patient's age, sex and/or medical conditions. Orders for these recommended tests are listed in the plan section. The patient has been provided with a written plan.    Health Maintenance   Topic Date Due   • PAP SMEAR  12/05/2022   • LIPID PANEL  12/07/2022   • DXA SCAN  01/14/2023   • MAMMOGRAM  05/07/2023   • TDAP/TD VACCINES (3 - Td or Tdap) 09/23/2028   • INFLUENZA VACCINE  Completed   • ZOSTER VACCINE  Completed              Assessment/Plan   CMS Preventative Services Quick Reference  Risk Factors Identified During Encounter  None Identified  The above risks/problems have been discussed with the patient.  Follow up actions/plans if indicated are seen below in the Assessment/Plan Section.  Pertinent information has been shared with the patient in the After Visit Summary.    Diagnoses and all orders for this visit:    1. Overweight (BMI 25.0-29.9) (Primary)    2. Medicare annual wellness visit, subsequent        Follow Up:    She and I reviewed her labs.    Her only significant medical issue is her obesity.  Today she has BMI of 30.17 and her actual BMI would be  a bit less than 30.  Of asked her to do her best to lose weight via regular aerobic activity per American Heart Association guidelines and decrease caloric intake.    I asked her to follow-up in 1 year.  No follow-ups on file.     An After Visit Summary and PPPS were made available to the patient.                Patent

## 2021-12-22 ENCOUNTER — TRANSCRIBE ORDERS (OUTPATIENT)
Dept: ADMINISTRATIVE | Facility: HOSPITAL | Age: 66
End: 2021-12-22

## 2021-12-22 ENCOUNTER — OFFICE VISIT (OUTPATIENT)
Dept: FAMILY MEDICINE CLINIC | Facility: CLINIC | Age: 66
End: 2021-12-22

## 2021-12-22 VITALS
DIASTOLIC BLOOD PRESSURE: 90 MMHG | TEMPERATURE: 98.3 F | RESPIRATION RATE: 18 BRPM | BODY MASS INDEX: 30.36 KG/M2 | HEART RATE: 113 BPM | HEIGHT: 62 IN | SYSTOLIC BLOOD PRESSURE: 190 MMHG | OXYGEN SATURATION: 98 % | WEIGHT: 165 LBS

## 2021-12-22 DIAGNOSIS — U07.1 CLINICAL DIAGNOSIS OF SEVERE ACUTE RESPIRATORY SYNDROME CORONAVIRUS 2 (SARS-COV-2) DISEASE: Primary | ICD-10-CM

## 2021-12-22 DIAGNOSIS — J34.89 NASAL DRAINAGE: ICD-10-CM

## 2021-12-22 DIAGNOSIS — J06.9 UPPER RESPIRATORY TRACT INFECTION DUE TO COVID-19 VIRUS: Primary | ICD-10-CM

## 2021-12-22 DIAGNOSIS — R05.9 COUGH: ICD-10-CM

## 2021-12-22 DIAGNOSIS — U07.1 UPPER RESPIRATORY TRACT INFECTION DUE TO COVID-19 VIRUS: Primary | ICD-10-CM

## 2021-12-22 PROCEDURE — 99213 OFFICE O/P EST LOW 20 MIN: CPT | Performed by: STUDENT IN AN ORGANIZED HEALTH CARE EDUCATION/TRAINING PROGRAM

## 2021-12-22 RX ORDER — EPINEPHRINE 1 MG/ML
0.3 INJECTION, SOLUTION, CONCENTRATE INTRAVENOUS AS NEEDED
OUTPATIENT
Start: 2021-12-24

## 2021-12-22 RX ORDER — DIPHENHYDRAMINE HYDROCHLORIDE 50 MG/ML
50 INJECTION INTRAMUSCULAR; INTRAVENOUS ONCE AS NEEDED
OUTPATIENT
Start: 2021-12-24

## 2021-12-22 RX ORDER — SODIUM CHLORIDE 9 MG/ML
30 INJECTION, SOLUTION INTRAVENOUS ONCE
OUTPATIENT
Start: 2021-12-24

## 2021-12-22 RX ORDER — DIPHENHYDRAMINE HCL 50 MG
50 CAPSULE ORAL ONCE AS NEEDED
OUTPATIENT
Start: 2021-12-24

## 2021-12-22 RX ORDER — METHYLPREDNISOLONE SODIUM SUCCINATE 125 MG/2ML
125 INJECTION, POWDER, LYOPHILIZED, FOR SOLUTION INTRAMUSCULAR; INTRAVENOUS AS NEEDED
OUTPATIENT
Start: 2021-12-24

## 2021-12-23 LAB
LABCORP SARS-COV-2, NAA 2 DAY TAT: NORMAL
SARS-COV-2 RNA RESP QL NAA+PROBE: NOT DETECTED

## 2021-12-24 ENCOUNTER — HOSPITAL ENCOUNTER (OUTPATIENT)
Dept: INFUSION THERAPY | Facility: HOSPITAL | Age: 66
Discharge: HOME | End: 2021-12-24

## 2021-12-28 ENCOUNTER — TELEPHONE (OUTPATIENT)
Dept: FAMILY MEDICINE CLINIC | Facility: CLINIC | Age: 66
End: 2021-12-28

## 2021-12-28 NOTE — TELEPHONE ENCOUNTER
Caller: Augustina Rodgers    Relationship: Self    Best call back number: [phone]     What is the best time to reach you: ANY     Who are you requesting to speak with (clinical staff, provider,  specific staff member): CLINICAL STAFF     Do you know the name of the person who called: N/A     What was the call regarding: PATIENT IS HAVING DRAINAGE AND WANTED TO KNOW IF SHE CAN TAKE SUDAFED    Do you require a callback: YES

## 2021-12-29 NOTE — PROGRESS NOTES
"Chief Complaint  Cough (STARTED 12/16/21) and Nasal Congestion (HOME COVID TEST-POSITIVE)    Subjective          Augustina Rodgers presents to [facility]  For cough, nasal congestion started on 12/16/2021, patient reported she tested herself with home Covid test which came out positive and she would like to get monoclonal antibody infusion.  Review of system is negative for fever, headache, chest pain, shortness of breath, palpitation, nausea, vomiting, any recent change in bladder habits.        Objective   Vital Signs:   BP (!) 190/90 (BP Location: Left arm, Patient Position: Sitting)   Pulse 113   Temp 98.3 °F (36.8 °C) (Oral)   Resp 18   Ht 157.5 cm (62.01\")   Wt 74.8 kg (165 lb)   SpO2 98%   BMI 30.17 kg/m²     Physical Exam  Vitals (BP was on the higher side in the office 190/90 and recheck of the blood pressure was 170/86) reviewed.   HENT:      Head: Normocephalic and atraumatic.      Mouth/Throat:      Mouth: Mucous membranes are moist.      Pharynx: Oropharynx is clear.   Eyes:      Extraocular Movements: Extraocular movements intact.      Conjunctiva/sclera: Conjunctivae normal.      Pupils: Pupils are equal, round, and reactive to light.   Cardiovascular:      Rate and Rhythm: Normal rate and regular rhythm.   Pulmonary:      Effort: Pulmonary effort is normal.      Breath sounds: Normal breath sounds.   Abdominal:      General: Bowel sounds are normal.      Palpations: Abdomen is soft.   Musculoskeletal:         General: Normal range of motion.      Cervical back: Neck supple.   Skin:     General: Skin is warm.      Capillary Refill: Capillary refill takes less than 2 seconds.   Neurological:      General: No focal deficit present.      Mental Status: She is alert and oriented to person, place, and time. Mental status is at baseline.   Psychiatric:         Mood and Affect: Mood normal.        Result Review :                 Assessment and Plan    Diagnoses and all " orders for this visit:    1. Upper respiratory tract infection due to COVID-19 virus (Primary)  Comments:  Monoclonal antibody infusion prescription sent to the pharmacy after getting patient permission, Covid PCR test also ordered today    2. Cough  -     COVID-19,[facility] ROUTINE, NP/OP SWAB IN TRANSPORT MEDIA OR ESWAB 72 HR TAT - Swab, Nasopharynx    3. Nasal drainage  -     COVID-19,[facility] ROUTINE, NP/OP SWAB IN TRANSPORT MEDIA OR ESWAB 72 HR TAT - Swab, Nasopharynx    Other orders  -     SARS-CoV-2, [facility]YLA 2 DAY TAT - ,    Warning symptoms like shortness of breath, using neck muscles for breathing, unable to speak in full sentences should not be ignored and patient should go immediately to ER for further evaluation.  Patient was informed that she should isolate herself for at least 2 weeks in order to prevent spread of infection.  Patient agrees and showed verbalized understanding  Follow Up   Return if symptoms worsen or fail to improve.  Patient was given instructions and counseling regarding her condition or for health maintenance advice. Please see specific information pulled into the AVS if appropriate.

## 2022-02-28 ENCOUNTER — OFFICE VISIT (OUTPATIENT)
Dept: FAMILY MEDICINE CLINIC | Facility: CLINIC | Age: 67
End: 2022-02-28

## 2022-02-28 VITALS
BODY MASS INDEX: 29.63 KG/M2 | HEART RATE: 103 BPM | RESPIRATION RATE: 18 BRPM | TEMPERATURE: 98 F | WEIGHT: 161 LBS | SYSTOLIC BLOOD PRESSURE: 150 MMHG | DIASTOLIC BLOOD PRESSURE: 90 MMHG | HEIGHT: 62 IN | OXYGEN SATURATION: 98 %

## 2022-02-28 DIAGNOSIS — R11.0 NAUSEA: Primary | ICD-10-CM

## 2022-02-28 PROCEDURE — 99214 OFFICE O/P EST MOD 30 MIN: CPT | Performed by: INTERNAL MEDICINE

## 2022-02-28 RX ORDER — ESCITALOPRAM OXALATE 10 MG/1
10 TABLET ORAL DAILY
Qty: 90 TABLET | Refills: 1 | Status: SHIPPED | OUTPATIENT
Start: 2022-02-28 | End: 2022-06-07 | Stop reason: SDUPTHER

## 2022-02-28 RX ORDER — ALPRAZOLAM 0.25 MG/1
0.25 TABLET ORAL 2 TIMES DAILY PRN
Qty: 40 TABLET | Refills: 1 | Status: SHIPPED | OUTPATIENT
Start: 2022-02-28

## 2022-03-01 LAB
ALBUMIN SERPL-MCNC: 4.7 G/DL (ref 3.8–4.8)
ALBUMIN/GLOB SERPL: 1.1 {RATIO} (ref 1.2–2.2)
ALP SERPL-CCNC: 67 IU/L (ref 44–121)
ALT SERPL-CCNC: 12 IU/L (ref 0–32)
APPEARANCE UR: CLEAR
AST SERPL-CCNC: 21 IU/L (ref 0–40)
BACTERIA #/AREA URNS HPF: NORMAL /[HPF]
BASOPHILS # BLD AUTO: 0 X10E3/UL (ref 0–0.2)
BASOPHILS NFR BLD AUTO: 0 %
BILIRUB SERPL-MCNC: 0.5 MG/DL (ref 0–1.2)
BILIRUB UR QL STRIP: NEGATIVE
BUN SERPL-MCNC: 10 MG/DL (ref 8–27)
BUN/CREAT SERPL: 12 (ref 12–28)
CALCIUM SERPL-MCNC: 10 MG/DL (ref 8.7–10.3)
CASTS URNS QL MICRO: NORMAL /LPF
CHLORIDE SERPL-SCNC: 101 MMOL/L (ref 96–106)
CO2 SERPL-SCNC: 20 MMOL/L (ref 20–29)
COLOR UR: YELLOW
CREAT SERPL-MCNC: 0.84 MG/DL (ref 0.57–1)
EGFR GENE MUT ANL BLD/T: 77 ML/MIN/1.73
EOSINOPHIL # BLD AUTO: 0.1 X10E3/UL (ref 0–0.4)
EOSINOPHIL NFR BLD AUTO: 1 %
EPI CELLS #/AREA URNS HPF: NORMAL /HPF (ref 0–10)
ERYTHROCYTE [DISTWIDTH] IN BLOOD BY AUTOMATED COUNT: 12 % (ref 11.7–15.4)
GLOBULIN SER CALC-MCNC: 4.3 G/DL (ref 1.5–4.5)
GLUCOSE SERPL-MCNC: 97 MG/DL (ref 65–99)
GLUCOSE UR QL STRIP: NEGATIVE
HCT VFR BLD AUTO: 37.8 % (ref 34–46.6)
HGB BLD-MCNC: 13 G/DL (ref 11.1–15.9)
HGB UR QL STRIP: NEGATIVE
IMM GRANULOCYTES # BLD AUTO: 0 X10E3/UL (ref 0–0.1)
IMM GRANULOCYTES NFR BLD AUTO: 0 %
KETONES UR QL STRIP: ABNORMAL
LEUKOCYTE ESTERASE UR QL STRIP: ABNORMAL
LYMPHOCYTES # BLD AUTO: 1.1 X10E3/UL (ref 0.7–3.1)
LYMPHOCYTES NFR BLD AUTO: 22 %
MCH RBC QN AUTO: 33 PG (ref 26.6–33)
MCHC RBC AUTO-ENTMCNC: 34.4 G/DL (ref 31.5–35.7)
MCV RBC AUTO: 96 FL (ref 79–97)
MICRO URNS: ABNORMAL
MONOCYTES # BLD AUTO: 0.4 X10E3/UL (ref 0.1–0.9)
MONOCYTES NFR BLD AUTO: 9 %
NEUTROPHILS # BLD AUTO: 3.4 X10E3/UL (ref 1.4–7)
NEUTROPHILS NFR BLD AUTO: 68 %
NITRITE UR QL STRIP: NEGATIVE
PH UR STRIP: 5.5 [PH] (ref 5–7.5)
PLATELET # BLD AUTO: 241 X10E3/UL (ref 150–450)
POTASSIUM SERPL-SCNC: 4.2 MMOL/L (ref 3.5–5.2)
PROT SERPL-MCNC: 9 G/DL (ref 6–8.5)
PROT UR QL STRIP: NEGATIVE
RBC # BLD AUTO: 3.94 X10E6/UL (ref 3.77–5.28)
RBC #/AREA URNS HPF: NORMAL /HPF (ref 0–2)
SODIUM SERPL-SCNC: 139 MMOL/L (ref 134–144)
SP GR UR STRIP: 1.02 (ref 1–1.03)
UROBILINOGEN UR STRIP-MCNC: 1 MG/DL (ref 0.2–1)
WBC # BLD AUTO: 5 X10E3/UL (ref 3.4–10.8)
WBC #/AREA URNS HPF: NORMAL /HPF (ref 0–5)

## 2022-03-03 ENCOUNTER — TELEPHONE (OUTPATIENT)
Dept: FAMILY MEDICINE CLINIC | Facility: CLINIC | Age: 67
End: 2022-03-03

## 2022-03-03 NOTE — TELEPHONE ENCOUNTER
Caller: Augutsina Rodgers    Relationship: Self    Best call back number: [phone]     What is the best time to reach you: ANY    Who are you requesting to speak with (clinical staff, provider,  specific staff member): CLINICAL    What was the call regarding: PATIENT WOULD LIKE TO KNOW WHY THERE WAS A MICROSCOPIC EXAMINATION FOR THE URINALYSIS.    PATIENT ALSO STATED THAT SHE USED TO SEE DR. BELTRAN - PSYCHIATRIST - WHO IS NO LONGER PRACTICING , AND WOULD LIKE TO KNOW WHO DR. ADAMS WOULD REFERR    Do you require a callback: YES

## 2022-03-06 NOTE — PROGRESS NOTES
Subjective   Augustina Rodgers is a 66 y.o. female. Patient is here today for   Chief Complaint   Patient presents with   • Nausea     pt c/o nausea for the last week, has not been eating well, weight loss,  anxiety,          Vitals:    02/28/22 1440   BP: 150/90   Pulse: 103   Resp: 18   Temp: 98 °F (36.7 °C)   SpO2: 98%     Body mass index is 29.44 kg/m².      Past Medical History:   Diagnosis Date   • Anxiety    • Asthma    • Depression    • Family history of colon cancer 2/8/2017    Father   • Heart murmur    • History of colon polyps     Dr. Robles   • Hyperplastic colon polyp    • Internal hemorrhoids    • Mitral valve regurgitation 04/03/2012    mild to moderate   • Posterior vitreous detachment of right eye 09/2016    Dr. Bev Wilder   • Sjogren's disease (HCC)    • Tricuspid regurgitation 04/03/2012    mild to moderate      Allergies   Allergen Reactions   • Ji-1 [Lidocaine] Other (See Comments)     Elevates heart rate   • Cephalosporins Other (See Comments)     Elevates heart rate   • Procaine Palpitations      Social History     Socioeconomic History   • Marital status:    Tobacco Use   • Smoking status: Never Smoker   • Smokeless tobacco: Never Used   Vaping Use   • Vaping Use: Never used   Substance and Sexual Activity   • Alcohol use: Yes     Alcohol/week: 7.0 standard drinks     Types: 7 Standard drinks or equivalent per week     Comment: socially   • Drug use: No   • Sexual activity: Not Currently        Current Outpatient Medications:   •  Calcium-Magnesium-Vitamin D (CALCIUM 1200+D3 PO), , Disp: , Rfl:   •  Cholecalciferol (Vitamin D) 50 MCG (2000 UT) capsule, , Disp: , Rfl:   •  Multiple Vitamins-Minerals (MULTIVITAMIN ADULT PO), Take  by mouth., Disp: , Rfl:   •  ALPRAZolam (Xanax) 0.25 MG tablet, Take 1 tablet by mouth 2 (Two) Times a Day As Needed for Anxiety., Disp: 40 tablet, Rfl: 1  •  escitalopram (Lexapro) 10 MG tablet, Take 1 tablet by mouth Daily., Disp: 90 tablet, Rfl: 1      Objective     He has had nausea for more than 2 weeks.    She doesn't actually have any emesis.  She has no fevers or chills or abdominal pain.  Eating seems to make it worse.       Review of Systems   Constitutional: Negative.    HENT: Negative.    Respiratory: Negative.    Cardiovascular: Negative.    Gastrointestinal:        She complains of nausea.  Eating seems to make this worse.  She has had no associated fevers or chills or emesis.   Genitourinary: Negative.    Musculoskeletal: Negative.    Psychiatric/Behavioral: Negative.        Physical Exam  Vitals and nursing note reviewed.   Constitutional:       Appearance: Normal appearance.      Comments: Pleasant, neatly groomed, in no distress.   Cardiovascular:      Rate and Rhythm: Regular rhythm.      Heart sounds: Normal heart sounds. No murmur heard.    No gallop.   Abdominal:      General: Abdomen is flat. There is no distension.      Palpations: Abdomen is soft. There is no mass.      Tenderness: There is no abdominal tenderness. There is no guarding or rebound.   Neurological:      Mental Status: She is alert and oriented to person, place, and time.   Psychiatric:         Mood and Affect: Mood normal.         Behavior: Behavior normal.         Thought Content: Thought content normal.           Problems Addressed this Visit    None     Visit Diagnoses     Nausea    -  Primary    Relevant Orders    Comprehensive Metabolic Panel (Completed)    CBC & Differential (Completed)    Urinalysis With Microscopic If Indicated (No Culture) - Urine, Clean Catch (Completed)      Diagnoses       Codes Comments    Nausea    -  Primary ICD-10-CM: R11.0  ICD-9-CM: 787.02             PLAN  I have asked her to try omeprazole over-the-counter.    I'll check a CBC, urinalysis with microscopic exam if indicated, comprehensive metabolic panel.    If her symptoms persist, I would be inclined to send her back to her gastroenterologist (Dr. Robles) for further evaluation.  Which  may include a EGD.  No follow-ups on file.  Answers for HPI/ROS submitted by the patient on 2/26/2022  Please describe your symptoms.: Nausea  Have you had these symptoms before?: No  How long have you been having these symptoms?: 5-7 days  What is the primary reason for your visit?: Other

## 2022-03-07 NOTE — TELEPHONE ENCOUNTER
CALLED AND SPOKE WITH PT INFORMED HER DR. ADAMS DOES NOT HAVE ANY SPECIFIC PHYSIATRIST HE REFERS TO, AND SHE ASKED IF REFERRAL CAN BE SENT, WILL ASK DR. ADAMS IF ORDER CAN BE PUT IN

## 2022-03-09 DIAGNOSIS — F41.9 ANXIETY: Primary | ICD-10-CM

## 2022-03-31 ENCOUNTER — OFFICE VISIT (OUTPATIENT)
Dept: OBSTETRICS AND GYNECOLOGY | Age: 67
End: 2022-03-31

## 2022-03-31 VITALS
HEIGHT: 62 IN | BODY MASS INDEX: 29.44 KG/M2 | DIASTOLIC BLOOD PRESSURE: 76 MMHG | WEIGHT: 160 LBS | SYSTOLIC BLOOD PRESSURE: 124 MMHG

## 2022-03-31 DIAGNOSIS — Z12.4 SCREENING FOR MALIGNANT NEOPLASM OF THE CERVIX: ICD-10-CM

## 2022-03-31 DIAGNOSIS — Z01.419 ENCOUNTER FOR GYNECOLOGICAL EXAMINATION WITHOUT ABNORMAL FINDING: Primary | ICD-10-CM

## 2022-03-31 DIAGNOSIS — Z11.51 SPECIAL SCREENING EXAMINATION FOR HUMAN PAPILLOMAVIRUS (HPV): ICD-10-CM

## 2022-03-31 PROCEDURE — 99397 PER PM REEVAL EST PAT 65+ YR: CPT | Performed by: OBSTETRICS & GYNECOLOGY

## 2022-03-31 RX ORDER — OMEPRAZOLE 20 MG/1
TABLET, DELAYED RELEASE ORAL
COMMUNITY
Start: 2022-02-28

## 2022-03-31 NOTE — PROGRESS NOTES
Subjective     Chief Complaint   Patient presents with   • Gynecologic Exam     AC. Pt had MG 2021.        History of Present Illness    Augustina Rodgers is a 66 y.o.  who presents for annual exam.  Patient has had a difficult time.  Both her and her  had COVID.  She was sick for about 17 days.  She felt like it was very isolating and she was feeling depressed.  She was started on Lexapro by her primary care doctor and it is helping.  She is fully recovered.  No vaginal bleeding or pelvic pain.  She is not sexually active.  Her  has had some heart problems.  She watches her grandchildren on Monday and Friday.  Her Pap smears have always been normal.  Bone density did show some osteopenia but she is walking and using calcium and vitamin D.      Obstetric History:  OB History        2    Para   2    Term   2            AB        Living           SAB        IAB        Ectopic        Molar        Multiple        Live Births                   Menstrual History:     No LMP recorded (lmp unknown). Patient is postmenopausal.         Current contraception: post menopausal status  History of abnormal Pap smear: no  Received Gardasil immunization: no  Perform regular self breast exam : yes  Family history of uterine or ovarian cancer: no  Family History of colon cancer: yes - father 57   Family history of breast cancer: yes - PGM 80     Mammogram: up to date.  Colonoscopy: up to date. 2019 -  q 3  Due  - scheduled    DEXA: up to date. Osteopenia  Frax OK     Exercise: pilates - quit walks daily   Calcium/Vitamin D: adequate intake and uses supplements    The following portions of the patient's history were reviewed and updated as appropriate: allergies, current medications, past family history, past medical history, past social history, past surgical history and problem list.    Review of Systems    Review of Systems   Constitutional: Negative for fatigue.   Respiratory:  "Negative for shortness of breath.    Gastrointestinal: Negative for abdominal pain.   Genitourinary: Negative for dysuria.   Neurological: Negative for headaches.   Psychiatric/Behavioral: Negative for dysphoric mood.     Objective   Physical Exam    /76   Ht 157.5 cm (62\")   Wt 72.6 kg (160 lb)   LMP  (LMP Unknown)   Breastfeeding No   BMI 29.26 kg/m²     General:   alert, appears stated age and cooperative   Neck: thyroid normal to palpation   Heart: regular rate and rhythm   Lungs: clear to auscultation bilaterally   Abdomen: soft, non-tender, without masses or organomegaly   Breast: inspection negative, no nipple discharge or bleeding, no masses or nodularity palpable   Vulva: normal, Bartholin's, Urethra, Gotham's normal, no prolapse    Vagina: normal mucosa, normal discharge   Cervix: no cervical motion tenderness and no lesions, pap collected    Uterus: non-tender, normal shape and consistency   Adnexa: no mass, fullness, tenderness   Rectal: declined by the patient     Assessment/Plan   Diagnoses and all orders for this visit:    1. Encounter for gynecological examination without abnormal finding (Primary)  -     IGP, Aptima HPV, Rfx 16 / 18,45    2. Screening for malignant neoplasm of the cervix  -     IGP, Aptima HPV, Rfx 16 / 18,45    3. Special screening examination for human papillomavirus (HPV)  -     IGP, Aptima HPV, Rfx 16 / 18,45    Pap smear was sent off today but I think patient may be able to discontinue Paps after this since she has had a good record of normal Paps and is over the age of 65.  We discussed visits every 2 years or sooner if she has any bleeding or pelvic pain.    Check bone density at next visit.  Encourage patient to continue her good exercise program and to add in yoga or Pilates as she is doing.    All questions answered.  Breast self exam technique reviewed and patient encouraged to perform self-exam monthly.  Discussed healthy lifestyle modifications.  Recommended " 30 minutes of aerobic exercise five times per week.  Discussed calcium needs to prevent osteoporosis.

## 2022-06-01 ENCOUNTER — IMMUNIZATION (OUTPATIENT)
Dept: VACCINE CLINIC | Facility: HOSPITAL | Age: 67
End: 2022-06-01

## 2022-06-01 DIAGNOSIS — Z23 NEED FOR VACCINATION: Primary | ICD-10-CM

## 2022-06-01 PROCEDURE — 91305 HC SARSCOV2 VAC 30 MCG TRS-SUCR PFIZER: CPT | Performed by: INTERNAL MEDICINE

## 2022-06-01 PROCEDURE — 0054A HC ADM SARSCV2 30MCG TRS-SUCR BOOSTER: CPT | Performed by: INTERNAL MEDICINE

## 2022-06-07 ENCOUNTER — OFFICE VISIT (OUTPATIENT)
Dept: PSYCHIATRY | Facility: CLINIC | Age: 67
End: 2022-06-07

## 2022-06-07 VITALS
HEART RATE: 78 BPM | SYSTOLIC BLOOD PRESSURE: 142 MMHG | BODY MASS INDEX: 30.18 KG/M2 | WEIGHT: 165 LBS | OXYGEN SATURATION: 98 % | DIASTOLIC BLOOD PRESSURE: 88 MMHG

## 2022-06-07 DIAGNOSIS — F41.9 ANXIETY: Chronic | ICD-10-CM

## 2022-06-07 DIAGNOSIS — F33.42 RECURRENT MAJOR DEPRESSION IN COMPLETE REMISSION: Primary | Chronic | ICD-10-CM

## 2022-06-07 PROCEDURE — 90792 PSYCH DIAG EVAL W/MED SRVCS: CPT

## 2022-06-07 RX ORDER — ESCITALOPRAM OXALATE 10 MG/1
10 TABLET ORAL DAILY
Qty: 90 TABLET | Refills: 1 | Status: SHIPPED | OUTPATIENT
Start: 2022-06-07 | End: 2022-08-19

## 2022-08-19 DIAGNOSIS — F41.9 ANXIETY: Chronic | ICD-10-CM

## 2022-08-19 DIAGNOSIS — F33.42 RECURRENT MAJOR DEPRESSION IN COMPLETE REMISSION: Chronic | ICD-10-CM

## 2022-08-19 RX ORDER — ESCITALOPRAM OXALATE 10 MG/1
10 TABLET ORAL DAILY
Qty: 90 TABLET | Refills: 1 | Status: SHIPPED | OUTPATIENT
Start: 2022-08-19 | End: 2023-02-03 | Stop reason: SDUPTHER

## 2022-08-19 NOTE — TELEPHONE ENCOUNTER
Rx Refill Note  Requested Prescriptions     Pending Prescriptions Disp Refills   • escitalopram (LEXAPRO) 10 MG tablet [Pharmacy Med Name: ESCITALOPRAM 10MG TABLETS] 90 tablet 1     Sig: TAKE 1 TABLET BY MOUTH DAILY      Last office visit with prescribing clinician: 2/28/2022      Next office visit with prescribing clinician: 12/20/2022            Dede Gilbert MA  08/19/22, 08:58 EDT

## 2022-09-21 ENCOUNTER — TELEPHONE (OUTPATIENT)
Dept: GASTROENTEROLOGY | Facility: CLINIC | Age: 67
End: 2022-09-21

## 2022-09-21 ENCOUNTER — TRANSCRIBE ORDERS (OUTPATIENT)
Dept: ADMINISTRATIVE | Facility: HOSPITAL | Age: 67
End: 2022-09-21

## 2022-09-21 DIAGNOSIS — Z12.31 SCREENING MAMMOGRAM, ENCOUNTER FOR: Primary | ICD-10-CM

## 2022-09-21 NOTE — TELEPHONE ENCOUNTER
Caller: Augustina Rodgers    Relationship to patient: Self    Best call back number: [phone]    Type of visit: COLONOSCOPY AND ENDOSCOPY  Requested date: NEXT AVAILABLE     Additional notes: PATIENT CALLED IN ASKING TO SCHEDULE COLONOSCOPY AND ENDOSCOPY

## 2022-10-10 ENCOUNTER — OFFICE VISIT (OUTPATIENT)
Dept: FAMILY MEDICINE CLINIC | Facility: CLINIC | Age: 67
End: 2022-10-10

## 2022-10-10 VITALS
BODY MASS INDEX: 31.58 KG/M2 | DIASTOLIC BLOOD PRESSURE: 86 MMHG | OXYGEN SATURATION: 98 % | HEIGHT: 62 IN | SYSTOLIC BLOOD PRESSURE: 161 MMHG | WEIGHT: 171.6 LBS | HEART RATE: 83 BPM | TEMPERATURE: 98 F

## 2022-10-10 DIAGNOSIS — J20.9 ACUTE BRONCHITIS, UNSPECIFIED ORGANISM: ICD-10-CM

## 2022-10-10 DIAGNOSIS — J06.9 UPPER RESPIRATORY TRACT INFECTION, UNSPECIFIED TYPE: Primary | ICD-10-CM

## 2022-10-10 PROCEDURE — 99213 OFFICE O/P EST LOW 20 MIN: CPT | Performed by: STUDENT IN AN ORGANIZED HEALTH CARE EDUCATION/TRAINING PROGRAM

## 2022-10-10 RX ORDER — AZITHROMYCIN 250 MG/1
TABLET, FILM COATED ORAL
Qty: 6 TABLET | Refills: 0 | Status: SHIPPED | OUTPATIENT
Start: 2022-10-10 | End: 2022-12-14

## 2022-10-10 RX ORDER — INFLUENZA A VIRUS A/MICHIGAN/45/2015 X-275 (H1N1) ANTIGEN (FORMALDEHYDE INACTIVATED), INFLUENZA A VIRUS A/SINGAPORE/INFIMH-16-0019/2016 IVR-186 (H3N2) ANTIGEN (FORMALDEHYDE INACTIVATED), INFLUENZA B VIRUS B/PHUKET/3073/2013 ANTIGEN (FORMALDEHYDE INACTIVATED), AND INFLUENZA B VIRUS B/MARYLAND/15/2016 BX-69A ANTIGEN (FORMALDEHYDE INACTIVATED) 60; 60; 60; 60 UG/.7ML; UG/.7ML; UG/.7ML; UG/.7ML
INJECTION, SUSPENSION INTRAMUSCULAR
COMMUNITY
Start: 2022-09-20 | End: 2022-12-14

## 2022-10-10 NOTE — PROGRESS NOTES
"Chief Complaint  Nasal Congestion ( ) and Eye Drainage (X 1 day )    Subjective        Augustina Rodgers presents to [facility]  History of Present Illness  For nasal congestion and eye drainage for 1 day.  Patient was taking care of their grandkids and who all had upper respiratory tract infection symptoms and youngers grandkid was admitted in the hospital for RSV.  Denies having any fever or shortness of breath right now.  Review of system is negative for fever, headache, chest pain, shortness of breath, palpitation, nausea, vomiting, any recent change in bladder habits.        Objective   Vital Signs:  /86 (BP Location: Left arm, Patient Position: Sitting, Cuff Size: Adult)   Pulse 83   Temp 98 °F (36.7 °C) (Temporal)   Ht 157.5 cm (62.01\")   Wt 77.8 kg (171 lb 9.6 oz)   SpO2 98%   BMI 31.38 kg/m²   Estimated body mass index is 31.38 kg/m² as calculated from the following:    Height as of this encounter: 157.5 cm (62.01\").    Weight as of this encounter: 77.8 kg (171 lb 9.6 oz).          Physical Exam  HENT:      Head: Normocephalic and atraumatic.      Mouth/Throat:      Comments: White secretion can be seen at the back of the throat  Eyes:      Extraocular Movements: Extraocular movements intact.      Conjunctiva/sclera: Conjunctivae normal.      Pupils: Pupils are equal, round, and reactive to light.   Cardiovascular:      Rate and Rhythm: Normal rate and regular rhythm.   Pulmonary:      Effort: Pulmonary effort is normal.      Breath sounds: Normal breath sounds.   Abdominal:      General: Bowel sounds are normal.      Palpations: Abdomen is soft.   Musculoskeletal:         General: Normal range of motion.      Cervical back: Neck supple.   Skin:     General: Skin is warm.      Capillary Refill: Capillary refill takes less than 2 seconds.   Neurological:      General: No focal deficit present.      Mental Status: She is alert and oriented to person, place, and time. " Mental status is at baseline.   Psychiatric:         Mood and Affect: Mood normal.        Result Review :                Assessment and Plan   Diagnoses and all orders for this visit:    1. Upper respiratory tract infection, unspecified type (Primary)  Comments:  Most likely viral related, symptomatic treatment for now, monitor your symptoms and if it get worse RTC    2. Acute bronchitis, unspecified organism  Comments:  Can monitor symptoms for couple of days and if it seems worsening then can start Z-Yunior  Orders:  -     azithromycin (Zithromax Z-Yunior) 250 MG tablet; Take 2 tablets by mouth on day 1, then 1 tablet daily on days 2-5  Dispense: 6 tablet; Refill: 0             Follow Up   Return if symptoms worsen or fail to improve.  Patient was given instructions and counseling regarding her condition or for health maintenance advice. Please see specific information pulled into the AVS if appropriate.

## 2022-10-14 ENCOUNTER — PRE-PROCEDURE SCREENING (OUTPATIENT)
Dept: GASTROENTEROLOGY | Facility: CLINIC | Age: 67
End: 2022-10-14

## 2022-10-26 ENCOUNTER — APPOINTMENT (OUTPATIENT)
Dept: MAMMOGRAPHY | Facility: HOSPITAL | Age: 67
End: 2022-10-26

## 2022-11-02 ENCOUNTER — HOSPITAL ENCOUNTER (OUTPATIENT)
Dept: MAMMOGRAPHY | Facility: HOSPITAL | Age: 67
Discharge: HOME | End: 2022-11-02
Admitting: OBSTETRICS & GYNECOLOGY

## 2022-11-02 DIAGNOSIS — Z12.31 SCREENING MAMMOGRAM, ENCOUNTER FOR: ICD-10-CM

## 2022-11-02 PROCEDURE — 77067 SCR MAMMO BI INCL CAD: CPT

## 2022-11-02 PROCEDURE — 77063 BREAST TOMOSYNTHESIS BI: CPT

## 2022-12-08 NOTE — TELEPHONE ENCOUNTER
Caller: Augustina Rodgers    Relationship to patient: Self    Best call back number: [phone]    Patient is needing: WANTS TO SCHEDULE COLONOSCOPY/EGD

## 2022-12-12 DIAGNOSIS — R73.9 HYPERGLYCEMIA: ICD-10-CM

## 2022-12-12 DIAGNOSIS — E78.00 ELEVATED CHOLESTEROL: Primary | ICD-10-CM

## 2022-12-14 ENCOUNTER — OFFICE VISIT (OUTPATIENT)
Dept: FAMILY MEDICINE CLINIC | Facility: CLINIC | Age: 67
End: 2022-12-14

## 2022-12-14 ENCOUNTER — TELEPHONE (OUTPATIENT)
Dept: FAMILY MEDICINE CLINIC | Facility: CLINIC | Age: 67
End: 2022-12-14

## 2022-12-14 VITALS
RESPIRATION RATE: 18 BRPM | BODY MASS INDEX: 31.38 KG/M2 | SYSTOLIC BLOOD PRESSURE: 152 MMHG | HEIGHT: 62 IN | DIASTOLIC BLOOD PRESSURE: 80 MMHG | TEMPERATURE: 98.5 F | OXYGEN SATURATION: 95 % | HEART RATE: 110 BPM

## 2022-12-14 DIAGNOSIS — R50.9 FEVER, UNSPECIFIED FEVER CAUSE: ICD-10-CM

## 2022-12-14 DIAGNOSIS — L03.213 PERIORBITAL CELLULITIS, UNSPECIFIED LATERALITY: ICD-10-CM

## 2022-12-14 DIAGNOSIS — J01.10 ACUTE FRONTAL SINUSITIS, RECURRENCE NOT SPECIFIED: Primary | ICD-10-CM

## 2022-12-14 DIAGNOSIS — R22.9 SOFT TISSUE SWELLING: ICD-10-CM

## 2022-12-14 DIAGNOSIS — R21 RASH: ICD-10-CM

## 2022-12-14 DIAGNOSIS — S16.1XXA STRAIN OF NECK MUSCLE, INITIAL ENCOUNTER: ICD-10-CM

## 2022-12-14 LAB
EXPIRATION DATE: NORMAL
FLUAV AG UPPER RESP QL IA.RAPID: NOT DETECTED
FLUBV AG UPPER RESP QL IA.RAPID: NOT DETECTED
INTERNAL CONTROL: NORMAL
Lab: NORMAL
SARS-COV-2 RNA RESP QL NAA+PROBE: NOT DETECTED

## 2022-12-14 PROCEDURE — 87428 SARSCOV & INF VIR A&B AG IA: CPT | Performed by: NURSE PRACTITIONER

## 2022-12-14 PROCEDURE — 99213 OFFICE O/P EST LOW 20 MIN: CPT | Performed by: NURSE PRACTITIONER

## 2022-12-14 RX ORDER — METHYLPREDNISOLONE 4 MG/1
TABLET ORAL
Qty: 1 EACH | Refills: 0 | Status: SHIPPED | OUTPATIENT
Start: 2022-12-14 | End: 2023-02-03

## 2022-12-14 RX ORDER — DOXYCYCLINE HYCLATE 100 MG/1
100 CAPSULE ORAL 2 TIMES DAILY
Qty: 14 CAPSULE | Refills: 0 | Status: SHIPPED | OUTPATIENT
Start: 2022-12-14 | End: 2022-12-21

## 2022-12-14 NOTE — TELEPHONE ENCOUNTER
Hub staff attempted to follow warm transfer process and was unsuccessful     Caller: Augustina Rodgers    Relationship to patient: Self    Best call back number: [phone]    Patient is needing: PLEASE CALL PATIENT TO RESCHEDULE 10/15/22 LAB APPOINTMENT.    PLEASE ADVISE.

## 2022-12-14 NOTE — PATIENT INSTRUCTIONS
Drink plenty of fluids-water preferably, eat a heart healthy diet, get plenty of sleep and do warm salt water gargles twice a day until feeling better.

## 2022-12-14 NOTE — TELEPHONE ENCOUNTER
Hub staff attempted to follow warm transfer process and was unsuccessful     Caller: Augustina Rodgers    Relationship to patient: Self    Best call back number: [phone]    Patient is needing: NEEDING TO RESCHEDULED LAB APPOINTMENT ON 12/15/22 PLEASE CALL AND RESCHEDULED

## 2022-12-14 NOTE — PROGRESS NOTES
Subjective  Answers for HPI/ROS submitted by the patient on 2022  What is the primary reason for your visit?: Felipe Rodgers is a 67 y.o.. female.     Rash  This is a new problem. Episode onset: 4 days. The problem has been gradually worsening since onset. Location: forehead. The rash is characterized by itchiness, pain and redness. It is unknown if there was an exposure to a precipitant. Associated symptoms include anorexia, congestion, facial edema, fatigue, a fever (101.8  and Tuesday), joint pain and rhinorrhea. Pertinent negatives include no cough, diarrhea, eye pain, shortness of breath, sore throat or vomiting.       The following portions of the patient's history were reviewed and updated as appropriate: allergies, current medications, past family history, past medical history, past social history, past surgical history and problem list.    Past Medical History:   Diagnosis Date   • Anxiety    • Asthma    • COVID-19 2021   • Depression    • Family history of colon cancer 2017    Father   • Heart murmur    • History of colon polyps     Dr. Robles   • Hyperplastic colon polyp    • Internal hemorrhoids    • Mitral valve regurgitation 2012    mild to moderate   • Panic disorder    • Posterior vitreous detachment of right eye 2016    Dr. Bev Wilder   • Psychiatric illness     Depression   • Sjogren's disease (HCC)    • Tricuspid regurgitation 2012    mild to moderate       Past Surgical History:   Procedure Laterality Date   • BREAST BIOPSY Right    • BREAST CYST ASPIRATION     •  SECTION     • COLONOSCOPY  2015    hyperplastic polyp   • COLONOSCOPY  2011    [facility]   • COLONOSCOPY  2003    inflamed tubulovillous polyp   [facility]   • COLONOSCOPY N/A 2017    Procedure: COLONOSCOPY TO CECUM ANT TERMINAL ILEUM WITH COLD BIOPSY POLYPECTOMIES;  Surgeon: Xavier Robles MD;  Location: [facility] ENDOSCOPY;  Service:    • COLONOSCOPY   "06/16/2017    Dr. Robles [facility] 3 polyps   • COLONOSCOPY N/A 06/07/2019    Procedure: COLONOSCOPY into cecum and  TI with cold biopsy polypectomies;  Surgeon: Xavier Robles MD;  Location: [facility] ENDOSCOPY;  Service: Gastroenterology   • FERTILITY SURGERY      laproscopic   • TONSILLECTOMY         Review of Systems   Constitutional: Positive for fatigue and fever (101.8 sunday and Tuesday).   HENT: Positive for congestion, postnasal drip and rhinorrhea. Negative for ear pain and sore throat.    Eyes: Negative for pain.   Respiratory: Negative for cough and shortness of breath.    Gastrointestinal: Positive for anorexia. Negative for diarrhea, nausea and vomiting.   Musculoskeletal: Positive for arthralgias and joint pain.   Skin: Positive for rash.   Neurological: Positive for headaches.       Allergies   Allergen Reactions   • Ji-1 [Lidocaine] Other (See Comments)     Elevates heart rate   • Cephalosporins Other (See Comments)     Elevates heart rate   • Procaine Palpitations       Objective     Vitals:    12/14/22 0809   BP: 152/80   Pulse: 110   Resp: 18   Temp: 98.5 °F (36.9 °C)   TempSrc: Oral   SpO2: 95%   Height: 157.5 cm (62.01\")     Body mass index is 31.38 kg/m².    Physical Exam  Vitals reviewed.   Constitutional:       Appearance: She is well-developed.   HENT:      Head: Normocephalic and atraumatic.      Right Ear: A middle ear effusion is present. Tympanic membrane is not erythematous.      Left Ear: A middle ear effusion is present. Tympanic membrane is not erythematous.      Ears:      Comments: Right external ear swelling and redness     Nose: Congestion present.      Mouth/Throat:      Mouth: Mucous membranes are moist.      Pharynx: Oropharyngeal exudate (clear pnd) and posterior oropharyngeal erythema (minor) present. No pharyngeal swelling.   Eyes:      Conjunctiva/sclera: Conjunctivae normal.      Pupils: Pupils are equal, round, and reactive to light.      Comments: Rylan. Upper eye lid swelling " and redness noted   Cardiovascular:      Rate and Rhythm: Normal rate and regular rhythm.      Heart sounds: No murmur heard.  Pulmonary:      Effort: Pulmonary effort is normal.      Breath sounds: No wheezing, rhonchi or rales.   Musculoskeletal:      Cervical back: Edema (left side, minor) present. No erythema, signs of trauma, rigidity, torticollis or crepitus. Pain with movement and muscular tenderness present. No spinous process tenderness. Decreased range of motion.   Lymphadenopathy:      Cervical: No cervical adenopathy.   Skin:     General: Skin is warm and dry.      Comments: Forehead with macular red rash, no papules noted, no vesicles noted, no discharge noted, no warmth noted, tender to palpation   Neurological:      Mental Status: She is alert and oriented to person, place, and time.           Current Outpatient Medications:   •  ALPRAZolam (Xanax) 0.25 MG tablet, Take 1 tablet by mouth 2 (Two) Times a Day As Needed for Anxiety., Disp: 40 tablet, Rfl: 1  •  Calcium-Magnesium-Vitamin D (CALCIUM 1200+D3 PO), , Disp: , Rfl:   •  Cholecalciferol (Vitamin D) 50 MCG (2000 UT) capsule, , Disp: , Rfl:   •  escitalopram (LEXAPRO) 10 MG tablet, TAKE 1 TABLET BY MOUTH DAILY, Disp: 90 tablet, Rfl: 1  •  Multiple Vitamins-Minerals (MULTIVITAMIN ADULT PO), Take  by mouth., Disp: , Rfl:   •  omeprazole OTC (PriLOSEC OTC) 20 MG EC tablet, , Disp: , Rfl:   •  doxycycline (VIBRAMYCIN) 100 MG capsule, Take 1 capsule by mouth 2 (Two) Times a Day for 7 days., Disp: 14 capsule, Rfl: 0  •  methylPREDNISolone (MEDROL) 4 MG dose pack, Take as directed on package instructions., Disp: 1 each, Rfl: 0    Recent Results (from the past 2016 hour(s))   Covid-19 + Flu A&B AG, Veritor    Collection Time: 12/14/22  8:26 AM    Specimen: Swab   Result Value Ref Range    COVID19 Not Detected Not Detected - Ref. Range    Influenza A Antigen CRISTINE Not Detected Not Detected    Influenza B Antigen CRISTINE Not Detected Not Detected    Internal  Control Passed Passed    Lot Number [id number]     Expiration Date 11/12/2023          Diagnoses and all orders for this visit:    1. Acute frontal sinusitis, recurrence not specified (Primary)  -     methylPREDNISolone (MEDROL) 4 MG dose pack; Take as directed on package instructions.  Dispense: 1 each; Refill: 0  -     doxycycline (VIBRAMYCIN) 100 MG capsule; Take 1 capsule by mouth 2 (Two) Times a Day for 7 days.  Dispense: 14 capsule; Refill: 0    2. Periorbital cellulitis, unspecified laterality    3. Strain of neck muscle, initial encounter    4. Fever, unspecified fever cause  -     Covid-19 + Flu A&B AG, Veritor    5. Rash  -     Covid-19 + Flu A&B AG, Veritor    6. Soft tissue swelling        Patient Instructions   Drink plenty of fluids-water preferably, eat a heart healthy diet, get plenty of sleep and do warm salt water gargles twice a day until feeling better.       Return if symptoms worsen or fail to improve, for Dr. Tavares as needed/as recommended.

## 2022-12-14 NOTE — TELEPHONE ENCOUNTER
CALLED PT AND RESCHEDULED FASTING LABS ALSO RESCHEDULED AWV WITH PCP PER RESULTS WOULD NOT BE BACK IN TIME IF NOT.

## 2022-12-15 ENCOUNTER — TELEPHONE (OUTPATIENT)
Dept: FAMILY MEDICINE CLINIC | Facility: CLINIC | Age: 67
End: 2022-12-15

## 2022-12-15 NOTE — TELEPHONE ENCOUNTER
Caller: Augustina Rodgers     Relationship: SELF     Best call back number:[phone]     What is your medical concern? FACE LESS SWOLLEN BUT NECK MORE SWOLLEN     How long has this issue been going on? SINCE YESTERDAY     Is your provider already aware of this issue? YES     Have you been treated for this issue? YES

## 2022-12-20 LAB
ALBUMIN SERPL-MCNC: 4.2 G/DL (ref 3.5–5.2)
ALBUMIN/GLOB SERPL: 1 G/DL
ALP SERPL-CCNC: 140 U/L (ref 39–117)
ALT SERPL-CCNC: 47 U/L (ref 1–33)
APPEARANCE UR: CLEAR
AST SERPL-CCNC: 18 U/L (ref 1–32)
BACTERIA #/AREA URNS HPF: NORMAL /HPF
BASOPHILS # BLD AUTO: 0.08 10*3/MM3 (ref 0–0.2)
BASOPHILS NFR BLD AUTO: 0.6 % (ref 0–1.5)
BILIRUB SERPL-MCNC: 0.3 MG/DL (ref 0–1.2)
BILIRUB UR QL STRIP: NEGATIVE
BUN SERPL-MCNC: 23 MG/DL (ref 8–23)
BUN/CREAT SERPL: 27.1 (ref 7–25)
CALCIUM SERPL-MCNC: 9.7 MG/DL (ref 8.6–10.5)
CASTS URNS MICRO: NORMAL
CHLORIDE SERPL-SCNC: 96 MMOL/L (ref 98–107)
CHOLEST SERPL-MCNC: 184 MG/DL (ref 0–200)
CO2 SERPL-SCNC: 29.6 MMOL/L (ref 22–29)
COLOR UR: YELLOW
CREAT SERPL-MCNC: 0.85 MG/DL (ref 0.57–1)
EGFRCR SERPLBLD CKD-EPI 2021: 75.2 ML/MIN/1.73
EOSINOPHIL # BLD AUTO: 0.11 10*3/MM3 (ref 0–0.4)
EOSINOPHIL NFR BLD AUTO: 0.8 % (ref 0.3–6.2)
EPI CELLS #/AREA URNS HPF: NORMAL /HPF
ERYTHROCYTE [DISTWIDTH] IN BLOOD BY AUTOMATED COUNT: 11.8 % (ref 12.3–15.4)
GLOBULIN SER CALC-MCNC: 4.4 GM/DL
GLUCOSE SERPL-MCNC: 98 MG/DL (ref 65–99)
GLUCOSE UR QL STRIP: NEGATIVE
HBA1C MFR BLD: 5.7 % (ref 4.8–5.6)
HCT VFR BLD AUTO: 37.3 % (ref 34–46.6)
HDLC SERPL-MCNC: 33 MG/DL (ref 40–60)
HGB BLD-MCNC: 12.4 G/DL (ref 12–15.9)
HGB UR QL STRIP: NEGATIVE
IMM GRANULOCYTES # BLD AUTO: 0.65 10*3/MM3 (ref 0–0.05)
IMM GRANULOCYTES NFR BLD AUTO: 4.9 % (ref 0–0.5)
KETONES UR QL STRIP: NEGATIVE
LDLC SERPL CALC-MCNC: 132 MG/DL (ref 0–100)
LDLC/HDLC SERPL: 3.94 {RATIO}
LEUKOCYTE ESTERASE UR QL STRIP: NEGATIVE
LYMPHOCYTES # BLD AUTO: 2.79 10*3/MM3 (ref 0.7–3.1)
LYMPHOCYTES NFR BLD AUTO: 20.9 % (ref 19.6–45.3)
MCH RBC QN AUTO: 32.4 PG (ref 26.6–33)
MCHC RBC AUTO-ENTMCNC: 33.2 G/DL (ref 31.5–35.7)
MCV RBC AUTO: 97.4 FL (ref 79–97)
MONOCYTES # BLD AUTO: 1.03 10*3/MM3 (ref 0.1–0.9)
MONOCYTES NFR BLD AUTO: 7.7 % (ref 5–12)
NEUTROPHILS # BLD AUTO: 8.69 10*3/MM3 (ref 1.7–7)
NEUTROPHILS NFR BLD AUTO: 65.1 % (ref 42.7–76)
NITRITE UR QL STRIP: NEGATIVE
NRBC BLD AUTO-RTO: 0.1 /100 WBC (ref 0–0.2)
PH UR STRIP: 7 [PH] (ref 5–8)
PLATELET # BLD AUTO: 429 10*3/MM3 (ref 140–450)
POTASSIUM SERPL-SCNC: 5.3 MMOL/L (ref 3.5–5.2)
PROT SERPL-MCNC: 8.6 G/DL (ref 6–8.5)
PROT UR QL STRIP: NEGATIVE
RBC # BLD AUTO: 3.83 10*6/MM3 (ref 3.77–5.28)
RBC #/AREA URNS HPF: NORMAL /HPF
SODIUM SERPL-SCNC: 135 MMOL/L (ref 136–145)
SP GR UR STRIP: 1.01 (ref 1–1.03)
TRIGL SERPL-MCNC: 105 MG/DL (ref 0–150)
TSH SERPL DL<=0.005 MIU/L-ACNC: 2.63 UIU/ML (ref 0.27–4.2)
UROBILINOGEN UR STRIP-MCNC: NORMAL MG/DL
VLDLC SERPL CALC-MCNC: 19 MG/DL (ref 5–40)
WBC # BLD AUTO: 13.35 10*3/MM3 (ref 3.4–10.8)
WBC #/AREA URNS HPF: NORMAL /HPF

## 2022-12-22 ENCOUNTER — OFFICE VISIT (OUTPATIENT)
Dept: FAMILY MEDICINE CLINIC | Facility: CLINIC | Age: 67
End: 2022-12-22
Payer: MEDICARE

## 2022-12-22 VITALS
HEART RATE: 85 BPM | TEMPERATURE: 98.7 F | SYSTOLIC BLOOD PRESSURE: 124 MMHG | OXYGEN SATURATION: 98 % | WEIGHT: 174 LBS | DIASTOLIC BLOOD PRESSURE: 74 MMHG | BODY MASS INDEX: 32.02 KG/M2 | HEIGHT: 62 IN

## 2022-12-22 DIAGNOSIS — S16.1XXA ACUTE STRAIN OF NECK MUSCLE, INITIAL ENCOUNTER: ICD-10-CM

## 2022-12-22 DIAGNOSIS — F33.42 RECURRENT MAJOR DEPRESSION IN COMPLETE REMISSION: Chronic | ICD-10-CM

## 2022-12-22 DIAGNOSIS — Z00.00 MEDICARE ANNUAL WELLNESS VISIT, SUBSEQUENT: Primary | ICD-10-CM

## 2022-12-22 DIAGNOSIS — R73.9 HYPERGLYCEMIA: ICD-10-CM

## 2022-12-22 DIAGNOSIS — E66.3 OVERWEIGHT (BMI 25.0-29.9): ICD-10-CM

## 2022-12-22 PROCEDURE — G0439 PPPS, SUBSEQ VISIT: HCPCS | Performed by: INTERNAL MEDICINE

## 2022-12-22 PROCEDURE — 1170F FXNL STATUS ASSESSED: CPT | Performed by: INTERNAL MEDICINE

## 2022-12-22 PROCEDURE — 99213 OFFICE O/P EST LOW 20 MIN: CPT | Performed by: INTERNAL MEDICINE

## 2022-12-22 PROCEDURE — 1159F MED LIST DOCD IN RCRD: CPT | Performed by: INTERNAL MEDICINE

## 2022-12-22 PROCEDURE — 96160 PT-FOCUSED HLTH RISK ASSMT: CPT | Performed by: INTERNAL MEDICINE

## 2022-12-22 NOTE — PROGRESS NOTES
The ABCs of the Annual Wellness Visit  Subsequent Medicare Wellness Visit    Subjective    Augustina Rodgers is a 67 y.o. female who presents for a Subsequent Medicare Wellness Visit.    The following portions of the patient's history were reviewed and   updated as appropriate: allergies, current medications, past family history, past medical history, past social history, past surgical history and problem list.    Compared to one year ago, the patient feels her physical   health is the same.    Compared to one year ago, the patient feels her mental   health is the same.    Recent Hospitalizations:  She was not admitted to the hospital during the last year.       Current Medical Providers:  Patient Care Team:  Dmitri Tavares MD as PCP - General (Internal Medicine)  Sean Camacho MD (Inactive) as Consulting Physician (Obstetrics and Gynecology)  Xavier Robles MD as Consulting Physician (Gastroenterology)  Bev Wilder MD as Consulting Physician (Ophthalmology)    Outpatient Medications Prior to Visit   Medication Sig Dispense Refill   • Calcium-Magnesium-Vitamin D (CALCIUM 1200+D3 PO)      • Cholecalciferol (Vitamin D) 50 MCG (2000 UT) capsule      • escitalopram (LEXAPRO) 10 MG tablet TAKE 1 TABLET BY MOUTH DAILY 90 tablet 1   • Multiple Vitamins-Minerals (MULTIVITAMIN ADULT PO) Take  by mouth.     • omeprazole OTC (PriLOSEC OTC) 20 MG EC tablet      • ALPRAZolam (Xanax) 0.25 MG tablet Take 1 tablet by mouth 2 (Two) Times a Day As Needed for Anxiety. 40 tablet 1   • methylPREDNISolone (MEDROL) 4 MG dose pack Take as directed on package instructions. 1 each 0     No facility-administered medications prior to visit.       No opioid medication identified on active medication list. I have reviewed chart for other potential  high risk medication/s and harmful drug interactions in the elderly.          Aspirin is not on active medication list.  Aspirin use is not indicated based on review of current medical  condition/s. Risk of harm outweighs potential benefits.  .    Patient Active Problem List   Diagnosis   • History of colon polyps   • Family history of colon cancer   • Anxiety   • Recurrent major depression in complete remission (HCC)   • Colon polyp   • FH: colon cancer   • Overweight (BMI 25.0-29.9)   • Hyperglycemia   • Dry mouth   • Screening for lipid disorders   • Medicare annual wellness visit, subsequent   • COVID-19   • Cervical strain, acute     Advance Care Planning  Advance Directive is on file.  ACP discussion was held with the patient during this visit. Patient has an advance directive in EMR which is still valid.      Objective    Vitals:    12/22/22 1454   BP: 124/74   BP Location: Left arm   Patient Position: Sitting   Pulse: 85   Temp: 98.7 °F (37.1 °C)   SpO2: 98%   Weight: 78.9 kg (174 lb)   Height: 157.5 cm (62\")     Estimated body mass index is 31.83 kg/m² as calculated from the following:    Height as of this encounter: 157.5 cm (62\").    Weight as of this encounter: 78.9 kg (174 lb).    BMI is >= 30 and <35. (Class 1 Obesity). The following options were offered after discussion;: exercise counseling/recommendations and nutrition counseling/recommendations      Does the patient have evidence of cognitive impairment? No    Lab Results   Component Value Date    CHLPL 184 12/19/2022    TRIG 105 12/19/2022    HDL 33 (L) 12/19/2022     (H) 12/19/2022    VLDL 19 12/19/2022    HGBA1C 5.70 (H) 12/19/2022        HEALTH RISK ASSESSMENT    Smoking Status:  Social History     Tobacco Use   Smoking Status Never   Smokeless Tobacco Never     Alcohol Consumption:  Social History     Substance and Sexual Activity   Alcohol Use Yes    Comment: Occ old fashion     Fall Risk Screen:    STEADI Fall Risk Assessment has not been completed.    Depression Screening:  PHQ-2/PHQ-9 Depression Screening 12/22/2022   Retired PHQ-9 Total Score -   Retired Total Score -   Little Interest or Pleasure in Doing Things  0-->not at all   Feeling Down, Depressed or Hopeless 0-->not at all   Trouble Falling or Staying Asleep, or Sleeping Too Much -   Feeling Tired or Having Little Energy -   Poor Appetite or Overeating -   Feeling Bad about Yourself - or that You are a Failure or Have Let Yourself or Your Family Down -   Trouble Concentrating on Things, Such as Reading the Newspaper or Watching Television -   Moving or Speaking So Slowly that Other People Could Have Noticed? Or the Opposite - Being So Fidgety -   Thoughts that You Would be Better Off Dead or of Hurting Yourself in Some Way -   PHQ-9: Brief Depression Severity Measure Score 0   If You Checked Off Any Problems, How Difficult Have These Problems Made It For You to Do Your Work, Take Care of Things at Home, or Get Along with Other People? -       Health Habits and Functional and Cognitive Screening:  Functional & Cognitive Status 12/22/2022   Do you have difficulty preparing food and eating? No   Do you have difficulty bathing yourself, getting dressed or grooming yourself? No   Do you have difficulty using the toilet? No   Do you have difficulty moving around from place to place? No   Do you have trouble with steps or getting out of a bed or a chair? No   Current Diet Well Balanced Diet   Dental Exam Up to date   Eye Exam Up to date   Exercise (times per week) 4 times per week   Current Exercises Include Walking   Do you need help using the phone?  No   Are you deaf or do you have serious difficulty hearing?  No   Do you need help with transportation? No   Do you need help shopping? No   Do you need help preparing meals?  No   Do you need help with housework?  No   Do you need help with laundry? No   Do you need help taking your medications? No   Do you need help managing money? No   Do you ever drive or ride in a car without wearing a seat belt? No   Have you felt unusual stress, anger or loneliness in the last month? No   Who do you live with? Spouse   If you need  help, do you have trouble finding someone available to you? No   Have you been bothered in the last four weeks by sexual problems? (No Data)   Do you have difficulty concentrating, remembering or making decisions? No       Age-appropriate Screening Schedule:  Refer to the list below for future screening recommendations based on patient's age, sex and/or medical conditions. Orders for these recommended tests are listed in the plan section. The patient has been provided with a written plan.    Health Maintenance   Topic Date Due   • DXA SCAN  01/14/2023   • LIPID PANEL  12/19/2023   • MAMMOGRAM  11/02/2024   • PAP SMEAR  03/31/2025   • TDAP/TD VACCINES (3 - Td or Tdap) 09/23/2028   • INFLUENZA VACCINE  Completed   • ZOSTER VACCINE  Completed                CMS Preventative Services Quick Reference  Risk Factors Identified During Encounter  None Identified  The above risks/problems have been discussed with the patient.  Pertinent information has been shared with the patient in the After Visit Summary.  An After Visit Summary and PPPS were made available to the patient.    Follow Up:   Next Medicare Wellness visit to be scheduled in 1 year.       Additional E&M Note during same encounter follows:  Patient has multiple medical problems which are significant and separately identifiable that require additional work above and beyond the Medicare Wellness Visit.      Chief Complaint  Medicare Wellness-subsequent (Sore spot l neck)    Subjective        She is here for a Medicare subsequent annual wellness visit.    She complains of some cervical spine pain.    Otherwise she feels well.    Augustina Rodgers is also being seen today for cervical spine strain, depression, hyperglycemia, lab review.    Review of Systems   Constitutional: Negative.    HENT: Negative.    Respiratory: Negative.    Cardiovascular: Negative.    Musculoskeletal:        She complains of left-sided cervical spine pain.  Actually she indicates paraspinous  muscle pain.   Neurological: Negative.    Psychiatric/Behavioral: Negative.        Objective   Vital Signs:  /74 (BP Location: Left arm, Patient Position: Sitting)   Pulse 85   Temp 98.7 °F (37.1 °C)   Ht 157.5 cm (62\")   Wt 78.9 kg (174 lb)   SpO2 98%   BMI 31.83 kg/m²     Physical Exam  Vitals and nursing note reviewed.   Constitutional:       General: She is not in acute distress.     Appearance: Normal appearance. She is obese. She is not ill-appearing, toxic-appearing or diaphoretic.      Comments: Pleasant, neatly groomed, no distress.  BMI 31.  Height 62 inches.   Neck:      Vascular: No carotid bruit.   Cardiovascular:      Rate and Rhythm: Regular rhythm.      Heart sounds: Normal heart sounds. No murmur heard.    No gallop.   Pulmonary:      Effort: No respiratory distress.      Breath sounds: Normal breath sounds. No wheezing or rales.   Musculoskeletal:      Cervical back: Normal range of motion and neck supple. No rigidity or tenderness.   Lymphadenopathy:      Cervical: No cervical adenopathy.   Neurological:      Mental Status: She is alert and oriented to person, place, and time.   Psychiatric:         Mood and Affect: Mood normal.         Behavior: Behavior normal.         Thought Content: Thought content normal.             Assessment and Plan   Diagnoses and all orders for this visit:    1. Medicare annual wellness visit, subsequent (Primary)    2. Overweight (BMI 25.0-29.9)    3. Hyperglycemia    4. Recurrent major depression in complete remission (HCC)    5. Acute strain of neck muscle, initial encounter      She feels her depression is well controlled on S-Citalopram.    She is overweight and she and I discussed the need for weight loss via regular aerobic activity per American Heart Association guidelines and decrease caloric intake.    She has hyperglycemia.  We will monitor this and again weight loss will has discussed has an approach to controlling this.    She has acute strain  of her cervical spine.  I feel that this is muscular.  She may apply heating pad, Tylenol or ibuprofen as needed for pain and let me know if this fails to improve.  In that case, I would probably refer her to physical therapy.    Follow-up 6 months.       Follow Up   No follow-ups on file.  Patient was given instructions and counseling regarding her condition or for health maintenance advice. Please see specific information pulled into the AVS if appropriate.

## 2023-01-02 ENCOUNTER — PREP FOR SURGERY (OUTPATIENT)
Dept: OTHER | Facility: HOSPITAL | Age: 68
End: 2023-01-02
Payer: MEDICARE

## 2023-01-02 DIAGNOSIS — K63.5 COLON POLYP: Primary | ICD-10-CM

## 2023-01-02 DIAGNOSIS — Z80.0 FAMILY HISTORY OF COLON CANCER: ICD-10-CM

## 2023-01-09 PROBLEM — S16.1XXA CERVICAL STRAIN, ACUTE: Status: ACTIVE | Noted: 2023-01-09

## 2023-02-03 ENCOUNTER — OFFICE VISIT (OUTPATIENT)
Dept: OBSTETRICS AND GYNECOLOGY | Age: 68
End: 2023-02-03
Payer: MEDICARE

## 2023-02-03 VITALS
DIASTOLIC BLOOD PRESSURE: 70 MMHG | WEIGHT: 179 LBS | SYSTOLIC BLOOD PRESSURE: 116 MMHG | BODY MASS INDEX: 32.94 KG/M2 | HEIGHT: 62 IN

## 2023-02-03 DIAGNOSIS — F41.9 ANXIETY: Chronic | ICD-10-CM

## 2023-02-03 DIAGNOSIS — N89.8 VAGINA ITCHING: Primary | ICD-10-CM

## 2023-02-03 DIAGNOSIS — F33.42 RECURRENT MAJOR DEPRESSION IN COMPLETE REMISSION: Chronic | ICD-10-CM

## 2023-02-03 PROCEDURE — 99213 OFFICE O/P EST LOW 20 MIN: CPT | Performed by: OBSTETRICS & GYNECOLOGY

## 2023-02-03 RX ORDER — ESCITALOPRAM OXALATE 10 MG/1
10 TABLET ORAL DAILY
Qty: 90 TABLET | Refills: 1 | Status: SHIPPED | OUTPATIENT
Start: 2023-02-03

## 2023-02-03 NOTE — PROGRESS NOTES
"  Chief complaint-recent yeast infection    History of present illness- Patient is a 67 y.o.  who had a sinus infection a couple of months ago and was on antibiotics.  She noticed that she had vaginal itching and discharge.  She used external Lotrisone cream that seemed to help but then all the symptoms came back.  She retry the Lotrisone which helped.  Currently the patient is not experiencing any itching or discharge.  She wanted to come in to be checked just to be sure.  She normally does not take a lot of antibiotics.  She notices no odor to the discharge and really has just scant discharge.          /70   Ht 157.5 cm (62\")   Wt 81.2 kg (179 lb)   LMP  (LMP Unknown)   BMI 32.74 kg/m²   Physical Exam  Constitutional:       General: She is not in acute distress.     Appearance: Normal appearance. She is not ill-appearing.   Genitourinary:     Comments: External genitalia appear normal without lesions erythema or discharge  Sterile speculum exam vagina appears normal color with no discharge.  Swab is collected for yeast.  Neurological:      Mental Status: She is alert.             Diagnoses and all orders for this visit:    1. Vagina itching (Primary)  -     Candida panel, PCR - Swab, Vagina    Patient symptoms have actually resolved likely because she used the Lotrisone.  Encouraged the patient to call if she has to take antibiotics in the future to do Diflucan or Monistat.  Recommend internal placement of creams for 3 to 7 days.  We will check swab to make sure that it is gone.  "

## 2023-02-03 NOTE — TELEPHONE ENCOUNTER
Rx Refill Note  Requested Prescriptions      No prescriptions requested or ordered in this encounter      Last office visit with prescribing clinician: Visit date not found   Next office visit with prescribing clinician: Visit date not found    Progress Notes by Charles Flores PA-C (06/07/2022 09:45)    Med check 3-21-23    {  Mulu Del Cid MA  02/03/23, 08:36 EST

## 2023-02-06 LAB
C ALBICANS DNA VAG QL NAA+PROBE: NEGATIVE
C GLABRATA DNA VAG QL NAA+PROBE: NEGATIVE
C KRUSEI DNA VAG QL NAA+PROBE: NEGATIVE
C LUSITANIAE DNA VAG QL NAA+PROBE: NEGATIVE
CANDIDA DNA VAG QL NAA+PROBE: NEGATIVE

## 2023-02-13 ENCOUNTER — TELEPHONE (OUTPATIENT)
Dept: GASTROENTEROLOGY | Facility: CLINIC | Age: 68
End: 2023-02-13
Payer: MEDICARE

## 2023-02-13 NOTE — TELEPHONE ENCOUNTER
OK FOR HUB TO READ  KIRILL patient via telephone for COLONOSCOPY. Scheduled 4/13/23 with arrival time of 300PM. Prep paperwork mailed to verified address on file. Patient advised arrival time may change based on Located within [facility] guidelines. KIRILL ALVAREZ

## 2023-03-21 ENCOUNTER — OFFICE VISIT (OUTPATIENT)
Dept: PSYCHIATRY | Facility: CLINIC | Age: 68
End: 2023-03-21
Payer: MEDICARE

## 2023-03-21 DIAGNOSIS — F41.1 GENERALIZED ANXIETY DISORDER: Chronic | ICD-10-CM

## 2023-03-21 DIAGNOSIS — F33.0 MILD EPISODE OF RECURRENT MAJOR DEPRESSIVE DISORDER: Primary | Chronic | ICD-10-CM

## 2023-03-21 PROCEDURE — 1160F RVW MEDS BY RX/DR IN RCRD: CPT

## 2023-03-21 PROCEDURE — 1159F MED LIST DOCD IN RCRD: CPT

## 2023-03-21 PROCEDURE — 99214 OFFICE O/P EST MOD 30 MIN: CPT

## 2023-03-21 NOTE — PROGRESS NOTES
Subjective   Augustina Rodgers is a 67 y.o. female who presents today for follow-up for psychiatric medication management. Patient is new to this provider, but previously saw JERROD Mendiola.     Chief Complaint:  Depression, anxiety     History of Present Illness:     Patient last saw MANINDER Flores on 6/7/22. Taking Lexapro 10mg.    Doing ok on Lexapro 10mg.   Depression is good. No suicidal thoughts. Retired from nursing.   Taking alprazolam infrequently. It is prescribed by another provider.   She had Covid last year, as well as her . He was really sick and this was hard on her. She feels like that is when she fell into depression.   Since being back on Lexapro, she has felt well.   No SI/HI/AVH.   Overall doing well and wants to keep medications the same.       Patient presents with symptoms and behaviors that are consistent with the following DSM-5 diagnoses:  1. Major depressive disorder  2. Generalized anxiety     The following portions of the patient's history were reviewed and updated as appropriate: allergies, current medications, past family history, past medical history, past social history, past surgical history and problem list.    PAST OUTPATIENT TREATMENT  Diagnosis treated:  Affective Disorder, Anxiety/Panic Disorder  Treatment Type:  Medication Management  Prior Psychiatric Medications:  Pamelor-effective.  Lexapro - effective.  Xanax - effective at half tab for acute anxiety.  Support Groups:  None  Sequelae Of Mental Disorder:  emotional distress    Interval History  Improved    Side Effects  None      Past Medical History:  Past Medical History:   Diagnosis Date   • Anxiety    • Asthma    • COVID-19 12/2021   • Depression    • Family history of colon cancer 02/08/2017    Father   • Heart murmur    • History of colon polyps     Dr. Robles   • Hyperplastic colon polyp    • Internal hemorrhoids    • Mitral valve regurgitation 04/03/2012    mild to moderate   • Panic disorder 1988   •  Posterior vitreous detachment of right eye 2016    Dr. Bev Wilder   • Psychiatric illness     Depression   • Sjogren's disease (HCC)    • Tricuspid regurgitation 2012    mild to moderate       Social History:  Social History     Socioeconomic History   • Marital status:    Tobacco Use   • Smoking status: Never   • Smokeless tobacco: Never   Vaping Use   • Vaping Use: Never used   Substance and Sexual Activity   • Alcohol use: Yes     Comment: Occ old fashion   • Drug use: No   • Sexual activity: Not Currently     Partners: Male     Birth control/protection: Post-menopausal       Family History:  Family History   Problem Relation Age of Onset   • Alzheimer's disease Mother    • Hypertension Mother    • COPD Mother    • Colon cancer Father 61   • Hypertension Father    • Anxiety disorder Father    • Depression Father    • Colon polyps Brother    • Breast cancer Paternal Grandmother 80       Past Surgical History:  Past Surgical History:   Procedure Laterality Date   • BREAST BIOPSY Right    • BREAST CYST ASPIRATION     •  SECTION     • COLONOSCOPY  2015    hyperplastic polyp   • COLONOSCOPY  2011    [facility]   • COLONOSCOPY  2003    inflamed tubulovillous polyp   [facility]ALA   • COLONOSCOPY N/A 2017    Procedure: COLONOSCOPY TO CECUM ANT TERMINAL ILEUM WITH COLD BIOPSY POLYPECTOMIES;  Surgeon: Xavier Robles MD;  Location: [facility];  Service:    • COLONOSCOPY  2017    Dr. Robles [facility] 3 polyps   • COLONOSCOPY N/A 2019    Procedure: COLONOSCOPY into cecum and  TI with cold biopsy polypectomies;  Surgeon: Xavier Robles MD;  Location: [facility];  Service: Gastroenterology   • FERTILITY SURGERY      laproscopic   • TONSILLECTOMY         Problem List:  Patient Active Problem List   Diagnosis   • History of colon polyps   • Family history of colon cancer   • Anxiety   • Recurrent major depression in complete remission (HCC)   • Colon polyp   • FH: colon  cancer   • Overweight (BMI 25.0-29.9)   • Hyperglycemia   • Dry mouth   • Screening for lipid disorders   • Medicare annual wellness visit, subsequent   • COVID-19   • Cervical strain, acute       Allergy:   Allergies   Allergen Reactions   • Ji-1 [Lidocaine] Other (See Comments)     Elevates heart rate   • Cephalosporins Other (See Comments)     Elevates heart rate   • Procaine Palpitations        Discontinued Medications:  There are no discontinued medications.    Current Medications:   Current Outpatient Medications   Medication Sig Dispense Refill   • ALPRAZolam (Xanax) 0.25 MG tablet Take 1 tablet by mouth 2 (Two) Times a Day As Needed for Anxiety. 40 tablet 1   • Calcium-Magnesium-Vitamin D (CALCIUM 1200+D3 PO)      • Cholecalciferol (Vitamin D) 50 MCG (2000 UT) capsule      • escitalopram (LEXAPRO) 10 MG tablet Take 1 tablet by mouth Daily. 90 tablet 1   • Multiple Vitamins-Minerals (MULTIVITAMIN ADULT PO) Take  by mouth.     • omeprazole OTC (PriLOSEC OTC) 20 MG EC tablet        No current facility-administered medications for this visit.         Psychological ROS: positive for - depression  negative for - behavioral disorder, concentration difficulties, decreased libido, disorientation, hallucinations, hostility, irritability, memory difficulties, mood swings, obsessive thoughts, physical abuse, sexual abuse, sleep disturbances or suicidal ideation      Physical Exam:   not currently breastfeeding.    Mental Status Exam:   Hygiene:   good  Cooperation:  Cooperative  Eye Contact:  Good  Psychomotor Behavior:  Appropriate  Affect:  Appropriate  Mood: euthymic  Hopelessness: Denies  Speech:  Normal  Thought Process:  Goal directed and Linear  Thought Content:  Normal  Suicidal:  None  Homicidal:  None  Hallucinations:  None  Delusion:  None  Memory:  Intact  Orientation:  Person, Place, Time and Situation  Reliability:  good  Insight:  Good  Judgement:  Good  Impulse Control:  Good  Physical/Medical Issues:   No        PHQ-9 Depression Screening    Little interest or pleasure in doing things? 0-->not at all   Feeling down, depressed, or hopeless? 0-->not at all   Trouble falling or staying asleep, or sleeping too much? 0-->not at all   Feeling tired or having little energy? 0-->not at all   Poor appetite or overeating? 0-->not at all   Feeling bad about yourself - or that you are a failure or have let yourself or your family down? 0-->not at all   Trouble concentrating on things, such as reading the newspaper or watching television? 0-->not at all   Moving or speaking so slowly that other people could have noticed? Or the opposite - being so fidgety or restless that you have been moving around a lot more than usual? 0-->not at all   Thoughts that you would be better off dead, or of hurting yourself in some way? 0-->not at all   PHQ-9 Total Score 0   If you checked off any problems, how difficult have these problems made it for you to do your work, take care of things at home, or get along with other people? not difficult at all        Never smoker    I advised Augustina of the risks of tobacco use.     Result Review:    Labs:  Office Visit on 02/03/2023   Component Date Value Ref Range Status   • Nidhi Albicans, AYLA 02/03/2023 Negative  Negative Final   • Nidhi Glabrata, AYLA 02/03/2023 Negative  Negative Final   • C PARAPSILOSIS/TROPICALIS 02/03/2023 Negative  Negative Final    This assay does not differentiate C. tropicalis and C. parapsilosis.   • Candida lusitaniae, AYLA 02/03/2023 Negative  Negative Final   • Nidhi krusei, AYLA 02/03/2023 Negative  Negative Final       Assessment & Plan   Diagnoses and all orders for this visit:    1. Mild episode of recurrent major depressive disorder (HCC) (Primary)    2. Generalized anxiety disorder    Continue Lexapro 10mg daily.      Visit Diagnoses:    ICD-10-CM ICD-9-CM   1. Mild episode of recurrent major depressive disorder (HCC)  F33.0 296.31   2. Generalized anxiety  disorder  F41.1 300.02       TREATMENT PLAN/GOALS: Continue supportive psychotherapy efforts and medications as indicated. Treatment and medication options discussed during today's visit. Patient ackowledged and verbally consented to continue with current treatment plan and was educated on the importance of compliance with treatment and follow-up appointments.    MEDICATION ISSUES:  INSPECT reviewed as expected    Discussed medication options and treatment plan of prescribed medication as well as the risks, benefits, and side effects including potential falls, possible impaired driving and metabolic adversities among others. Patient is agreeable to call the office with any worsening of symptoms or onset of side effects. Patient is agreeable to call 911 or go to the nearest ER should he/she begin having SI/HI. No medication side effects or related complaints today.     MEDS ORDERED DURING VISIT:  No orders of the defined types were placed in this encounter.      Return in about 6 months (around 9/21/2023).         This document has been electronically signed by PRICILLA Aragon  March 21, 2023 09:04 EDT    Part of this note may be an electronic transcription/translation of spoken language to printed text using the Dragon Dictation System.    Answers for HPI/ROS submitted by the patient on 3/14/2023  Please describe your symptoms.: History of depression, anxiety, at present doing ok, have been on lexapro a year.  Have you had these symptoms before?: Yes  How long have you been having these symptoms?: Greater than 2 weeks  Please list any medications you are currently taking for this condition.: Lexapro 10mg daily, Zanax .25 mg as needed for anxiety  Please describe any probable cause for these symptoms. : Stress, anxiety  What is the primary reason for your visit?: Other

## 2023-04-13 ENCOUNTER — HOSPITAL ENCOUNTER (OUTPATIENT)
Facility: HOSPITAL | Age: 68
Setting detail: HOSPITAL OUTPATIENT SURGERY
Discharge: HOME | End: 2023-04-13
Attending: INTERNAL MEDICINE | Admitting: INTERNAL MEDICINE
Payer: MEDICARE

## 2023-04-13 ENCOUNTER — ANESTHESIA EVENT (OUTPATIENT)
Dept: GASTROENTEROLOGY | Facility: HOSPITAL | Age: 68
End: 2023-04-13
Payer: MEDICARE

## 2023-04-13 ENCOUNTER — ANESTHESIA (OUTPATIENT)
Dept: GASTROENTEROLOGY | Facility: HOSPITAL | Age: 68
End: 2023-04-13
Payer: MEDICARE

## 2023-04-13 VITALS
OXYGEN SATURATION: 100 % | SYSTOLIC BLOOD PRESSURE: 140 MMHG | DIASTOLIC BLOOD PRESSURE: 75 MMHG | BODY MASS INDEX: 32.25 KG/M2 | WEIGHT: 182 LBS | RESPIRATION RATE: 17 BRPM | HEIGHT: 63 IN | HEART RATE: 70 BPM

## 2023-04-13 DIAGNOSIS — K63.5 COLON POLYP: ICD-10-CM

## 2023-04-13 DIAGNOSIS — Z80.0 FAMILY HISTORY OF COLON CANCER: ICD-10-CM

## 2023-04-13 PROCEDURE — 45380 COLONOSCOPY AND BIOPSY: CPT | Performed by: INTERNAL MEDICINE

## 2023-04-13 PROCEDURE — 88305 TISSUE EXAM BY PATHOLOGIST: CPT | Performed by: INTERNAL MEDICINE

## 2023-04-13 PROCEDURE — 25010000002 PROPOFOL 200 MG/20ML EMULSION: Performed by: ANESTHESIOLOGY

## 2023-04-13 PROCEDURE — 25010000002 PROPOFOL 10 MG/ML EMULSION: Performed by: ANESTHESIOLOGY

## 2023-04-13 RX ORDER — SODIUM CHLORIDE 0.9 % (FLUSH) 0.9 %
10 SYRINGE (ML) INJECTION AS NEEDED
Status: DISCONTINUED | OUTPATIENT
Start: 2023-04-13 | End: 2023-04-13 | Stop reason: HOSPADM

## 2023-04-13 RX ORDER — SODIUM CHLORIDE, SODIUM LACTATE, POTASSIUM CHLORIDE, CALCIUM CHLORIDE 600; 310; 30; 20 MG/100ML; MG/100ML; MG/100ML; MG/100ML
30 INJECTION, SOLUTION INTRAVENOUS CONTINUOUS PRN
Status: DISCONTINUED | OUTPATIENT
Start: 2023-04-13 | End: 2023-04-13 | Stop reason: HOSPADM

## 2023-04-13 RX ORDER — PROPOFOL 10 MG/ML
INJECTION, EMULSION INTRAVENOUS AS NEEDED
Status: DISCONTINUED | OUTPATIENT
Start: 2023-04-13 | End: 2023-04-13 | Stop reason: SURG

## 2023-04-13 RX ORDER — SODIUM CHLORIDE 0.9 % (FLUSH) 0.9 %
10 SYRINGE (ML) INJECTION EVERY 12 HOURS SCHEDULED
Status: DISCONTINUED | OUTPATIENT
Start: 2023-04-13 | End: 2023-04-13 | Stop reason: HOSPADM

## 2023-04-13 RX ORDER — SODIUM CHLORIDE 9 MG/ML
40 INJECTION, SOLUTION INTRAVENOUS AS NEEDED
Status: DISCONTINUED | OUTPATIENT
Start: 2023-04-13 | End: 2023-04-13 | Stop reason: HOSPADM

## 2023-04-13 RX ADMIN — PROPOFOL 140 MG: 10 INJECTION, EMULSION INTRAVENOUS at 16:01

## 2023-04-13 RX ADMIN — SODIUM CHLORIDE, POTASSIUM CHLORIDE, SODIUM LACTATE AND CALCIUM CHLORIDE: 600; 310; 30; 20 INJECTION, SOLUTION INTRAVENOUS at 15:54

## 2023-04-13 RX ADMIN — PROPOFOL 160 MCG/KG/MIN: 10 INJECTION, EMULSION INTRAVENOUS at 16:01

## 2023-04-13 NOTE — H&P
[facility]  Pre Procedure History & Physical    Chief Complaint:   Time for my colonoscopy    Subjective     HPI:   68 y.o. female who has a personal history of colon polyps.  In addition her dad had colon cancer and 3 brothers had colon polyps.  She last had a colonoscopy in June 2019.    Past Medical History:   Past Medical History:   Diagnosis Date   • Anxiety    • Asthma    • COVID-19 12/2021   • Depression    • Family history of colon cancer 02/08/2017    Father   • Heart murmur    • History of colon polyps     Dr. Robles   • Hyperplastic colon polyp    • Internal hemorrhoids    • Mitral valve regurgitation 04/03/2012    mild to moderate   • Panic disorder 1988   • Posterior vitreous detachment of right eye 09/2016    Dr. Bev Wilder   • Psychiatric illness 1988    Depression   • Sjogren's disease    • Tricuspid regurgitation 04/03/2012    mild to moderate       Family History:  Family History   Problem Relation Age of Onset   • Alzheimer's disease Mother    • Hypertension Mother    • COPD Mother    • Colon cancer Father 61   • Hypertension Father    • Anxiety disorder Father    • Depression Father    • Colon polyps Brother    • Breast cancer Paternal Grandmother 80       Social History:   reports that she has never smoked. She has never used smokeless tobacco. She reports current alcohol use. She reports that she does not use drugs.    Medications:   Medications Prior to Admission   Medication Sig Dispense Refill Last Dose   • Calcium-Magnesium-Vitamin D (CALCIUM 1200+D3 PO)    Past Week   • Cholecalciferol (Vitamin D) 50 MCG (2000 UT) capsule    Past Week   • escitalopram (LEXAPRO) 10 MG tablet Take 1 tablet by mouth Daily. 90 tablet 1 Past Week   • Multiple Vitamins-Minerals (MULTIVITAMIN ADULT PO) Take  by mouth.   Past Week   • omeprazole OTC (PriLOSEC OTC) 20 MG EC tablet    Past Week   • ALPRAZolam (Xanax) 0.25 MG tablet Take 1 tablet by mouth 2 (Two) Times a Day As Needed for  "Anxiety. 40 tablet 1 More than a month       Allergies:  Ji-1 [lidocaine], Cephalosporins, and Procaine    ROS:    Pertinent items are noted in HPI     Objective     Blood pressure 163/84, pulse 107, resp. rate 16, height 160 cm (63\"), weight 82.6 kg (182 lb), SpO2 96 %, not currently breastfeeding.    Physical Exam   Constitutional: Pt is oriented to person, place, and time and well-developed, well-nourished, and in no distress.   HENT:   Mouth/Throat: Oropharynx is clear and moist.   Neck: Normal range of motion. Neck supple.   Cardiovascular: Normal rate, regular rhythm and normal heart sounds.    Pulmonary/Chest: Effort normal and breath sounds normal. No respiratory distress. No  wheezes.   Abdominal: Soft. Bowel sounds are normal.   Skin: Skin is warm and dry.   Psychiatric: Mood, memory, affect and judgment normal.     Assessment & Plan     Diagnosis:  68 y.o. female who has a personal history of colon polyps.  In addition her dad had colon cancer and 3 brothers had colon polyps.  She last had a colonoscopy in June 2019.    Anticipated Surgical Procedure:  Colonoscopy    The risks, benefits, and alternatives of this procedure have been discussed with the patient or the responsible party- the patient understands and agrees to proceed.    Xavier Robles M.D.  "

## 2023-04-13 NOTE — ANESTHESIA POSTPROCEDURE EVALUATION
Patient: Augustina Rodgers    Procedure Summary     Date: 04/13/23 Room / Location:  [facility] /  [facility]    Anesthesia Start: 1553 Anesthesia Stop: 1622    Procedure: COLONOSCOPY to cecum into TI with cold biopsy polypectomies Diagnosis:       Colon polyp      Family history of colon cancer      (Colon polyp [K63.5])      (Family history of colon cancer [Z80.0])    Surgeons: Xavier Robles MD Provider: Ramirez Pang MD    Anesthesia Type: MAC ASA Status: 3          Anesthesia Type: MAC    Vitals  No vitals data found for the desired time range.          Post Anesthesia Care and Evaluation    Patient location during evaluation: bedside  Patient participation: complete - patient participated  Level of consciousness: responsive to light touch, responsive to verbal stimuli and sleepy but conscious  Pain score: 0  Pain management: adequate    Airway patency: patent  Anesthetic complications: No anesthetic complications  PONV Status: none  Cardiovascular status: acceptable and hemodynamically stable  Respiratory status: acceptable  Hydration status: acceptable

## 2023-04-13 NOTE — ANESTHESIA PREPROCEDURE EVALUATION
Anesthesia Evaluation     NPO Solid Status: > 8 hours  NPO Liquid Status: > 8 hours           Airway   Mallampati: I  TM distance: >3 FB  No difficulty expected  Dental      Pulmonary    Cardiovascular   Exercise tolerance: good (4-7 METS)        Neuro/Psych  (+) psychiatric history,    GI/Hepatic/Renal/Endo      Musculoskeletal     Abdominal    Substance History      OB/GYN          Other                        Anesthesia Plan    ASA 3     MAC     intravenous induction     Anesthetic plan, risks, benefits, and alternatives have been provided, discussed and informed consent has been obtained with: patient.        CODE STATUS:

## 2023-04-13 NOTE — DISCHARGE INSTRUCTIONS
For the next 24 hours patient needs to be with a responsible adult.    For 24 hours DO NOT drive, operate machinery, appliances, drink alcohol, make important decisions or sign legal documents.    Start with a light or bland diet if you are feeling sick to your stomach otherwise advance to regular diet as tolerated.    Follow recommendations on procedure report if provided by your doctor.    Call Dr Robles for problems [phone]    Problems may include but not limited to: large amounts of bleeding, trouble breathing, repeated vomiting, severe unrelieved pain, fever or chills.

## 2023-04-17 LAB
LAB AP CASE REPORT: NORMAL
PATH REPORT.FINAL DX SPEC: NORMAL
PATH REPORT.GROSS SPEC: NORMAL

## 2023-04-17 NOTE — PROGRESS NOTES
04/17/23       Tell her that the colon polyps that were removed were not cancerous but one was precancerous.  I would recommend that she have a repeat colonoscopy in 3 to 5 years.  Please send a copy of this report to her PCP.  Shreyas nunez

## 2023-04-27 ENCOUNTER — TELEPHONE (OUTPATIENT)
Dept: GASTROENTEROLOGY | Facility: CLINIC | Age: 68
End: 2023-04-27

## 2023-04-27 NOTE — TELEPHONE ENCOUNTER
----- Message from Xavier Robles MD sent at 4/17/2023 11:09 AM EDT -----  04/17/23       Tell her that the colon polyps that were removed were not cancerous but one was precancerous.  I would recommend that she have a repeat colonoscopy in 3 to 5 years.  Please send a copy of this report to her PCP.  Thx. kjh

## 2023-04-27 NOTE — TELEPHONE ENCOUNTER
Hub staff attempted to follow warm transfer process and was unsuccessful     Caller: Augustina Rodgers    Relationship to patient: Self    Best call back number: [phone] (Mobile)    Patient is needing: PT IS CALLING TO CHECK IF THE RESULTS FROM HER COLONOSCOPY ARE BACK YET. PLEASE CALL PT ASAP TO DISCUSS.

## 2023-04-27 NOTE — TELEPHONE ENCOUNTER
Call to pt.  Advise per DR Robles's note.  Verb understanding.      C/s for 4/13/26 is in recall and HM.      Update to DR Dmitri Tavares.

## 2023-06-12 ENCOUNTER — TRANSCRIBE ORDERS (OUTPATIENT)
Dept: ADMINISTRATIVE | Facility: HOSPITAL | Age: 68
End: 2023-06-12
Payer: MEDICARE

## 2023-06-12 DIAGNOSIS — Z13.6 ENCOUNTER FOR SCREENING FOR VASCULAR DISEASE: Primary | ICD-10-CM

## 2023-07-07 PROBLEM — M25.551 RIGHT HIP PAIN: Status: ACTIVE | Noted: 2023-07-07

## 2023-07-07 PROBLEM — E78.00 HYPERCHOLESTEROLEMIA: Status: ACTIVE | Noted: 2023-07-07

## 2023-08-01 ENCOUNTER — OFFICE VISIT (OUTPATIENT)
Dept: ORTHOPEDIC SURGERY | Facility: CLINIC | Age: 68
End: 2023-08-01
Payer: MEDICARE

## 2023-08-01 VITALS — BODY MASS INDEX: 34.72 KG/M2 | HEIGHT: 62 IN | TEMPERATURE: 97.3 F | WEIGHT: 188.7 LBS

## 2023-08-01 DIAGNOSIS — M70.71 BURSITIS OF OTHER BURSA OF RIGHT HIP: Primary | ICD-10-CM

## 2023-08-01 DIAGNOSIS — M70.61 TROCHANTERIC BURSITIS OF RIGHT HIP: Primary | ICD-10-CM

## 2023-08-01 RX ORDER — MELOXICAM 15 MG/1
15 TABLET ORAL DAILY
Qty: 30 TABLET | Refills: 3 | Status: SHIPPED | OUTPATIENT
Start: 2023-08-01

## 2023-08-03 ENCOUNTER — PATIENT ROUNDING (BHMG ONLY) (OUTPATIENT)
Dept: ORTHOPEDIC SURGERY | Facility: CLINIC | Age: 68
End: 2023-08-03
Payer: MEDICARE

## 2023-08-03 ENCOUNTER — HOSPITAL ENCOUNTER (OUTPATIENT)
Dept: PHYSICAL THERAPY | Facility: HOSPITAL | Age: 68
Setting detail: THERAPIES SERIES
Discharge: HOME | End: 2023-08-03
Payer: MEDICARE

## 2023-08-03 DIAGNOSIS — Z74.09 IMPAIRED MOBILITY: ICD-10-CM

## 2023-08-03 DIAGNOSIS — M25.551 RIGHT HIP PAIN: Primary | ICD-10-CM

## 2023-08-03 PROCEDURE — 97110 THERAPEUTIC EXERCISES: CPT | Performed by: PHYSICAL THERAPIST

## 2023-08-03 PROCEDURE — 97162 PT EVAL MOD COMPLEX 30 MIN: CPT | Performed by: PHYSICAL THERAPIST

## 2023-08-11 ENCOUNTER — HOSPITAL ENCOUNTER (OUTPATIENT)
Dept: PHYSICAL THERAPY | Facility: HOSPITAL | Age: 68
Setting detail: THERAPIES SERIES
Discharge: HOME | End: 2023-08-11
Payer: MEDICARE

## 2023-08-11 DIAGNOSIS — M25.551 RIGHT HIP PAIN: Primary | ICD-10-CM

## 2023-08-11 DIAGNOSIS — Z74.09 IMPAIRED MOBILITY: ICD-10-CM

## 2023-08-11 PROCEDURE — 97140 MANUAL THERAPY 1/> REGIONS: CPT

## 2023-08-11 PROCEDURE — 97110 THERAPEUTIC EXERCISES: CPT

## 2023-08-11 NOTE — THERAPY TREATMENT NOTE
Outpatient Physical Therapy Ortho Treatment Note  [facility]     Patient Name: Augustina Rodgers  : 1955  MRN: [id number]  Today's Date: 2023      Visit Date: 2023    Visit Dx:    ICD-10-CM ICD-9-CM   1. Right hip pain  M25.551 719.45   2. impaired mobility  Z74.09 799.89       Patient Active Problem List   Diagnosis    History of colon polyps    Family history of colon cancer    Anxiety    Recurrent major depression in complete remission    Colon polyp    FH: colon cancer    Overweight (BMI 25.0-29.9)    Hyperglycemia    Dry mouth    Screening for lipid disorders    Medicare annual wellness visit, subsequent    COVID-19    Cervical strain, acute    Right hip pain    Hypercholesterolemia        Past Medical History:   Diagnosis Date    Anxiety     Arthritis     Asthma     Cataract 2017    Colon polyp     COVID-19 2021    Depression     Family history of colon cancer 2017    Father    GERD (gastroesophageal reflux disease)     Heart murmur     History of colon polyps     Dr. Robles    History of medical problems      sjolgrins    Hyperplastic colon polyp     Internal hemorrhoids     Mitral valve regurgitation 2012    mild to moderate    Osteopenia     Panic disorder 1988    Posterior vitreous detachment of right eye 2016    Dr. Bev Wilder    Psychiatric illness 1988    Depression    Sjogren's disease     Tricuspid regurgitation 2012    mild to moderate        Past Surgical History:   Procedure Laterality Date    BREAST BIOPSY Right     BREAST CYST ASPIRATION       SECTION      COLONOSCOPY  2015    hyperplastic polyp    COLONOSCOPY  2011    [facility]    COLONOSCOPY  2003    inflamed tubulovillous polyp   [facility]    COLONOSCOPY N/A 2017    Procedure: COLONOSCOPY TO CECUM ANT TERMINAL ILEUM WITH COLD BIOPSY POLYPECTOMIES;  Surgeon: Xavier Robles MD;  Location: [facility] ENDOSCOPY;  Service:     COLONOSCOPY  2017      "[facility] 3 polyps    COLONOSCOPY N/A 06/07/2019    Procedure: COLONOSCOPY into cecum and  TI with cold biopsy polypectomies;  Surgeon: Xavier Robles MD;  Location:  [facility] ENDOSCOPY;  Service: Gastroenterology    COLONOSCOPY N/A 4/13/2023    Procedure: COLONOSCOPY to cecum into TI with cold biopsy polypectomies;  Surgeon: Xavier Robles MD;  Location: [facility] ENDOSCOPY;  Service: Gastroenterology;  Laterality: N/A;  pre- family hx colon cancer, personal hx colon polyps  post- polyps, diverticulosis, hemorrhoids    DILATATION AND CURETTAGE      at 50 y old    FERTILITY SURGERY      laproscopic    TONSILLECTOMY                          PT Assessment/Plan       Row Name 08/11/23 0922          PT Assessment    Assessment Comments Pt returns for first follow up since evaluation reporting low pain levels today. She enters with near normal gait pattern without AD device. We focused on core/hip stabilization exercises with good tolerance. She does report R hip pain with blue foam step up but it is intermittent in nature and resolves with minimal rest. Added STS, lateral walking and standing extension with kickstand with good tolerance. Cuing for core stabilization and equal WBing for STS. STM at end of session to relax irritated tissues targeted with strengthening exercises.  -LB        PT Plan    PT Plan Comments Consider 4\" step up, AR press, update HEP.  -LB               User Key  (r) = Recorded By, (t) = Taken By, (c) = Cosigned By      Initials Name Provider Type    LB Idalmis Tineo, PT Physical Therapist                       OP Exercises       Row Name 08/11/23 0900 08/11/23 0800          Subjective Comments    Subjective Comments -- I am doing pretty well.  -LB        Subjective Pain    Able to rate subjective pain? -- yes  -LB     Pre-Treatment Pain Level -- 0  -LB        Total Minutes    16735 - PT Therapeutic Exercise Minutes -- 35  -LB     45957 - PT Manual Therapy Minutes 10  -LB --        Exercise 1    Exercise " Name 1 -- Nustep  -LB     Time 1 -- 5 minutes  -LB     Additional Comments -- Level 4  -LB        Exercise 2    Exercise Name 2 -- PPT  -LB     Cueing 2 -- Verbal;Demo  -LB     Reps 2 -- 15  -LB     Time 2 -- 5s  -LB     Additional Comments -- + adduction  -LB        Exercise 3    Exercise Name 3 -- bridge with PPT  -LB     Cueing 3 -- Verbal;Demo  -LB     Reps 3 -- 15  -LB     Time 3 -- 3s  -LB        Exercise 4    Exercise Name 4 -- SL clam,B  -LB     Cueing 4 -- Verbal;Demo  -LB     Reps 4 -- 10  -LB     Time 4 -- 3s  -LB     Additional Comments -- RTB  -LB        Exercise 5    Exercise Name 5 -- piriformis stretch  -LB     Cueing 5 -- Verbal;Demo  -LB     Reps 5 -- 3  -LB     Time 5 -- 20s  -LB        Exercise 6    Exercise Name 6 -- seated HS stretch  -LB     Cueing 6 -- Verbal;Tactile;Demo  -LB     Reps 6 -- 3  -LB     Time 6 -- 20s  -LB        Exercise 7    Exercise Name 7 -- STS  -LB     Sets 7 -- 2  -LB     Reps 7 -- 10  -LB     Time 7 -- RTB at knees for 2nd set  -LB        Exercise 8    Exercise Name 8 -- standing HS curl  -LB     Sets 8 -- 2  -LB     Reps 8 -- 10  -LB     Time 8 -- 2#  -LB        Exercise 9    Exercise Name 9 -- lateral walking tband  -LB     Reps 9 -- 3 laps  -LB     Time 9 -- RTB  -LB        Exercise 10    Exercise Name 10 -- SLS + kickstand extxension  -LB     Sets 10 -- 2  -LB     Reps 10 -- 10  -LB     Time 10 -- RTB  -LB        Exercise 11    Exercise Name 11 -- step up + knee  blue foam  -LB     Sets 11 -- 2  -LB     Reps 11 -- 10  -LB               User Key  (r) = Recorded By, (t) = Taken By, (c) = Cosigned By      Initials Name Provider Type    LB Idalmis Tineo, PT Physical Therapist                             Manual Rx (last 36 hours)       Manual Treatments       Row Name 08/11/23 0900             Total Minutes    41591 - PT Manual Therapy Minutes 10  -LB         Manual Rx 1    Manual Rx 1 Location R hip, glute, piriformis  -LB      Manual Rx 1 Type STM using pool  noodle  -LB      Manual Rx 1 Grade L SLing  -LB      Manual Rx 1 Duration 10  -LB                User Key  (r) = Recorded By, (t) = Taken By, (c) = Cosigned By      Initials Name Provider Type    Idalmis Ryan, PT Physical Therapist                     PT OP Goals       Row Name 08/11/23 0900          PT Short Term Goals    STG Date to Achieve 09/08/23  -LB     STG 1 pt. to be I with initial HEP to facilitate self management of their condition  -LB     STG 1 Progress Met  -LB     STG 1 Progress Comments reviewed today  -LB     STG 2 pt. to be educated in/verbalize understanding of the importance of posture/ergonomics in association with their condition to facilitate self management of their condition  -LB     STG 2 Progress Ongoing  -LB     STG 3 pt. to ambulate without AD with near normal heel to toe gait pattern to facilitate ease/safety of community mobility  -LB     STG 3 Progress Ongoing  -LB     STG 3 Progress Comments pt without AD today  -LB     STG 4 Patient to report ability to lie on her right side and sleeps through the night without interruption secondary to right hip pain to facilitate optimal rest habits  -LB     STG 4 Progress Ongoing  -LB     STG 4 Progress Comments Pt able to adjust until comfortable and sleep on R side.  -LB        Long Term Goals    LTG Date to Achieve 11/17/23  -LB     LTG 1 pt. to be I with advanced HEP to facilitate self management of their condition  -LB     LTG 1 Progress Ongoing  -LB     LTG 2 pt. to report an to demonstrate decreased level of perceived disability  -LB     LTG 2 Progress Ongoing  -LB     LTG 3 pt. to ascend/descends stairs with reciprocal pattern (</= 1 rails) to facilitate ease/safety of household/community mobility  -LB     LTG 3 Progress Ongoing  -LB     LTG 4 Patient to demonstrate right hip abduction many muscle test greater than 4+ out of 5 without pain to facilitate ease of performance of household and community activities  -LB     LTG 4 Progress  Ongoing  -LB               User Key  (r) = Recorded By, (t) = Taken By, (c) = Cosigned By      Initials Name Provider Type    Idalmis Ryan PT Physical Therapist                    Therapy Education  Education Details: reviewed HEP, discussed STS mechanics, need for strengthening  Given: HEP, Symptoms/condition management, Pain management, Posture/body mechanics, Mobility training  Program: Reinforced  How Provided: Verbal, Demonstration  Provided to: Patient  Level of Understanding: Verbalized              Time Calculation:   Start Time: 0830  Stop Time: 0915  Time Calculation (min): 45 min  Total Timed Code Minutes- PT: 40 minute(s)  Timed Charges  50791 - PT Therapeutic Exercise Minutes: 35  78919 - PT Manual Therapy Minutes: 10  Total Minutes  Timed Charges Total Minutes: 10   Total Minutes: 10  Therapy Charges for Today       Code Description Service Date Service Provider Modifiers Qty    24843923969 HC PT THER PROC EA 15 MIN 8/11/2023 Idamlis Tineo, PT GP 2    03294751745  PT MANUAL THERAPY EA 15 MIN 8/11/2023 Idalmis Tineo, PT GP 1                      Idalmis Tineo PT  8/11/2023

## 2023-08-18 ENCOUNTER — HOSPITAL ENCOUNTER (OUTPATIENT)
Dept: PHYSICAL THERAPY | Facility: HOSPITAL | Age: 68
Setting detail: THERAPIES SERIES
Discharge: HOME | End: 2023-08-18
Payer: MEDICARE

## 2023-08-18 DIAGNOSIS — Z74.09 IMPAIRED MOBILITY: ICD-10-CM

## 2023-08-18 DIAGNOSIS — M25.551 RIGHT HIP PAIN: Primary | ICD-10-CM

## 2023-08-18 PROCEDURE — 97110 THERAPEUTIC EXERCISES: CPT | Performed by: PHYSICAL THERAPIST

## 2023-08-18 NOTE — THERAPY TREATMENT NOTE
Outpatient Physical Therapy Ortho Treatment Note  [facility]     Patient Name: Augustina Rodgers  : 1955  MRN: [id number]  Today's Date: 2023      Visit Date: 2023    Visit Dx:    ICD-10-CM ICD-9-CM   1. Right hip pain  M25.551 719.45   2. impaired mobility  Z74.09 799.89       Patient Active Problem List   Diagnosis    History of colon polyps    Family history of colon cancer    Anxiety    Recurrent major depression in complete remission    Colon polyp    FH: colon cancer    Overweight (BMI 25.0-29.9)    Hyperglycemia    Dry mouth    Screening for lipid disorders    Medicare annual wellness visit, subsequent    COVID-19    Cervical strain, acute    Right hip pain    Hypercholesterolemia        Past Medical History:   Diagnosis Date    Anxiety     Arthritis     Asthma     Cataract 2017    Colon polyp     COVID-19 2021    Depression     Family history of colon cancer 2017    Father    GERD (gastroesophageal reflux disease)     Heart murmur     History of colon polyps     Dr. Robles    History of medical problems      sjolgrins    Hyperplastic colon polyp     Internal hemorrhoids     Mitral valve regurgitation 2012    mild to moderate    Osteopenia     Panic disorder 1988    Posterior vitreous detachment of right eye 2016    Dr. Bev Wilder    Psychiatric illness 1988    Depression    Sjogren's disease     Tricuspid regurgitation 2012    mild to moderate        Past Surgical History:   Procedure Laterality Date    BREAST BIOPSY Right     BREAST CYST ASPIRATION       SECTION      COLONOSCOPY  2015    hyperplastic polyp    COLONOSCOPY  2011    [facility]    COLONOSCOPY  2003    inflamed tubulovillous polyp   [facility]    COLONOSCOPY N/A 2017    Procedure: COLONOSCOPY TO CECUM ANT TERMINAL ILEUM WITH COLD BIOPSY POLYPECTOMIES;  Surgeon: Xavier Robles MD;  Location: [facility] ENDOSCOPY;  Service:     COLONOSCOPY  2017      [facility] 3 polyps    COLONOSCOPY N/A 06/07/2019    Procedure: COLONOSCOPY into cecum and  TI with cold biopsy polypectomies;  Surgeon: Xavier Robles MD;  Location: [facility] ENDOSCOPY;  Service: Gastroenterology    COLONOSCOPY N/A 4/13/2023    Procedure: COLONOSCOPY to cecum into TI with cold biopsy polypectomies;  Surgeon: Xavier Robles MD;  Location: [facility] ENDOSCOPY;  Service: Gastroenterology;  Laterality: N/A;  pre- family hx colon cancer, personal hx colon polyps  post- polyps, diverticulosis, hemorrhoids    DILATATION AND CURETTAGE      at 50 y old    FERTILITY SURGERY      laproscopic    TONSILLECTOMY                          PT Assessment/Plan       Row Name 08/18/23 0845          PT Assessment    Assessment Comments Ms. Rodgers returns to clinic today reporting much improved right hip pain.  She reports that she is no longer taking pain medication for the right hip.  She reports that she is able to ace and descend stairs leading with the right leg now.  We continue to work on hip girdle core strengthening today, adding AR press and progressing resistance on several activities.  No changes for home at this time.  She does demonstrate mild antalgic gait which she is attributing to her left hip (right hip is well initiated therapy).  We reviewed activity modifications and 10% rule for progression of activities, secondary to patient stating she is not enjoying the [facility] and wants to know what to do.  We discussed aquatic aerobics, yoga and catrina chi. Ms. Rodgers continues to be a good candidate for skilled physical therapy.  -GJ        PT Plan    PT Plan Comments assess respone to progression of exercises. Continue to work on hip girdle/core strength.  ? mini forward lunge alternating, hip extension strengthening  -               User Key  (r) = Recorded By, (t) = Taken By, (c) = Cosigned By      Initials Name Provider Type    Kev Curiel, PT Physical Therapist                       OP Exercises       Row Name  08/18/23 0828 08/18/23 0800          Subjective Comments    Subjective Comments -- my R hip is feeling good. I can pull up a step with my R now.  My L hip is stiff when I go up steps  -GJ        Total Minutes    11672 - PT Therapeutic Exercise Minutes 43  -GJ --        Exercise 1    Exercise Name 1 -- Nustep  -GJ     Time 1 -- 5 minutes  -GJ        Exercise 3    Exercise Name 3 -- bridge with PPT  -GJ     Cueing 3 -- Verbal;Demo  -GJ     Sets 3 -- 2  -GJ     Reps 3 -- 10  -GJ     Time 3 -- 3s  -GJ     Additional Comments -- RTB at knees  -GJ        Exercise 4    Exercise Name 4 -- SL clam,B  -GJ     Cueing 4 -- Verbal;Demo  -GJ     Sets 4 -- 2  -GJ     Reps 4 -- 10  -GJ     Time 4 -- 3s  -GJ     Additional Comments -- RTB  -GJ        Exercise 5    Exercise Name 5 -- piriformis stretch  -GJ     Cueing 5 -- Verbal;Demo  -GJ     Reps 5 -- 3  -GJ     Time 5 -- 20s  -GJ        Exercise 7    Exercise Name 7 -- STS  -GJ     Cueing 7 -- Verbal;Demo  -GJ     Sets 7 -- 2  -GJ     Reps 7 -- 10  -GJ     Time 7 -- RTB at knees  -GJ     Additional Comments -- lowest mat table, move to chair next session  -GJ        Exercise 8    Exercise Name 8 -- standing HS curl  -GJ     Cueing 8 -- Verbal;Demo  -GJ     Sets 8 -- 2  -GJ     Reps 8 -- 10  -GJ     Time 8 -- 2#  -GJ     Additional Comments -- B  -GJ        Exercise 9    Exercise Name 9 -- lateral walking tband  -GJ     Cueing 9 -- Verbal;Demo  -GJ     Reps 9 -- 3 laps  -GJ     Time 9 -- RTB  -GJ     Additional Comments -- mid shank  -GJ        Exercise 10    Exercise Name 10 -- SLS + kickstand extxension  -GJ     Cueing 10 -- Verbal;Demo  -GJ     Sets 10 -- 2  -GJ     Reps 10 -- 10  -GJ     Time 10 -- RTB  -GJ     Additional Comments -- B, one hand support  -GJ        Exercise 11    Exercise Name 11 -- step up + knee   -GJ     Cueing 11 -- Verbal;Demo  -GJ     Sets 11 -- 2  -GJ     Reps 11 -- 10  -GJ     Time 11 -- 4 inch step  -GJ     Additional Comments -- intermittent use  of 1 UE  -GJ        Exercise 12    Exercise Name 12 -- AR press, B  -GJ     Cueing 12 -- Verbal;Demo  -GJ     Reps 12 -- 2  -GJ     Time 12 -- 10  -GJ     Additional Comments -- RTB  -GJ               User Key  (r) = Recorded By, (t) = Taken By, (c) = Cosigned By      Initials Name Provider Type    Kev Curiel, PT Physical Therapist                                  PT OP Goals       Row Name 08/18/23 0800          PT Short Term Goals    STG Date to Achieve 09/08/23  -GJ     STG 1 pt. to be I with initial HEP to facilitate self management of their condition  -GJ     STG 1 Progress Met  -GJ     STG 2 pt. to be educated in/verbalize understanding of the importance of posture/ergonomics in association with their condition to facilitate self management of their condition  -GJ     STG 2 Progress Ongoing  -GJ     STG 3 pt. to ambulate without AD with near normal heel to toe gait pattern to facilitate ease/safety of community mobility  -GJ     STG 3 Progress Ongoing  -GJ     STG 4 Patient to report ability to lie on her right side and sleeps through the night without interruption secondary to right hip pain to facilitate optimal rest habits  -GJ     STG 4 Progress Ongoing  -GJ        Long Term Goals    LTG Date to Achieve 11/17/23  -GJ     LTG 1 pt. to be I with advanced HEP to facilitate self management of their condition  -GJ     LTG 1 Progress Ongoing  -GJ     LTG 2 pt. to report an to demonstrate decreased level of perceived disability  -GJ     LTG 2 Progress Ongoing  -GJ     LTG 3 pt. to ascend/descends stairs with reciprocal pattern (</= 1 rails) to facilitate ease/safety of household/community mobility  -GJ     LTG 3 Progress Ongoing  -GJ     LTG 4 Patient to demonstrate right hip abduction many muscle test greater than 4+ out of 5 without pain to facilitate ease of performance of household and community activities  -GJ     LTG 4 Progress Ongoing  -GJ               User Key  (r) = Recorded By, (t) = Taken By,  (c) = Cosigned By      Initials Name Provider Type    Kev Curiel, PT Physical Therapist                    Therapy Education  Education Details: reviewed activity modifications.  Discussed low initiation of exercises JCC/gym.  Discussed 10% rule progression activities to allow for optimal success  Given: HEP, Symptoms/condition management, Pain management, Posture/body mechanics, Mobility training  Program: Reinforced, New, Progressed  How Provided: Verbal, Demonstration  Provided to: Patient  Level of Understanding: Teach back education performed, Verbalized, Demonstrated              Time Calculation:   Start Time: 0830  Stop Time: 0915  Time Calculation (min): 45 min  Timed Charges  28792 - PT Therapeutic Exercise Minutes: 43  Total Minutes  Timed Charges Total Minutes: 43   Total Minutes: 43  Therapy Charges for Today       Code Description Service Date Service Provider Modifiers Qty    95721850610 HC PT THER PROC EA 15 MIN 8/18/2023 Kev Everett, PT GP 3                      Kev Everett, PT  8/18/2023

## 2023-08-31 ENCOUNTER — HOSPITAL ENCOUNTER (OUTPATIENT)
Dept: PHYSICAL THERAPY | Facility: HOSPITAL | Age: 68
Setting detail: THERAPIES SERIES
Discharge: HOME | End: 2023-08-31
Payer: MEDICARE

## 2023-08-31 DIAGNOSIS — Z74.09 IMPAIRED MOBILITY: ICD-10-CM

## 2023-08-31 DIAGNOSIS — M25.551 RIGHT HIP PAIN: Primary | ICD-10-CM

## 2023-08-31 PROCEDURE — 97110 THERAPEUTIC EXERCISES: CPT | Performed by: PHYSICAL THERAPIST

## 2023-08-31 NOTE — THERAPY TREATMENT NOTE
Outpatient Physical Therapy Ortho Treatment Note  [facility]     Patient Name: Augustina Rodgers  : 1955  MRN: [id number]  Today's Date: 2023      Visit Date: 2023    Visit Dx:    ICD-10-CM ICD-9-CM   1. Right hip pain  M25.551 719.45   2. impaired mobility  Z74.09 799.89       Patient Active Problem List   Diagnosis    History of colon polyps    Family history of colon cancer    Anxiety    Recurrent major depression in complete remission    Colon polyp    FH: colon cancer    Overweight (BMI 25.0-29.9)    Hyperglycemia    Dry mouth    Screening for lipid disorders    Medicare annual wellness visit, subsequent    COVID-19    Cervical strain, acute    Right hip pain    Hypercholesterolemia        Past Medical History:   Diagnosis Date    Anxiety     Arthritis     Asthma     Cataract 2017    Colon polyp     COVID-19 2021    Depression     Family history of colon cancer 2017    Father    GERD (gastroesophageal reflux disease)     Heart murmur     History of colon polyps     Dr. Robles    History of medical problems      sjolgrins    Hyperplastic colon polyp     Internal hemorrhoids     Mitral valve regurgitation 2012    mild to moderate    Osteopenia     Panic disorder 1988    Posterior vitreous detachment of right eye 2016    Dr. Bev Wilder    Psychiatric illness 1988    Depression    Sjogren's disease     Tricuspid regurgitation 2012    mild to moderate        Past Surgical History:   Procedure Laterality Date    BREAST BIOPSY Right     BREAST CYST ASPIRATION       SECTION      COLONOSCOPY  2015    hyperplastic polyp    COLONOSCOPY  2011    [facility]    COLONOSCOPY  2003    inflamed tubulovillous polyp   [facility]    COLONOSCOPY N/A 2017    Procedure: COLONOSCOPY TO CECUM ANT TERMINAL ILEUM WITH COLD BIOPSY POLYPECTOMIES;  Surgeon: Xavier Robles MD;  Location: [facility] ENDOSCOPY;  Service:     COLONOSCOPY  2017      [facility] 3 polyps    COLONOSCOPY N/A 06/07/2019    Procedure: COLONOSCOPY into cecum and  TI with cold biopsy polypectomies;  Surgeon: Xavier Robles MD;  Location:  [facility] ENDOSCOPY;  Service: Gastroenterology    COLONOSCOPY N/A 4/13/2023    Procedure: COLONOSCOPY to cecum into TI with cold biopsy polypectomies;  Surgeon: Xavier Robles MD;  Location:  [facility] ENDOSCOPY;  Service: Gastroenterology;  Laterality: N/A;  pre- family hx colon cancer, personal hx colon polyps  post- polyps, diverticulosis, hemorrhoids    DILATATION AND CURETTAGE      at 50 y old    FERTILITY SURGERY      laproscopic    TONSILLECTOMY                          PT Assessment/Plan       Row Name 08/31/23 1001          PT Assessment    Assessment Comments Ms. Rodgers returns to the clinic reporting overall improvement in her right hip condition.  However now she reports left hip pain particular with going up stairs, not on stairs.  She reports this pain is more posterior lateral and not in the groin.  We discussed overall timeframes for healing and development and strength.  Reviewed activity modifications.  Progressed hip girdle core strengthening and updated HEP accordingly, with stretching and range of motion to be daily and strengthening be once every other day. Ms. Rodgers continues to be a good candidate for skilled physical therapy.  Assess response to progression of home exercises  -        PT Plan    PT Plan Comments Assess response to progression of home exercises.  Recert/progress note.  Continue to work on hip girdle core strength may consider tall plank at [email or web address].  -JENNIFER               User Key  (r) = Recorded By, (t) = Taken By, (c) = Cosigned By      Initials Name Provider Type    Kev Curiel, PT Physical Therapist                       OP Exercises       Row Name 08/31/23 0919 08/31/23 0900          Subjective Comments    Subjective Comments -- i had an episode after i saw you last. I went to my rheumatologist,  had a rash a fever. I also notice my L hip is hurting me more now then my R.  -GJ        Total Minutes    32565 - PT Therapeutic Exercise Minutes 40  -GJ --        Exercise 1    Exercise Name 1 -- Nustep  -GJ     Time 1 -- 5 minutes  -GJ        Exercise 7    Exercise Name 7 -- STS  -GJ     Cueing 7 -- Verbal;Demo  -GJ     Sets 7 -- 2  -GJ     Reps 7 -- 10  -GJ     Time 7 -- RTB at knees  -GJ     Additional Comments -- chair  -GJ        Exercise 8    Exercise Name 8 -- standing HS curl  -GJ     Cueing 8 -- Verbal;Demo  -GJ     Sets 8 -- 2  -GJ     Reps 8 -- 10  -GJ     Time 8 -- 3#  -GJ     Additional Comments -- B  -GJ        Exercise 9    Exercise Name 9 -- lateral walking tband  -GJ     Cueing 9 -- Verbal;Demo  -GJ     Reps 9 -- 3 laps  -GJ     Time 9 -- RTB  -GJ     Additional Comments -- mid shank  -GJ        Exercise 10    Exercise Name 10 -- SLS + kickstand extxension  -GJ     Cueing 10 -- Verbal;Demo  -GJ     Sets 10 -- 2  -GJ     Reps 10 -- 10  -GJ     Time 10 -- RTB  -GJ        Exercise 11    Exercise Name 11 -- step up + knee   -GJ     Cueing 11 -- Verbal;Demo  -GJ     Sets 11 -- 2  -GJ     Reps 11 -- 10  -GJ     Time 11 -- 6 inch  step  -GJ     Additional Comments -- 1 UE support, cues to be slow and volitional with motions  -GJ        Exercise 12    Exercise Name 12 -- AR press, B  -GJ     Cueing 12 -- Verbal;Demo  -GJ     Reps 12 -- 2, B, each configuration  -GJ     Time 12 -- 10  -GJ     Additional Comments -- RTB, intandem stance  -GJ        Exercise 13    Exercise Name 13 -- monster walk, F/B  -GJ     Cueing 13 -- Verbal;Tactile;Demo  -GJ     Reps 13 -- 3 laps  -GJ     Time 13 -- RTB  -GJ        Exercise 14    Exercise Name 14 -- mini lunge  -GJ     Cueing 14 -- Verbal;Demo  -GJ     Sets 14 -- 2  -GJ     Reps 14 -- 10  -GJ     Time 14 -- alternating  -GJ               User Key  (r) = Recorded By, (t) = Taken By, (c) = Cosigned By      Initials Name Provider Type    Kev Curiel, PT  Physical Therapist                                  PT OP Goals       Row Name 08/31/23 0900          PT Short Term Goals    STG Date to Achieve 09/08/23  -GJ     STG 1 pt. to be I with initial HEP to facilitate self management of their condition  -GJ     STG 1 Progress Met  -GJ     STG 2 pt. to be educated in/verbalize understanding of the importance of posture/ergonomics in association with their condition to facilitate self management of their condition  -GJ     STG 2 Progress Ongoing  -GJ     STG 3 pt. to ambulate without AD with near normal heel to toe gait pattern to facilitate ease/safety of community mobility  -GJ     STG 3 Progress Ongoing  -GJ     STG 4 Patient to report ability to lie on her right side and sleeps through the night without interruption secondary to right hip pain to facilitate optimal rest habits  -GJ     STG 4 Progress Ongoing  -GJ        Long Term Goals    LTG Date to Achieve 11/17/23  -GJ     LTG 1 pt. to be I with advanced HEP to facilitate self management of their condition  -GJ     LTG 1 Progress Ongoing  -GJ     LTG 2 pt. to report an to demonstrate decreased level of perceived disability  -GJ     LTG 2 Progress Ongoing  -GJ     LTG 3 pt. to ascend/descends stairs with reciprocal pattern (</= 1 rails) to facilitate ease/safety of household/community mobility  -GJ     LTG 3 Progress Ongoing  -GJ     LTG 4 Patient to demonstrate right hip abduction many muscle test greater than 4+ out of 5 without pain to facilitate ease of performance of household and community activities  -GJ     LTG 4 Progress Ongoing  -GJ               User Key  (r) = Recorded By, (t) = Taken By, (c) = Cosigned By      Initials Name Provider Type    Kev Curiel, PT Physical Therapist                    Therapy Education  Education Details: TRISHA   reviewed physiolgoy of spine/hip girdle and realistic time frames for healing, development of strength. Answered questions.  Updated HEP, stretches range of  motion to be daily and strengthening to be once every other day.  Given: HEP, Symptoms/condition management, Pain management, Mobility training  Program: New, Reinforced, Progressed  How Provided: Verbal, Demonstration, Written  Provided to: Patient  Level of Understanding: Teach back education performed, Verbalized, Demonstrated              Time Calculation:   Start Time: 0919  Stop Time: 1000  Time Calculation (min): 41 min  Timed Charges  93673 - PT Therapeutic Exercise Minutes: 40  Total Minutes  Timed Charges Total Minutes: 40   Total Minutes: 40  Therapy Charges for Today       Code Description Service Date Service Provider Modifiers Qty    12971711384 HC PT THER PROC EA 15 MIN 8/31/2023 Kev Everett, PT GP 3                      Kev Everett, PT  8/31/2023

## 2023-09-04 DIAGNOSIS — F41.9 ANXIETY: Chronic | ICD-10-CM

## 2023-09-04 DIAGNOSIS — F33.42 RECURRENT MAJOR DEPRESSION IN COMPLETE REMISSION: Chronic | ICD-10-CM

## 2023-09-04 RX ORDER — ESCITALOPRAM OXALATE 10 MG/1
10 TABLET ORAL DAILY
Qty: 90 TABLET | Refills: 1 | Status: SHIPPED | OUTPATIENT
Start: 2023-09-04

## 2023-09-05 ENCOUNTER — APPOINTMENT (OUTPATIENT)
Dept: PHYSICAL THERAPY | Facility: HOSPITAL | Age: 68
End: 2023-09-05
Payer: MEDICARE

## 2023-09-07 ENCOUNTER — HOSPITAL ENCOUNTER (OUTPATIENT)
Dept: PHYSICAL THERAPY | Facility: HOSPITAL | Age: 68
Setting detail: THERAPIES SERIES
Discharge: HOME | End: 2023-09-07
Payer: MEDICARE

## 2023-09-07 DIAGNOSIS — Z74.09 IMPAIRED MOBILITY: ICD-10-CM

## 2023-09-07 DIAGNOSIS — M25.551 RIGHT HIP PAIN: Primary | ICD-10-CM

## 2023-09-07 PROCEDURE — 97110 THERAPEUTIC EXERCISES: CPT | Performed by: PHYSICAL THERAPIST

## 2023-09-07 NOTE — THERAPY TREATMENT NOTE
Outpatient Physical Therapy Ortho Progress Note  [facility]     Patient Name: Augustina Rodgers  : 1955  MRN: [id number]  Today's Date: 2023      Visit Date: 2023    Visit Dx:    ICD-10-CM ICD-9-CM   1. Right hip pain  M25.551 719.45   2. impaired mobility  Z74.09 799.89       Patient Active Problem List   Diagnosis    History of colon polyps    Family history of colon cancer    Anxiety    Recurrent major depression in complete remission    Colon polyp    FH: colon cancer    Overweight (BMI 25.0-29.9)    Hyperglycemia    Dry mouth    Screening for lipid disorders    Medicare annual wellness visit, subsequent    COVID-19    Cervical strain, acute    Right hip pain    Hypercholesterolemia        Past Medical History:   Diagnosis Date    Anxiety     Arthritis     Asthma     Cataract 2017    Colon polyp     COVID-19 2021    Depression     Family history of colon cancer 2017    Father    GERD (gastroesophageal reflux disease)     Heart murmur     History of colon polyps     Dr. Robles    History of medical problems      sjolgrins    Hyperplastic colon polyp     Internal hemorrhoids     Mitral valve regurgitation 2012    mild to moderate    Osteopenia     Panic disorder 1988    Posterior vitreous detachment of right eye 2016    Dr. Bev Wilder    Psychiatric illness 1988    Depression    Sjogren's disease     Tricuspid regurgitation 2012    mild to moderate        Past Surgical History:   Procedure Laterality Date    BREAST BIOPSY Right     BREAST CYST ASPIRATION       SECTION      COLONOSCOPY  2015    hyperplastic polyp    COLONOSCOPY  2011    [facility]    COLONOSCOPY  2003    inflamed tubulovillous polyp   [facility]    COLONOSCOPY N/A 2017    Procedure: COLONOSCOPY TO CECUM ANT TERMINAL ILEUM WITH COLD BIOPSY POLYPECTOMIES;  Surgeon: Xavier Robles MD;  Location: [facility] ENDOSCOPY;  Service:     COLONOSCOPY  2017    Dr. Robles  [facility] 3 polyps    COLONOSCOPY N/A 06/07/2019    Procedure: COLONOSCOPY into cecum and  TI with cold biopsy polypectomies;  Surgeon: Xavier Robles MD;  Location:  [facility] ENDOSCOPY;  Service: Gastroenterology    COLONOSCOPY N/A 4/13/2023    Procedure: COLONOSCOPY to cecum into TI with cold biopsy polypectomies;  Surgeon: Xavier Robles MD;  Location:  [facility] ENDOSCOPY;  Service: Gastroenterology;  Laterality: N/A;  pre- family hx colon cancer, personal hx colon polyps  post- polyps, diverticulosis, hemorrhoids    DILATATION AND CURETTAGE      at 50 y old    FERTILITY SURGERY      laproscopic    TONSILLECTOMY          PT Ortho       Row Name 09/07/23 1100       Neural Tension Signs- Lower Quarter Clearing    Slump Bilateral:;Negative  -GJ    SLR Bilateral:;Negative  -GJ       Myotomal Screen- Lower Quarter Clearing    Hip flexion (L2) 4 (Good)  noted lateral trunk flexion/posterior lean bilaterally, indicating decreased core strength  -GJ    Knee extension (L3) Bilateral:;5 (Normal)  -GJ    Ankle DF (L4) Right:;5 (Normal);Left:;4 (Good)  -GJ    Knee flexion (S2) Bilateral:;5 (Normal)  -GJ       Hip/Thigh Palpation    Gluteus Medius Bilateral:;Tender  -GJ    Gluteus Minimus Bilateral:;Tender  -GJ       MMT Right Lower Ext    Rt Hip ABduction MMT, Gross Movement (4+/5) good plus  -GJ       MMT Left Lower Ext    Lt Hip ABduction MMT, Gross Movement (4+/5) good plus  -GJ              User Key  (r) = Recorded By, (t) = Taken By, (c) = Cosigned By      Initials Name Provider Type    GJ Kev Everett, PT Physical Therapist                                 PT Assessment/Plan       Row Name 09/07/23 1303          PT Assessment    Functional Limitations Impaired gait;Impaired locomotion;Limitation in home management;Limitations in community activities;Performance in leisure activities  -GJ     Impairments Balance;Endurance;Gait;Impaired flexibility;Impaired muscle endurance;Impaired muscle length;Impaired muscle power;Impaired  postural alignment;Muscle strength;Pain;Poor body mechanics;Posture;Range of motion  -     Assessment Comments Ms. Rodgers is a 67 y/o female. She has attended 5 sessions of physical therapy for her R hip pain.  She reports feeling about 70% better.  Her pain is now more in the L hip then the R hip (lateral aspect).  Despite subjective reports of improved condition, progress toward goals is limited, having met 2 of 4 STG's and one additonal STG is partially met.  SHe has met 0 of 4 LTG's.  She does demosntrate improving hip girdle strengh with decreasing pain. She does continue to demonstrate altered gait pattern. We have recently updated her HEP.  We will transition to seeing her once a week x 4-6 weeks to facilitate appropriate strengthening.  She will strengthen 2-3 x's per week.  Ms. Rodgers continues to be a good candidate for skilled physical therapy.  -     Please refer to paper survey for additional self-reported information Yes  -GJ     Rehab Potential Good  -GJ     Patient/caregiver participated in establishment of treatment plan and goals Yes  -GJ     Patient would benefit from skilled therapy intervention Yes  -GJ        PT Plan    PT Frequency 1x/week;2x/week  -GJ     Predicted Duration of Therapy Intervention (PT) 10 visits  -GJ     Planned CPT's? PT RE-EVAL: 42908;PT THER PROC EA 15 MIN: 51736;PT THER ACT EA 15 MIN: 85166;PT MANUAL THERAPY EA 15 MIN: 82383;PT NEUROMUSC RE-EDUCATION EA 15 MIN: 88733;PT GAIT TRAINING EA 15 MIN: 27169;PT HOT OR COLD PACK TREAT MCARE;PT ELECTRICAL STIM ATTD EA 15 MIN: 08851  -     PT Plan Comments to see once a weekx 4-6 weeks. Continue to progress core/hip girdle strengthening  -               User Key  (r) = Recorded By, (t) = Taken By, (c) = Cosigned By      Initials Name Provider Type    Kev Curiel, PT Physical Therapist                       OP Exercises       Row Name 09/07/23 1135 09/07/23 1100          Subjective Comments    Subjective Comments -- I'm  really feeling better, i'd say I'm about 70% better  -GJ        Total Minutes    84411 - PT Therapeutic Exercise Minutes 41  -GJ --        Exercise 1    Exercise Name 1 -- Nustep  -GJ     Time 1 -- 5 minutes  -GJ        Exercise 7    Exercise Name 7 -- STS  -GJ     Cueing 7 -- Verbal;Demo  -GJ     Sets 7 -- 2  -GJ     Reps 7 -- 10  -GJ     Time 7 -- RTB at knees  -GJ     Additional Comments -- chair, holding 5# weight gobblet style  -GJ        Exercise 8    Exercise Name 8 -- standing HS curl  -GJ     Cueing 8 -- Verbal;Demo  -GJ     Sets 8 -- 2  -GJ     Reps 8 -- 10  -GJ     Time 8 -- 3#  -GJ     Additional Comments -- B, on blue pad, 1 hand support  -GJ        Exercise 9    Exercise Name 9 -- lateral walking tband  -GJ     Cueing 9 -- Verbal;Demo  -GJ     Reps 9 -- 3 laps  -GJ     Time 9 -- RTB  -GJ        Exercise 10    Exercise Name 10 -- SLS shoulder ext  -GJ     Cueing 10 -- Verbal;Demo  -GJ     Sets 10 -- 2  -GJ     Reps 10 -- 10  -GJ     Time 10 -- RTB  -GJ        Exercise 11    Exercise Name 11 -- step up + knee   -GJ     Cueing 11 -- Verbal;Demo  -GJ     Sets 11 -- 2  -GJ     Reps 11 -- 10  -GJ     Time 11 -- 6 inch  step  -GJ     Additional Comments -- holding 5# weights in both sides  -GJ        Exercise 12    Exercise Name 12 -- AR press with lateral step, B  -GJ     Cueing 12 -- Verbal;Demo  -GJ     Reps 12 -- 2, B, each configuration  -GJ     Time 12 -- 10  -GJ     Additional Comments -- RTB  -GJ        Exercise 13    Exercise Name 13 -- monster walk, F/B  -GJ     Cueing 13 -- Verbal;Tactile;Demo  -GJ     Reps 13 -- 3 laps  -GJ     Time 13 -- RTB  -GJ        Exercise 14    Exercise Name 14 -- mini lunge  -GJ     Cueing 14 -- Verbal;Demo  -GJ     Sets 14 -- 2  -GJ     Reps 14 -- 10  -GJ     Time 14 -- alternating  -GJ     Additional Comments -- with trunk rotation, holding 5# weight  -GJ               User Key  (r) = Recorded By, (t) = Taken By, (c) = Cosigned By      Initials Name Provider Type     Kev Curiel, PT Physical Therapist                                  PT OP Goals       Row Name 09/07/23 1100          PT Short Term Goals    STG Date to Achieve 09/08/23  -GJ     STG 1 pt. to be I with initial HEP to facilitate self management of their condition  -GJ     STG 1 Progress Met  -GJ     STG 2 pt. to be educated in/verbalize understanding of the importance of posture/ergonomics in association with their condition to facilitate self management of their condition  -GJ     STG 2 Progress Met  -GJ     STG 3 pt. to ambulate without AD with near normal heel to toe gait pattern to facilitate ease/safety of community mobility  -GJ     STG 3 Progress Ongoing  -GJ     STG 3 Progress Comments no AD, continues to have altered gait pattern  -GJ     STG 4 Patient to report ability to lie on her right side and sleeps through the night without interruption secondary to right hip pain to facilitate optimal rest habits  -GJ     STG 4 Progress Partially Met  -GJ     STG 4 Progress Comments was able to sleep on her R side, with advil  -GJ        Long Term Goals    LTG Date to Achieve 11/17/23  -GJ     LTG 1 pt. to be I with advanced HEP to facilitate self management of their condition  -GJ     LTG 1 Progress Ongoing  -GJ     LTG 2 --  -GJ     LTG 2 Progress Ongoing  -GJ     LTG 3 pt. to ascend/descends stairs with reciprocal pattern (</= 1 rails) to facilitate ease/safety of household/community mobility  -GJ     LTG 3 Progress Ongoing  -GJ     LTG 3 Progress Comments 0-1 rail, step to pattern at times, then reciprocal at times  -GJ     LTG 4 Patient to demonstrate right hip abduction manual muscle test greater than 4+ out of 5 without pain to facilitate ease of performance of household and community activities  -GJ     LTG 4 Progress Partially Met  -GJ     LTG 4 Progress Comments see ortho  -GJ               User Key  (r) = Recorded By, (t) = Taken By, (c) = Cosigned By      Initials Name Provider Type    JENNIFER Everett  Kev TORO, PT Physical Therapist                    Therapy Education  Education Details: strengthening to congtinue to be once every other day  Given: HEP, Symptoms/condition management, Pain management, Posture/body mechanics, Mobility training  Program: Reinforced, Progressed  How Provided: Verbal, Demonstration  Provided to: Patient  Level of Understanding: Teach back education performed, Verbalized, Demonstrated              Time Calculation:   Start Time: 1135  Stop Time: 1216  Time Calculation (min): 41 min  Timed Charges  84457 - PT Therapeutic Exercise Minutes: 41  Total Minutes  Timed Charges Total Minutes: 41   Total Minutes: 41  Therapy Charges for Today       Code Description Service Date Service Provider Modifiers Qty    [id number]  PT THER PROC EA 15 MIN 9/7/2023 Kev Everett, PT GP 3                      Kev Everett, PT  9/7/2023

## 2023-09-11 NOTE — THERAPY PROGRESS REPORT/RE-CERT
Outpatient Physical Therapy Ortho Progress Note  [facility]     Patient Name: Augustina Rodgers  : 1955  MRN: [id number]  Today's Date: 2023      Visit Date: 2023    Visit Dx:    ICD-10-CM ICD-9-CM   1. Right hip pain  M25.551 719.45   2. impaired mobility  Z74.09 799.89       Patient Active Problem List   Diagnosis    History of colon polyps    Family history of colon cancer    Anxiety    Recurrent major depression in complete remission    Colon polyp    FH: colon cancer    Overweight (BMI 25.0-29.9)    Hyperglycemia    Dry mouth    Screening for lipid disorders    Medicare annual wellness visit, subsequent    COVID-19    Cervical strain, acute    Right hip pain    Hypercholesterolemia        Past Medical History:   Diagnosis Date    Anxiety     Arthritis     Asthma     Cataract 2017    Colon polyp     COVID-19 2021    Depression     Family history of colon cancer 2017    Father    GERD (gastroesophageal reflux disease)     Heart murmur     History of colon polyps     Dr. Robles    History of medical problems      sjolgrins    Hyperplastic colon polyp     Internal hemorrhoids     Mitral valve regurgitation 2012    mild to moderate    Osteopenia     Panic disorder 1988    Posterior vitreous detachment of right eye 2016    Dr. Bev Wilder    Psychiatric illness 1988    Depression    Sjogren's disease     Tricuspid regurgitation 2012    mild to moderate        Past Surgical History:   Procedure Laterality Date    BREAST BIOPSY Right     BREAST CYST ASPIRATION       SECTION      COLONOSCOPY  2015    hyperplastic polyp    COLONOSCOPY  2011    [facility]    COLONOSCOPY  2003    inflamed tubulovillous polyp   Travis [facility]    COLONOSCOPY N/A 2017    Procedure: COLONOSCOPY TO CECUM ANT TERMINAL ILEUM WITH COLD BIOPSY POLYPECTOMIES;  Surgeon: Xavier Robles MD;  Location: [facility] ENDOSCOPY;  Service:     COLONOSCOPY  2017    Dr. Robles  [facility] 3 polyps    COLONOSCOPY N/A 06/07/2019    Procedure: COLONOSCOPY into cecum and  TI with cold biopsy polypectomies;  Surgeon: Xavier Robles MD;  Location:  [facility];  Service: Gastroenterology    COLONOSCOPY N/A 4/13/2023    Procedure: COLONOSCOPY to cecum into TI with cold biopsy polypectomies;  Surgeon: Xavier Robles MD;  Location:  [facility];  Service: Gastroenterology;  Laterality: N/A;  pre- family hx colon cancer, personal hx colon polyps  post- polyps, diverticulosis, hemorrhoids    DILATATION AND CURETTAGE      at 50 y old    FERTILITY SURGERY      laproscopic    TONSILLECTOMY                                                    Therapy Education  Education Details: strengthening to congtinue to be once every other day  Given: HEP, Symptoms/condition management, Pain management, Posture/body mechanics, Mobility training  Program: Reinforced, Progressed  How Provided: Verbal, Demonstration  Provided to: Patient  Level of Understanding: Teach back education performed, Verbalized, Demonstrated              Time Calculation:   Start Time: 1135  Stop Time: 1216  Time Calculation (min): 41 min  Timed Charges  50405 - PT Therapeutic Exercise Minutes: 41  Total Minutes  Timed Charges Total Minutes: 41   Total Minutes: 41                Kev Everett, PT  9/11/2023

## 2023-09-12 ENCOUNTER — HOSPITAL ENCOUNTER (OUTPATIENT)
Dept: PHYSICAL THERAPY | Facility: HOSPITAL | Age: 68
Setting detail: THERAPIES SERIES
Discharge: HOME | End: 2023-09-12
Payer: MEDICARE

## 2023-09-12 DIAGNOSIS — M25.551 RIGHT HIP PAIN: Primary | ICD-10-CM

## 2023-09-12 DIAGNOSIS — Z74.09 IMPAIRED MOBILITY: ICD-10-CM

## 2023-09-12 PROCEDURE — 97110 THERAPEUTIC EXERCISES: CPT | Performed by: PHYSICAL THERAPIST

## 2023-09-12 NOTE — THERAPY TREATMENT NOTE
Outpatient Physical Therapy Ortho Treatment Note  [facility]     Patient Name: Augustina Rodgers  : 1955  MRN: [id number]  Today's Date: 2023      Visit Date: 2023    Visit Dx:    ICD-10-CM ICD-9-CM   1. Right hip pain  M25.551 719.45   2. impaired mobility  Z74.09 799.89       Patient Active Problem List   Diagnosis    History of colon polyps    Family history of colon cancer    Anxiety    Recurrent major depression in complete remission    Colon polyp    FH: colon cancer    Overweight (BMI 25.0-29.9)    Hyperglycemia    Dry mouth    Screening for lipid disorders    Medicare annual wellness visit, subsequent    COVID-19    Cervical strain, acute    Right hip pain    Hypercholesterolemia        Past Medical History:   Diagnosis Date    Anxiety     Arthritis     Asthma     Cataract 2017    Colon polyp     COVID-19 2021    Depression     Family history of colon cancer 2017    Father    GERD (gastroesophageal reflux disease)     Heart murmur     History of colon polyps     Dr. Robles    History of medical problems      sjolgrins    Hyperplastic colon polyp     Internal hemorrhoids     Mitral valve regurgitation 2012    mild to moderate    Osteopenia     Panic disorder 1988    Posterior vitreous detachment of right eye 2016    Dr. Bev Wilder    Psychiatric illness 1988    Depression    Sjogren's disease     Tricuspid regurgitation 2012    mild to moderate        Past Surgical History:   Procedure Laterality Date    BREAST BIOPSY Right     BREAST CYST ASPIRATION       SECTION      COLONOSCOPY  2015    hyperplastic polyp    COLONOSCOPY  2011    [facility]    COLONOSCOPY  2003    inflamed tubulovillous polyp   [facility]    COLONOSCOPY N/A 2017    Procedure: COLONOSCOPY TO CECUM ANT TERMINAL ILEUM WITH COLD BIOPSY POLYPECTOMIES;  Surgeon: Xavier Robles MD;  Location: [facility] ENDOSCOPY;  Service:     COLONOSCOPY  2017      [facility] 3 polyps    COLONOSCOPY N/A 06/07/2019    Procedure: COLONOSCOPY into cecum and  TI with cold biopsy polypectomies;  Surgeon: Xavier Robles MD;  Location:  [facility];  Service: Gastroenterology    COLONOSCOPY N/A 4/13/2023    Procedure: COLONOSCOPY to cecum into TI with cold biopsy polypectomies;  Surgeon: Xavier Robles MD;  Location:  [facility];  Service: Gastroenterology;  Laterality: N/A;  pre- family hx colon cancer, personal hx colon polyps  post- polyps, diverticulosis, hemorrhoids    DILATATION AND CURETTAGE      at 50 y old    FERTILITY SURGERY      laproscopic    TONSILLECTOMY                          PT Assessment/Plan       Row Name 09/12/23 1101          PT Assessment    Assessment Comments Ms. Rodgers returns today reporting overall improvmeet in her hip pain, however did wake up this AM with low back pain. We did not make any changes to her program today secondary to exacerbation of her LBP. She reports improvement in her LBP as the session progressed today as we continued to work on hip girdle/core strength. She will continue to perform strengthenig once every other day. We will continue to see her once a week.  Ms. Rodgers continues to be a candidate for skilled physical therapy.  -GJ        PT Plan    PT Plan Comments warm up on Nustep, assess low back pain.  Continue to strenghen hip girdle/core tissues. standing hip ext to toe tap, ? leg press  -               User Key  (r) = Recorded By, (t) = Taken By, (c) = Cosigned By      Initials Name Provider Type     Kev Everett, PT Physical Therapist                       OP Exercises       Row Name 09/12/23 1044 09/12/23 1000          Subjective    Subjective Comments -- i'm doing ok but I did wake up with some back pain this morning. Maybe it was the way I slept.  (denies N/T BLE)  -GJ        Total Minutes    79736 - PT Therapeutic Exercise Minutes 43  -GJ --        Exercise 1    Exercise Name 1 -- Nustep  -     Time 1 -- 5  minutes  -GJ        Exercise 7    Exercise Name 7 -- STS  -GJ     Cueing 7 -- Verbal;Demo  -GJ     Sets 7 -- 2  -GJ     Reps 7 -- 10  -GJ     Time 7 -- RTB at knees  -GJ     Additional Comments -- 5# gobblet hold  -GJ        Exercise 8    Exercise Name 8 -- standing HS curl  -GJ     Cueing 8 -- Verbal;Demo  -GJ     Sets 8 -- 2  -GJ     Reps 8 -- 10  -GJ     Time 8 -- 3#  -GJ     Additional Comments -- B, on blue pad, 1 hand support  -GJ        Exercise 9    Exercise Name 9 -- lateral walking tband  -GJ     Cueing 9 -- Verbal;Demo  -GJ     Reps 9 -- 3 laps  -GJ     Time 9 -- RTB  -GJ     Additional Comments -- outside of bars  -GJ        Exercise 10    Exercise Name 10 -- SLS shoulder ext  -GJ     Cueing 10 -- Verbal;Demo  -GJ     Sets 10 -- 2  -GJ     Reps 10 -- 10  -GJ     Time 10 -- RTB  -GJ     Additional Comments -- kick stand, cues or TA  -GJ        Exercise 11    Exercise Name 11 -- step up + knee   -GJ     Cueing 11 -- Verbal;Demo  -GJ     Sets 11 -- 2  -GJ     Reps 11 -- 10  -GJ     Time 11 -- 6 inch  step  -GJ     Additional Comments -- holding 5# weights in both sides  -GJ        Exercise 12    Exercise Name 12 -- AR press with lateral step, B  -GJ     Cueing 12 -- Verbal;Demo  -GJ     Reps 12 -- 2, B,  -GJ     Time 12 -- 10  -GJ     Additional Comments -- RTB  -GJ        Exercise 13    Exercise Name 13 -- monster walk, F/B  -GJ     Cueing 13 -- Verbal;Tactile;Demo  -GJ     Reps 13 -- 3 laps  -GJ     Time 13 -- RTB  -GJ     Additional Comments -- outside of bars  -GJ        Exercise 14    Exercise Name 14 -- mini lunge  -GJ     Cueing 14 -- Verbal;Demo  -GJ     Sets 14 -- 2  -GJ     Reps 14 -- 10  -GJ     Time 14 -- alternating  -GJ     Additional Comments -- with trunk rotation, holding 5# weight  -GJ               User Key  (r) = Recorded By, (t) = Taken By, (c) = Cosigned By      Initials Name Provider Type    GJ Kev Everett, PT Physical Therapist                                  PT OP Goals        Row Name 09/12/23 1000          PT Short Term Goals    STG Date to Achieve 09/08/23  -GJ     STG 1 pt. to be I with initial HEP to facilitate self management of their condition  -GJ     STG 1 Progress Met  -GJ     STG 2 pt. to be educated in/verbalize understanding of the importance of posture/ergonomics in association with their condition to facilitate self management of their condition  -GJ     STG 2 Progress Met  -GJ     STG 3 pt. to ambulate without AD with near normal heel to toe gait pattern to facilitate ease/safety of community mobility  -GJ     STG 3 Progress Ongoing  -GJ     STG 4 Patient to report ability to lie on her right side and sleeps through the night without interruption secondary to right hip pain to facilitate optimal rest habits  -GJ     STG 4 Progress Partially Met  -GJ        Long Term Goals    LTG Date to Achieve 11/17/23  -GJ     LTG 1 pt. to be I with advanced HEP to facilitate self management of their condition  -GJ     LTG 1 Progress Ongoing  -GJ     LTG 2 Progress Ongoing  -GJ     LTG 3 pt. to ascend/descends stairs with reciprocal pattern (</= 1 rails) to facilitate ease/safety of household/community mobility  -GJ     LTG 3 Progress Ongoing  -GJ     LTG 4 Patient to demonstrate right hip abduction manual muscle test greater than 4+ out of 5 without pain to facilitate ease of performance of household and community activities  -GJ     LTG 4 Progress Partially Met  -GJ               User Key  (r) = Recorded By, (t) = Taken By, (c) = Cosigned By      Initials Name Provider Type    Kev Curiel, PT Physical Therapist                    Therapy Education  Education Details: reviewed acivity modificaitons, verbally reviewed LTR/piriformis stretch if her back continues to bother her later today, she voiced understanding  Given: HEP, Symptoms/condition management, Pain management, Posture/body mechanics, Mobility training  Program: Reinforced  How Provided: Verbal,  Demonstration  Provided to: Patient  Level of Understanding: Teach back education performed, Verbalized, Demonstrated              Time Calculation:   Start Time: 1045  Stop Time: 1128  Time Calculation (min): 43 min  Timed Charges  08461 - PT Therapeutic Exercise Minutes: 43  Total Minutes  Timed Charges Total Minutes: 43   Total Minutes: 43  Therapy Charges for Today       Code Description Service Date Service Provider Modifiers Qty    50270116605 HC PT THER PROC EA 15 MIN 9/12/2023 Kev Everett, PT GP 3                      Kev Everett, PT  9/12/2023

## 2023-09-14 ENCOUNTER — APPOINTMENT (OUTPATIENT)
Dept: PHYSICAL THERAPY | Facility: HOSPITAL | Age: 68
End: 2023-09-14
Payer: MEDICARE

## 2023-09-18 NOTE — PROGRESS NOTES
Subjective   Augustina Rodgers is a 68 y.o. female who presents today for follow-up for psychiatric medication management.     Chief Complaint:  Depression, anxiety     History of Present Illness:     Medication adjustments last visit:   Continue Lexapro 10mg daily.      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   It is coming every couple months.   Depression is doing good.   No suicidal thoughts.   Dr. Tavares prescribed her last alprazolam but she wants me to take that prescription over. She takes it very rarely. Maybe 4 times a year. She last filled it in February 2022. Dr. Tavares did a UDS in July that is in our system.       Patient presents with symptoms and behaviors that are consistent with the following DSM-5 diagnoses:  1. Major depressive disorder  2. Generalized anxiety     The following portions of the patient's history were reviewed and updated as appropriate: allergies, current medications, past family history, past medical history, past social history, past surgical history and problem list.    PAST OUTPATIENT TREATMENT  Diagnosis treated:  Affective Disorder, Anxiety/Panic Disorder  Treatment Type:  Medication Management  Prior Psychiatric Medications:  Pamelor-effective.  Lexapro - effective.  Xanax - effective at half tab for acute anxiety.  Support Groups:  None  Sequelae Of Mental Disorder:  emotional distress    Interval History  Improved    Side Effects  None      Past Medical History:  Past Medical History:   Diagnosis Date    Anxiety     Arthritis 2018    Asthma     Cataract 2017    Colon polyp 2010    COVID-19 12/2021    Depression     Family history of colon cancer 02/08/2017    Father    GERD (gastroesophageal reflux disease) 2022    Heart murmur     History of colon polyps     Dr. Robles    History of medical problems     2019 sjolgrins    Hyperplastic colon polyp     Internal hemorrhoids     Mitral valve regurgitation 04/03/2012    mild to moderate    Osteopenia 2020    Panic disorder 1988     Posterior vitreous detachment of right eye 2016    Dr. Bev Wilder    Psychiatric illness 1988    Depression    Sjogren's disease     Tricuspid regurgitation 2012    mild to moderate       Social History:  Social History     Socioeconomic History    Marital status:    Tobacco Use    Smoking status: Never    Smokeless tobacco: Never   Vaping Use    Vaping Use: Never used   Substance and Sexual Activity    Alcohol use: Yes     Comment: Occ gin tonic    Drug use: No    Sexual activity: Not Currently     Partners: Male     Birth control/protection: Post-menopausal       Family History:  Family History   Problem Relation Age of Onset    Alzheimer's disease Mother     Hypertension Mother     COPD Mother     Arthritis Mother     Colon cancer Father 61    Hypertension Father     Anxiety disorder Father     Depression Father     Cancer Father     Colon polyps Brother     Breast cancer Paternal Grandmother 80       Past Surgical History:  Past Surgical History:   Procedure Laterality Date    BREAST BIOPSY Right     BREAST CYST ASPIRATION       SECTION      COLONOSCOPY  2015    hyperplastic polyp    COLONOSCOPY  2011    [facility]    COLONOSCOPY  2003    inflamed tubulovillous polyp   [facility]    COLONOSCOPY N/A 2017    Procedure: COLONOSCOPY TO CECUM ANT TERMINAL ILEUM WITH COLD BIOPSY POLYPECTOMIES;  Surgeon: Xavier Robles MD;  Location:  [facility];  Service:     COLONOSCOPY  2017    Dr. Robles [facility] 3 polyps    COLONOSCOPY N/A 2019    Procedure: COLONOSCOPY into cecum and  TI with cold biopsy polypectomies;  Surgeon: Xavier Robles MD;  Location:  [facility];  Service: Gastroenterology    COLONOSCOPY N/A 2023    Procedure: COLONOSCOPY to cecum into TI with cold biopsy polypectomies;  Surgeon: Xavier Robles MD;  [facility];  Service: Gastroenterology;  Laterality: N/A;  pre- family hx colon cancer, personal hx colon polyps  post- polyps,  diverticulosis, hemorrhoids    DILATATION AND CURETTAGE      at 50 y old    FERTILITY SURGERY      laproscopic    TONSILLECTOMY         Problem List:  Patient Active Problem List   Diagnosis    History of colon polyps    Family history of colon cancer    Anxiety    Recurrent major depression in complete remission    Colon polyp    FH: colon cancer    Overweight (BMI 25.0-29.9)    Hyperglycemia    Dry mouth    Screening for lipid disorders    Medicare annual wellness visit, subsequent    COVID-19    Cervical strain, acute    Right hip pain    Hypercholesterolemia       Allergy:   Allergies   Allergen Reactions    Ji-1 [Lidocaine] Other (See Comments)     Elevates heart rate    Cephalosporins Other (See Comments)     Elevates heart rate    Procaine Palpitations        Discontinued Medications:  Medications Discontinued During This Encounter   Medication Reason    ALPRAZolam (Xanax) 0.25 MG tablet Reorder    escitalopram (LEXAPRO) 10 MG tablet Reorder       Current Medications:   Current Outpatient Medications   Medication Sig Dispense Refill    ALPRAZolam (Xanax) 0.25 MG tablet Take 1 tablet by mouth Daily As Needed for Anxiety. 30 tablet 0    Calcium-Magnesium-Vitamin D (CALCIUM 1200+D3 PO)       Cholecalciferol (Vitamin D) 50 MCG (2000 UT) capsule       escitalopram (LEXAPRO) 10 MG tablet Take 1 tablet by mouth Daily. 90 tablet 1    meloxicam (Mobic) 15 MG tablet Take 1 tablet by mouth Daily. 30 tablet 3    Multiple Vitamins-Minerals (MULTIVITAMIN ADULT PO) Take  by mouth.      omeprazole OTC (PriLOSEC OTC) 20 MG EC tablet       Plaquenil 200 MG tablet 1 1/2 tablets/ day       No current facility-administered medications for this visit.         Psychological ROS: positive for - depression  negative for - behavioral disorder, concentration difficulties, decreased libido, disorientation, hallucinations, hostility, irritability, memory difficulties, mood swings, obsessive thoughts, physical abuse, sexual abuse,  sleep disturbances or suicidal ideation      Physical Exam:   Blood pressure 136/82, pulse 70, SpO2 95 %, not currently breastfeeding.    Mental Status Exam:   Hygiene:   good  Cooperation:  Cooperative  Eye Contact:  Good  Psychomotor Behavior:  Appropriate  Affect:  Appropriate  Mood: euthymic  Hopelessness: Denies  Speech:  Normal  Thought Process:  Goal directed and Linear  Thought Content:  Normal  Suicidal:  None  Homicidal:  None  Hallucinations:  None  Delusion:  None  Memory:  Intact  Orientation:  Person, Place, Time and Situation  Reliability:  good  Insight:  Good  Judgement:  Good  Impulse Control:  Good  Physical/Medical Issues:  No      Mental status exam was reviewed and compared to visit on 3/21/23 and appropriate updates were made.     PHQ-9 Depression Screening    Little interest or pleasure in doing things? 0-->not at all   Feeling down, depressed, or hopeless? 0-->not at all   Trouble falling or staying asleep, or sleeping too much? 0-->not at all   Feeling tired or having little energy? 0-->not at all   Poor appetite or overeating? 0-->not at all   Feeling bad about yourself - or that you are a failure or have let yourself or your family down? 0-->not at all   Trouble concentrating on things, such as reading the newspaper or watching television? 0-->not at all   Moving or speaking so slowly that other people could have noticed? Or the opposite - being so fidgety or restless that you have been moving around a lot more than usual? 0-->not at all   Thoughts that you would be better off dead, or of hurting yourself in some way? 0-->not at all   PHQ-9 Total Score 0   If you checked off any problems, how difficult have these problems made it for you to do your work, take care of things at home, or get along with other people? not difficult at all        Never smoker    I advised Augustina of the risks of tobacco use.     Result Review:    Labs:  Orders Only on 07/03/2023   Component Date Value Ref Range  Status    WBC 07/03/2023 4.30  3.40 - 10.80 10*3/mm3 Final    RBC 07/03/2023 3.63 (L)  3.77 - 5.28 10*6/mm3 Final    Hemoglobin 07/03/2023 11.9 (L)  12.0 - 15.9 g/dL Final    Hematocrit 07/03/2023 35.3  34.0 - 46.6 % Final    MCV 07/03/2023 97.2 (H)  79.0 - 97.0 fL Final    MCH 07/03/2023 32.8  26.6 - 33.0 pg Final    MCHC 07/03/2023 33.7  31.5 - 35.7 g/dL Final    RDW 07/03/2023 12.3  12.3 - 15.4 % Final    Platelets 07/03/2023 201  140 - 450 10*3/mm3 Final    Neutrophil Rel % 07/03/2023 54.0  42.7 - 76.0 % Final    Lymphocyte Rel % 07/03/2023 29.5  19.6 - 45.3 % Final    Monocyte Rel % 07/03/2023 12.6 (H)  5.0 - 12.0 % Final    Eosinophil Rel % 07/03/2023 3.0  0.3 - 6.2 % Final    Basophil Rel % 07/03/2023 0.7  0.0 - 1.5 % Final    Neutrophils Absolute 07/03/2023 2.32  1.70 - 7.00 10*3/mm3 Final    Lymphocytes Absolute 07/03/2023 1.27  0.70 - 3.10 10*3/mm3 Final    Monocytes Absolute 07/03/2023 0.54  0.10 - 0.90 10*3/mm3 Final    Eosinophils Absolute 07/03/2023 0.13  0.00 - 0.40 10*3/mm3 Final    Basophils Absolute 07/03/2023 0.03  0.00 - 0.20 10*3/mm3 Final    Immature Granulocyte Rel % 07/03/2023 0.2  0.0 - 0.5 % Final    Immature Grans Absolute 07/03/2023 0.01  0.00 - 0.05 10*3/mm3 Final    nRBC 07/03/2023 0.2  0.0 - 0.2 /100 WBC Final    Glucose 07/03/2023 95  65 - 99 mg/dL Final    BUN 07/03/2023 21  8 - 23 mg/dL Final    Creatinine 07/03/2023 1.00  0.57 - 1.00 mg/dL Final    EGFR Result 07/03/2023 61.5  >60.0 mL/min/1.73 Final    Comment: GFR Normal >60  Chronic Kidney Disease <60  Kidney Failure <15      BUN/Creatinine Ratio 07/03/2023 21.0  7.0 - 25.0 Final    Sodium 07/03/2023 140  136 - 145 mmol/L Final    Potassium 07/03/2023 4.4  3.5 - 5.2 mmol/L Final    Chloride 07/03/2023 105  98 - 107 mmol/L Final    Total CO2 07/03/2023 26.4  22.0 - 29.0 mmol/L Final    Calcium 07/03/2023 9.5  8.6 - 10.5 mg/dL Final    Total Protein 07/03/2023 8.1  6.0 - 8.5 g/dL Final    Albumin 07/03/2023 4.3  3.5 - 5.2 g/dL  Final    Globulin 07/03/2023 3.8  gm/dL Final    A/G Ratio 07/03/2023 1.1  g/dL Final    Total Bilirubin 07/03/2023 0.2  0.0 - 1.2 mg/dL Final    Alkaline Phosphatase 07/03/2023 67  39 - 117 U/L Final    AST (SGOT) 07/03/2023 21  1 - 32 U/L Final    ALT (SGPT) 07/03/2023 14  1 - 33 U/L Final    Total Cholesterol 07/03/2023 200  0 - 200 mg/dL Final    Comment: Cholesterol Reference Ranges  (U.S. Department of Health and Human Services ATP III  Classifications)  Desirable          <200 mg/dL  Borderline High    200-239 mg/dL  High Risk          >240 mg/dL  Triglyceride Reference Ranges  (U.S. Department of Health and Human Services ATP III  Classifications)  Normal           <150 mg/dL  Borderline High  150-199 mg/dL  High             200-499 mg/dL  Very High        >500 mg/dL  HDL Reference Ranges  (U.S. Department of Health and Human Services ATP III  Classifications)  Low     <40 mg/dl (major risk factor for CHD)  High    >60 mg/dl ('negative' risk factor for CHD)  LDL Reference Ranges  (U.S. Department of Health and Human Services ATP III  Classifications)  Optimal          <100 mg/dL  Near Optimal     100-129 mg/dL  Borderline High  130-159 mg/dL  High             160-189 mg/dL  Very High        >189 mg/dL      Triglycerides 07/03/2023 128  0 - 150 mg/dL Final    HDL Cholesterol 07/03/2023 43  40 - 60 mg/dL Final    VLDL Cholesterol Abdiaziz 07/03/2023 23  5 - 40 mg/dL Final    LDL Chol Calc (NIH) 07/03/2023 134 (H)  0 - 100 mg/dL Final    Chol/HDL Ratio 07/03/2023 4.65   Final    Specific Gravity, UA 07/03/2023 1.020  1.005 - 1.030 Final    pH, UA 07/03/2023 5.5  5.0 - 8.0 Final    Color, UA 07/03/2023 Yellow   Final    REFERENCE RANGE: Yellow, Straw    Appearance, UA 07/03/2023 Clear  Clear Final    Leukocytes, UA 07/03/2023 Negative  Negative Final    Protein 07/03/2023 Trace (A)  Negative Final    Glucose, UA 07/03/2023 Negative  Negative Final    Ketones 07/03/2023 Negative  Negative Final    Blood, UA 07/03/2023  Negative  Negative Final    Bilirubin, UA 07/03/2023 Negative  Negative Final    Urobilinogen, UA 07/03/2023 Comment   Final    Comment: 0.2 E.U./dL  REFERENCE RANGE: 0.2 - 1.0 E.U./dL      Nitrite, UA 07/03/2023 Negative  Negative Final    Hemoglobin A1C 07/03/2023 5.50  4.80 - 5.60 % Final    Comment: Hemoglobin A1C Ranges:  Increased Risk for Diabetes  5.7% to 6.4%  Diabetes                     >= 6.5%  Diabetic Goal                < 7.0%      Amphetamine, Urine Qual 07/03/2023 Negative  Mttqrs=0448 ng/mL Final    Barbiturates Screen, Urine 07/03/2023 Negative  Vcycme=145 ng/mL Final    Benzodiazepine Screen, Urine 07/03/2023 Negative  Xpqfmk=870 ng/mL Final    THC Screen, Urine 07/03/2023 Negative  Cutoff=20 ng/mL Final    Cocaine Screen, Urine 07/03/2023 Negative  Rscsdp=205 ng/mL Final    Opiate Screen, Urine 07/03/2023 Negative  Duocbg=827 ng/mL Final    Opiate test includes Codeine, Morphine, Hydromorphone, Hydrocodone.    Oxycodone/Oxymorphone, Urine 07/03/2023 Negative  Ykxqax=736 ng/mL Final    Test includes Oxycodone and Oxymorphone    Phencyclidine (PCP), Urine 07/03/2023 Negative  Cutoff=25 ng/mL Final    Methadone Screen, Urine 07/03/2023 Negative  Fjqgpx=386 ng/mL Final    Propoxyphene Screen 07/03/2023 Negative  Tluubl=731 ng/mL Final    Creatinine, Urine 07/03/2023 131.9  20.0 - 300.0 mg/dL Final    pH, UA 07/03/2023 7.5  4.5 - 8.9 Final    Please note 07/03/2023 Comment   Final    Comment: This assay provides a preliminary unconfirmed analytical test  result that may be suitable for clinical management of patients  in certain situations. Drug-test results should be interpreted  in the context of clinical information. Patient metabolic  variables, specific drug chemistry, and specimen  characteristics can affect test outcome. Technical consultation  is available if a test result is inconsistent with an expected  outcome. (email-[email or web address] or call toll-free  [phone])      WBC,  UA 07/03/2023 3-5 (A)  /HPF Final    REFERENCE RANGE: None Seen, 0-2    RBC, UA 07/03/2023 0-2  /HPF Final    REFERENCE RANGE: None Seen, 0-2    Epithelial Cells (non renal) 07/03/2023 0-2  /HPF Final    REFERENCE RANGE: None Seen, 0-2    Cast Type 07/03/2023 Comment   Final    HYALINE CASTS, UA            None Seen        /LPF       None Seen  N    Bacteria, UA 07/03/2023 Comment  None Seen /HPF Final    None Seen       Assessment & Plan   Diagnoses and all orders for this visit:    1. Major depressive disorder, recurrent episode, moderate (Primary)  -     escitalopram (LEXAPRO) 10 MG tablet; Take 1 tablet by mouth Daily.  Dispense: 90 tablet; Refill: 1    2. Generalized anxiety disorder  -     escitalopram (LEXAPRO) 10 MG tablet; Take 1 tablet by mouth Daily.  Dispense: 90 tablet; Refill: 1  -     ALPRAZolam (Xanax) 0.25 MG tablet; Take 1 tablet by mouth Daily As Needed for Anxiety.  Dispense: 30 tablet; Refill: 0      Continue Lexapro 10mg daily.    Continue alprazolam 0.25mg daily PRN anxiety. Patient uses this rarely. Last filled in 2022.    Visit Diagnoses:    ICD-10-CM ICD-9-CM   1. Major depressive disorder, recurrent episode, moderate  F33.1 296.32   2. Generalized anxiety disorder  F41.1 300.02         TREATMENT PLAN/GOALS: Continue supportive psychotherapy efforts and medications as indicated. Treatment and medication options discussed during today's visit. Patient ackowledged and verbally consented to continue with current treatment plan and was educated on the importance of compliance with treatment and follow-up appointments.    MEDICATION ISSUES:  INSPECT reviewed as expected    Discussed medication options and treatment plan of prescribed medication as well as the risks, benefits, and side effects including potential falls, possible impaired driving and metabolic adversities among others. Patient is agreeable to call the office with any worsening of symptoms or onset of side effects. Patient is  agreeable to call 911 or go to the nearest ER should he/she begin having SI/HI. No medication side effects or related complaints today.     MEDS ORDERED DURING VISIT:  New Medications Ordered This Visit   Medications    escitalopram (LEXAPRO) 10 MG tablet     Sig: Take 1 tablet by mouth Daily.     Dispense:  90 tablet     Refill:  1    ALPRAZolam (Xanax) 0.25 MG tablet     Sig: Take 1 tablet by mouth Daily As Needed for Anxiety.     Dispense:  30 tablet     Refill:  0       Return in about 3 months (around 12/19/2023).         This document has been electronically signed by PRICILLA Aragon  September 19, 2023 08:14 EDT    Part of this note may be an electronic transcription/translation of spoken language to printed text using the Dragon Dictation System.    Answers for HPI/ROS submitted by the patient on 3/14/2023  Please describe your symptoms.: History of depression, anxiety, at present doing ok, have been on lexapro a year.  Have you had these symptoms before?: Yes  How long have you been having these symptoms?: Greater than 2 weeks  Please list any medications you are currently taking for this condition.: Lexapro 10mg daily, Zanax .25 mg as needed for anxiety  Please describe any probable cause for these symptoms. : Stress, anxiety  What is the primary reason for your visit?: Other

## 2023-09-19 ENCOUNTER — HOSPITAL ENCOUNTER (OUTPATIENT)
Dept: PHYSICAL THERAPY | Facility: HOSPITAL | Age: 68
Setting detail: THERAPIES SERIES
Discharge: HOME | End: 2023-09-19
Payer: MEDICARE

## 2023-09-19 ENCOUNTER — OFFICE VISIT (OUTPATIENT)
Dept: PSYCHIATRY | Facility: CLINIC | Age: 68
End: 2023-09-19
Payer: MEDICARE

## 2023-09-19 VITALS — HEART RATE: 70 BPM | SYSTOLIC BLOOD PRESSURE: 136 MMHG | DIASTOLIC BLOOD PRESSURE: 82 MMHG | OXYGEN SATURATION: 95 %

## 2023-09-19 DIAGNOSIS — M25.551 RIGHT HIP PAIN: Primary | ICD-10-CM

## 2023-09-19 DIAGNOSIS — F41.1 GENERALIZED ANXIETY DISORDER: Chronic | ICD-10-CM

## 2023-09-19 DIAGNOSIS — Z74.09 IMPAIRED MOBILITY: ICD-10-CM

## 2023-09-19 DIAGNOSIS — F33.1 MAJOR DEPRESSIVE DISORDER, RECURRENT EPISODE, MODERATE: Primary | Chronic | ICD-10-CM

## 2023-09-19 PROCEDURE — 97110 THERAPEUTIC EXERCISES: CPT | Performed by: PHYSICAL THERAPIST

## 2023-09-19 RX ORDER — ESCITALOPRAM OXALATE 10 MG/1
10 TABLET ORAL DAILY
Qty: 90 TABLET | Refills: 1 | Status: SHIPPED | OUTPATIENT
Start: 2023-09-19

## 2023-09-19 RX ORDER — ALPRAZOLAM 0.25 MG/1
0.25 TABLET ORAL DAILY PRN
Qty: 30 TABLET | Refills: 0 | Status: SHIPPED | OUTPATIENT
Start: 2023-09-19

## 2023-09-19 NOTE — THERAPY TREATMENT NOTE
Outpatient Physical Therapy Ortho Treatment Note  [facility]     Patient Name: Augustina Rodgers  : 1955  MRN: [id number]  Today's Date: 2023      Visit Date: 2023    Visit Dx:    ICD-10-CM ICD-9-CM   1. Right hip pain  M25.551 719.45   2. impaired mobility  Z74.09 799.89       Patient Active Problem List   Diagnosis    History of colon polyps    Family history of colon cancer    Anxiety    Recurrent major depression in complete remission    Colon polyp    FH: colon cancer    Overweight (BMI 25.0-29.9)    Hyperglycemia    Dry mouth    Screening for lipid disorders    Medicare annual wellness visit, subsequent    COVID-19    Cervical strain, acute    Right hip pain    Hypercholesterolemia        Past Medical History:   Diagnosis Date    Anxiety     Arthritis     Asthma     Cataract 2017    Colon polyp     COVID-19 2021    Depression     Family history of colon cancer 2017    Father    GERD (gastroesophageal reflux disease)     Heart murmur     History of colon polyps     Dr. Robles    History of medical problems      sjolgrins    Hyperplastic colon polyp     Internal hemorrhoids     Mitral valve regurgitation 2012    mild to moderate    Osteopenia     Panic disorder 1988    Posterior vitreous detachment of right eye 2016    Dr. Bev Wilder    Psychiatric illness 1988    Depression    Sjogren's disease     Tricuspid regurgitation 2012    mild to moderate        Past Surgical History:   Procedure Laterality Date    BREAST BIOPSY Right     BREAST CYST ASPIRATION       SECTION      COLONOSCOPY  2015    hyperplastic polyp    COLONOSCOPY  2011    [facility]    COLONOSCOPY  2003    inflamed tubulovillous polyp   [facility]    COLONOSCOPY N/A 2017    Procedure: COLONOSCOPY TO CECUM ANT TERMINAL ILEUM WITH COLD BIOPSY POLYPECTOMIES;  Surgeon: Xavier Robles MD;  Location: [facility] ENDOSCOPY;  Service:     COLONOSCOPY  2017      [facility] 3 polyps    COLONOSCOPY N/A 06/07/2019    Procedure: COLONOSCOPY into cecum and  TI with cold biopsy polypectomies;  Surgeon: Xavier Robles MD;  Location:  [facility] ENDOSCOPY;  Service: Gastroenterology    COLONOSCOPY N/A 4/13/2023    Procedure: COLONOSCOPY to cecum into TI with cold biopsy polypectomies;  Surgeon: Xavier Robles MD;  Location: [facility] ENDOSCOPY;  Service: Gastroenterology;  Laterality: N/A;  pre- family hx colon cancer, personal hx colon polyps  post- polyps, diverticulosis, hemorrhoids    DILATATION AND CURETTAGE      at 50 y old    FERTILITY SURGERY      laproscopic    TONSILLECTOMY                          PT Assessment/Plan       Row Name 09/19/23 1026          PT Assessment    Assessment Comments Ms. Rodgers returns to the clinic reporting overall improvement in her condition. Continues to report difficulty with stairs and after prolonged sitting (stiffnness mainly with prolonged sitting). We discussed maybe trying a different chair at home to see if this makes a difference. She did demosntrate improved gait pattern today upon entering the clinic. We progressed her hip girdle/core strengthening activities in the clinic today with increase in reisstance/difficulty level of several exercises, added leg press and standing hip ext.  Ms. Rodgers continues to be a good candidate for skilled physical therapy and is making appropriate progress toward all remaining functional goals.  -GJ        PT Plan    PT Plan Comments continue to work on hip girdle and core strength  -               User Key  (r) = Recorded By, (t) = Taken By, (c) = Cosigned By      Initials Name Provider Type    Kev Curiel, PT Physical Therapist                       OP Exercises       Row Name 09/19/23 1002 09/19/23 1000          Subjective    Subjective Comments -- when i wake up I'm pretty good. It bothers me after I sit for awhile. Stairs still bother me a little  -        Total Minutes    09175 - PT Therapeutic  Exercise Minutes 43  -GJ --        Exercise 1    Exercise Name 1 -- Nustep  -GJ     Time 1 -- 5 minutes  -GJ        Exercise 2    Exercise Name 2 -- leg press, BLE  -GJ     Cueing 2 -- Verbal;Demo  -GJ     Sets 2 -- 2  -GJ     Reps 2 -- 12  -GJ     Time 2 -- 90#  -GJ        Exercise 7    Exercise Name 7 -- STS  -GJ     Cueing 7 -- Verbal;Demo  -GJ     Sets 7 -- 2  -GJ     Reps 7 -- 10  -GJ     Time 7 -- RTB at knees  -GJ     Additional Comments -- 5# gobblet hold  -GJ        Exercise 8    Exercise Name 8 -- standing HS curl  -GJ     Cueing 8 -- Verbal;Demo  -GJ     Sets 8 -- 2  -GJ     Reps 8 -- 10  -GJ     Time 8 -- 3#  -GJ     Additional Comments -- B, on blue pad, 1 hand support  -GJ        Exercise 9    Exercise Name 9 -- lateral walking tband  -GJ     Cueing 9 -- Verbal;Demo  -GJ     Reps 9 -- 3 laps  -GJ     Time 9 -- RTB  -GJ     Additional Comments -- 5# gobblet hold  -GJ        Exercise 10    Exercise Name 10 -- SLS shoulder ext  -GJ     Cueing 10 -- Verbal;Demo  -GJ     Sets 10 -- 2  -GJ     Reps 10 -- 10  -GJ     Time 10 -- RTB  -GJ     Additional Comments -- kick stand, cues or TA  -GJ        Exercise 11    Exercise Name 11 -- step up + knee   -GJ     Cueing 11 -- Verbal;Demo  -GJ     Sets 11 -- 2  -GJ     Reps 11 -- 10  -GJ     Time 11 -- 8 inch  step  -GJ     Additional Comments -- holding 5# weights in both sides  -GJ        Exercise 12    Exercise Name 12 -- AR press with lateral step, B  -GJ     Cueing 12 -- Verbal;Demo  -GJ     Reps 12 -- 2, B,  -GJ     Time 12 -- 10  -GJ     Additional Comments -- --  RTB  -GJ        Exercise 13    Exercise Name 13 -- monster walk, F/B  -GJ     Cueing 13 -- Verbal;Tactile;Demo  -GJ     Reps 13 -- 3 laps  -GJ     Time 13 -- RTB  -GJ     Additional Comments -- 5# gobblet hold  -GJ        Exercise 15    Exercise Name 15 -- prone quad/hip flexor stretch  -GJ     Cueing 15 -- Verbal;Demo  -GJ     Reps 15 -- 3  -GJ     Time 15 -- 20s  -GJ     Additional Comments  -- belt  -GJ        Exercise 16    Exercise Name 16 -- standing hip extension, B  -GJ     Cueing 16 -- Verbal;Demo  -GJ     Sets 16 -- 2  -GJ     Reps 16 -- 10  -GJ     Time 16 -- RTB  -GJ     Additional Comments -- cures for TA  -GJ               User Key  (r) = Recorded By, (t) = Taken By, (c) = Cosigned By      Initials Name Provider Type    GJ Kev Everett, PT Physical Therapist                                  PT OP Goals       Row Name 09/19/23 1000          PT Short Term Goals    STG Date to Achieve 09/08/23  -GJ     STG 1 pt. to be I with initial HEP to facilitate self management of their condition  -GJ     STG 1 Progress Met  -GJ     STG 2 pt. to be educated in/verbalize understanding of the importance of posture/ergonomics in association with their condition to facilitate self management of their condition  -GJ     STG 2 Progress Met  -GJ     STG 3 pt. to ambulate without AD with near normal heel to toe gait pattern to facilitate ease/safety of community mobility  -GJ     STG 3 Progress Ongoing  -GJ     STG 3 Progress Comments improving  -GJ     STG 4 Patient to report ability to lie on her right side and sleeps through the night without interruption secondary to right hip pain to facilitate optimal rest habits  -GJ     STG 4 Progress Partially Met  -GJ        Long Term Goals    LTG Date to Achieve 11/17/23  -GJ     LTG 1 pt. to be I with advanced HEP to facilitate self management of their condition  -GJ     LTG 1 Progress Ongoing  -GJ     LTG 2 Progress Ongoing  -GJ     LTG 3 pt. to ascend/descends stairs with reciprocal pattern (</= 1 rails) to facilitate ease/safety of household/community mobility  -GJ     LTG 3 Progress Ongoing  -GJ     LTG 4 Patient to demonstrate right hip abduction manual muscle test greater than 4+ out of 5 without pain to facilitate ease of performance of household and community activities  -GJ     LTG 4 Progress Partially Met  -GJ               User Key  (r) = Recorded By, (t)  = Taken By, (c) = Cosigned By      Initials Name Provider Type     Kev Everett, PT Physical Therapist                    Therapy Education  Education Details: dsicussed sitting options and trying new chair  Given: HEP, Symptoms/condition management, Pain management, Posture/body mechanics, Mobility training  Program: Reinforced, Progressed, New, Modified  How Provided: Verbal, Demonstration, Written  Provided to: Patient  Level of Understanding: Teach back education performed, Verbalized, Demonstrated              Time Calculation:   Start Time: 1002  Stop Time: 1045  Time Calculation (min): 43 min  Timed Charges  09308 - PT Therapeutic Exercise Minutes: 43  Total Minutes  Timed Charges Total Minutes: 43   Total Minutes: 43  Therapy Charges for Today       Code Description Service Date Service Provider Modifiers Qty    10109533174 HC PT THER PROC EA 15 MIN 9/19/2023 Kev Everett, PT GP 3                      Kev Everett, PT  9/19/2023

## 2023-09-21 ENCOUNTER — APPOINTMENT (OUTPATIENT)
Dept: PHYSICAL THERAPY | Facility: HOSPITAL | Age: 68
End: 2023-09-21
Payer: MEDICARE

## 2023-09-26 ENCOUNTER — HOSPITAL ENCOUNTER (OUTPATIENT)
Dept: PHYSICAL THERAPY | Facility: HOSPITAL | Age: 68
Setting detail: THERAPIES SERIES
Discharge: HOME | End: 2023-09-26
Payer: MEDICARE

## 2023-09-26 DIAGNOSIS — M25.551 RIGHT HIP PAIN: Primary | ICD-10-CM

## 2023-09-26 DIAGNOSIS — Z74.09 IMPAIRED MOBILITY: ICD-10-CM

## 2023-09-26 PROCEDURE — 97110 THERAPEUTIC EXERCISES: CPT | Performed by: PHYSICAL THERAPIST

## 2023-09-28 ENCOUNTER — CLINICAL SUPPORT (OUTPATIENT)
Dept: FAMILY MEDICINE CLINIC | Facility: CLINIC | Age: 68
End: 2023-09-28
Payer: MEDICARE

## 2023-09-28 ENCOUNTER — APPOINTMENT (OUTPATIENT)
Dept: PHYSICAL THERAPY | Facility: HOSPITAL | Age: 68
End: 2023-09-28
Payer: MEDICARE

## 2023-09-28 PROCEDURE — 90662 IIV NO PRSV INCREASED AG IM: CPT | Performed by: INTERNAL MEDICINE

## 2023-09-28 PROCEDURE — G0008 ADMIN INFLUENZA VIRUS VAC: HCPCS | Performed by: INTERNAL MEDICINE

## 2023-10-23 ENCOUNTER — HOSPITAL ENCOUNTER (OUTPATIENT)
Dept: CARDIOLOGY | Facility: HOSPITAL | Age: 68
Discharge: HOME | End: 2023-10-23
Admitting: INTERNAL MEDICINE
Payer: MEDICARE

## 2023-10-23 VITALS
HEIGHT: 62 IN | WEIGHT: 179 LBS | DIASTOLIC BLOOD PRESSURE: 82 MMHG | SYSTOLIC BLOOD PRESSURE: 149 MMHG | HEART RATE: 65 BPM | BODY MASS INDEX: 32.94 KG/M2

## 2023-10-23 DIAGNOSIS — Z13.6 ENCOUNTER FOR SCREENING FOR VASCULAR DISEASE: ICD-10-CM

## 2023-10-23 LAB
BH CV ECHO MEAS - DIST AO DIAM: 1.54 CM
BH CV VAS BP LEFT ARM: NORMAL MMHG
BH CV VAS BP RIGHT ARM: NORMAL MMHG
BH CV XLRA MEAS - MID AO DIAM: 1.65 CM
BH CV XLRA MEAS - PAD LEFT ABI DP: 1.32
BH CV XLRA MEAS - PAD LEFT ABI PT: 1.38
BH CV XLRA MEAS - PAD LEFT ARM: 144 MMHG
BH CV XLRA MEAS - PAD LEFT LEG DP: 197 MMHG
BH CV XLRA MEAS - PAD LEFT LEG PT: 206 MMHG
BH CV XLRA MEAS - PAD RIGHT ABI DP: 1.36
BH CV XLRA MEAS - PAD RIGHT ABI PT: 1.39
BH CV XLRA MEAS - PAD RIGHT ARM: 149 MMHG
BH CV XLRA MEAS - PAD RIGHT LEG DP: 202 MMHG
BH CV XLRA MEAS - PAD RIGHT LEG PT: 207 MMHG
BH CV XLRA MEAS - PROX AO DIAM: 2.12 CM
BH CV XLRA MEAS LEFT ICA/CCA RATIO: 1.14
BH CV XLRA MEAS LEFT MID CCA PSV: NORMAL CM/SEC
BH CV XLRA MEAS LEFT MID ICA PSV: NORMAL CM/SEC
BH CV XLRA MEAS LEFT PROX ECA PSV: NORMAL CM/SEC
BH CV XLRA MEAS RIGHT ICA/CCA RATIO: 1.16
BH CV XLRA MEAS RIGHT MID CCA PSV: NORMAL CM/SEC
BH CV XLRA MEAS RIGHT MID ICA PSV: NORMAL CM/SEC
BH CV XLRA MEAS RIGHT PROX ECA PSV: NORMAL CM/SEC
MAXIMAL PREDICTED HEART RATE: 152 BPM
STRESS TARGET HR: 129 BPM

## 2023-10-23 PROCEDURE — 93799 UNLISTED CV SVC/PROCEDURE: CPT

## 2023-11-07 ENCOUNTER — APPOINTMENT (OUTPATIENT)
Dept: PHYSICAL THERAPY | Facility: HOSPITAL | Age: 68
End: 2023-11-07
Payer: MEDICARE

## 2023-11-14 ENCOUNTER — HOSPITAL ENCOUNTER (OUTPATIENT)
Dept: PHYSICAL THERAPY | Facility: HOSPITAL | Age: 68
Setting detail: THERAPIES SERIES
Discharge: HOME | End: 2023-11-14
Payer: MEDICARE

## 2023-11-14 DIAGNOSIS — M25.551 RIGHT HIP PAIN: ICD-10-CM

## 2023-11-14 DIAGNOSIS — Z74.09 IMPAIRED MOBILITY: Primary | ICD-10-CM

## 2023-11-14 PROCEDURE — 97110 THERAPEUTIC EXERCISES: CPT | Performed by: PHYSICAL THERAPIST

## 2023-11-14 NOTE — THERAPY DISCHARGE NOTE
Outpatient Physical Therapy Ortho Treatment Note/Discharge Summary  [facility]     Patient Name: Augustina Rodgers  : 1955  MRN: [id number]  Today's Date: 2023      Visit Date: 2023    Visit Dx:    ICD-10-CM ICD-9-CM   1. impaired mobility  Z74.09 799.89   2. Right hip pain  M25.551 719.45       Patient Active Problem List   Diagnosis    History of colon polyps    Family history of colon cancer    Anxiety    Recurrent major depression in complete remission    Colon polyp    FH: colon cancer    Overweight (BMI 25.0-29.9)    Hyperglycemia    Dry mouth    Screening for lipid disorders    Medicare annual wellness visit, subsequent    COVID-19    Cervical strain, acute    Right hip pain    Hypercholesterolemia        Past Medical History:   Diagnosis Date    Anxiety     Arthritis     Asthma     Cataract 2017    Colon polyp 2010    COVID-19 2021    Depression     Family history of colon cancer 2017    Father    GERD (gastroesophageal reflux disease)     Heart murmur     History of colon polyps     Dr. Robles    History of medical problems      sjolgrins    Hyperplastic colon polyp     Internal hemorrhoids     Mitral valve regurgitation 2012    mild to moderate    Osteopenia     Panic disorder 1988    Posterior vitreous detachment of right eye 2016    Dr. Bev Wilder    Psychiatric illness 1988    Depression    Sjogren's disease     Tricuspid regurgitation 2012    mild to moderate        Past Surgical History:   Procedure Laterality Date    BREAST BIOPSY Right     BREAST CYST ASPIRATION       SECTION      COLONOSCOPY  2015    hyperplastic polyp    COLONOSCOPY  2011    [facility]    COLONOSCOPY  2003    inflamed tubulovillous polyp   [facility]    COLONOSCOPY N/A 2017    Procedure: COLONOSCOPY TO CECUM ANT TERMINAL ILEUM WITH COLD BIOPSY POLYPECTOMIES;  Surgeon: Xavier Robles MD;  Location: [facility] ENDOSCOPY;  Service:     COLONOSCOPY   06/16/2017    Dr. Robles [facility] 3 polyps    COLONOSCOPY N/A 06/07/2019    Procedure: COLONOSCOPY into cecum and  TI with cold biopsy polypectomies;  Surgeon: Xavier Robles MD;  Location: [facility] ENDOSCOPY;  Service: Gastroenterology    COLONOSCOPY N/A 4/13/2023    Procedure: COLONOSCOPY to cecum into TI with cold biopsy polypectomies;  Surgeon: Xavier Robles MD;  Location: [facility] ENDOSCOPY;  Service: Gastroenterology;  Laterality: N/A;  pre- family hx colon cancer, personal hx colon polyps  post- polyps, diverticulosis, hemorrhoids    DILATATION AND CURETTAGE      at 50 y old    FERTILITY SURGERY      laproscopic    TONSILLECTOMY                          PT Assessment/Plan       Row Name 11/14/23 1011          PT Assessment    Assessment Comments Ms. Rodgers attended 9 sessions of physical therapyy from 8/3/2023-11/14/2023 for her Bilateral hip pain. She voices a good understanding of her condition. She is independent with her HEP, reports having all pictures for exercises and bands at home. She reports that she is ready to transition to independent management. She is encouraged to call with any questions. She is encoruaged to continue HEP 2-3 x's per week and to call with any questions. Encouraged her to consider aquatic aerobic classes at [facility] (she is a member). Ms. Rodgers will transition from outpatient PT to an indepndent program.  -GJ        PT Plan    PT Plan Comments transtion to independent management.  call with questions.  -               User Key  (r) = Recorded By, (t) = Taken By, (c) = Cosigned By      Initials Name Provider Type     Kev Everett, PT Physical Therapist                         OP Exercises       Row Name 11/14/23 1004 11/14/23 1000          Subjective    Subjective Comments -- i did well on my trip.  -GJ        Total Minutes    59884 - PT Therapeutic Exercise Minutes 40  -GJ --        Exercise 1    Exercise Name 1 -- Nustep  -GJ     Time 1 -- 5 minutes  -GJ        Exercise 2    Exercise  Name 2 -- leg press, BLE  -GJ     Cueing 2 -- Verbal;Demo  -GJ     Sets 2 -- 2  -GJ     Reps 2 -- 15  -GJ     Time 2 -- 90#  -GJ        Exercise 7    Exercise Name 7 -- STS  -GJ     Cueing 7 -- Verbal;Demo  -GJ     Sets 7 -- 2  -GJ     Reps 7 -- 10  -GJ     Time 7 -- GTB at knees  -GJ     Additional Comments -- 5# gobblet hold  -GJ        Exercise 9    Exercise Name 9 -- lateral walking tband  -GJ     Cueing 9 -- Verbal;Demo  -GJ     Reps 9 -- 3 laps  -GJ     Time 9 -- GTB  -GJ     Additional Comments -- 5# gobblet hold  -GJ        Exercise 10    Exercise Name 10 -- SLS shoulder ext  -GJ     Cueing 10 -- Verbal;Demo  -GJ     Sets 10 -- 2  -GJ     Reps 10 -- 10  -GJ     Time 10 -- GTB  -GJ     Additional Comments -- kick stand, cues or TA  -GJ        Exercise 12    Exercise Name 12 -- AR press with lateral step, B  -GJ     Cueing 12 -- Verbal;Demo  -GJ     Reps 12 -- 2, B,  -GJ     Time 12 -- 10  -GJ     Additional Comments -- GTB  -GJ        Exercise 13    Exercise Name 13 -- monster walk, F/B  -GJ     Cueing 13 -- Verbal;Tactile;Demo  -GJ     Reps 13 -- 3 laps  -GJ     Time 13 -- GTB  -GJ     Additional Comments -- 5# gobblet hold  -GJ        Exercise 15    Exercise Name 15 -- prone quad/hip flexor stretch  -GJ     Cueing 15 -- Verbal;Demo  -GJ     Reps 15 -- 3  -GJ     Time 15 -- 20s  -GJ     Additional Comments -- manual  -GJ               User Key  (r) = Recorded By, (t) = Taken By, (c) = Cosigned By      Initials Name Provider Type    GJ Kev Everett W, PT Physical Therapist                                    PT OP Goals       Row Name 11/14/23 1000          PT Short Term Goals    STG Date to Achieve 09/08/23  -GJ     STG 1 pt. to be I with initial HEP to facilitate self management of their condition  -GJ     STG 1 Progress Met  -GJ     STG 2 pt. to be educated in/verbalize understanding of the importance of posture/ergonomics in association with their condition to facilitate self management of their condition   -GJ     STG 2 Progress Met  -GJ     STG 3 pt. to ambulate without AD with near normal heel to toe gait pattern to facilitate ease/safety of community mobility  -GJ     STG 3 Progress Partially Met  -GJ     STG 3 Progress Comments no AD, noted decreased pelvic/trunk disassociation  -GJ     STG 4 Patient to report ability to lie on her right side and sleeps through the night without interruption secondary to right hip pain to facilitate optimal rest habits  -GJ     STG 4 Progress Met  -GJ        Long Term Goals    LTG Date to Achieve 11/17/23  -GJ     LTG 1 pt. to be I with advanced HEP to facilitate self management of their condition  -GJ     LTG 1 Progress Met  -GJ     LTG 3 pt. to ascend/descends stairs with reciprocal pattern (</= 1 rails) to facilitate ease/safety of household/community mobility  -GJ     LTG 3 Progress Met  -GJ     LTG 4 Patient to demonstrate right hip abduction manual muscle test greater than 4+ out of 5 without pain to facilitate ease of performance of household and community activities  -     LTG 4 Progress Partially Met  -GJ               User Key  (r) = Recorded By, (t) = Taken By, (c) = Cosigned By      Initials Name Provider Type    Kev Curiel, PT Physical Therapist                    Therapy Education  Education Details: answered questions. encouragd to continue HEP 2-3 x's per week, encoruaged pt to all with questions. encouraged to consider aquatic exercises at [facility]  Given: HEP, Symptoms/condition management, Pain management, Posture/body mechanics, Mobility training  Program: Reinforced  How Provided: Verbal, Demonstration  Provided to: Patient  Level of Understanding: Teach back education performed, Verbalized, Demonstrated              Time Calculation:   Start Time: 1000  Stop Time: 1045  Time Calculation (min): 45 min  Timed Charges  47784 - PT Therapeutic Exercise Minutes: 40  Total Minutes  Timed Charges Total Minutes: 40   Total Minutes: 40  Therapy Charges for Today        Code Description Service Date Service Provider Modifiers Qty    68922596058 HC PT THER PROC EA 15 MIN 11/14/2023 Kev Everett, PT GP 3                  OP PT Discharge Summary  Date of Discharge: 11/14/23  Reason for Discharge: Independent, Patient/Caregiver request  Outcomes Achieved: Patient able to partially acheive established goals  Discharge Destination: Home with home program  Discharge Instructions/Additional Comments: continue HEP BIW, call with questions.      Kev Everett, PT  11/14/2023

## 2023-11-27 DIAGNOSIS — F41.1 GENERALIZED ANXIETY DISORDER: Chronic | ICD-10-CM

## 2023-11-27 DIAGNOSIS — F33.1 MAJOR DEPRESSIVE DISORDER, RECURRENT EPISODE, MODERATE: Chronic | ICD-10-CM

## 2023-11-27 RX ORDER — ESCITALOPRAM OXALATE 10 MG/1
10 TABLET ORAL DAILY
Qty: 90 TABLET | Refills: 1 | Status: SHIPPED | OUTPATIENT
Start: 2023-11-27

## 2023-12-18 NOTE — PROGRESS NOTES
Subjective   Augustina Rodgers is a 68 y.o. female who presents today for follow-up for psychiatric medication management.     Chief Complaint:  Depression, anxiety     History of Present Illness:     Medication adjustments last visit:   Continue Lexapro 10mg daily.    Continue alprazolam 0.25mg daily PRN anxiety. Patient uses this rarely. Last filled in 2022.    She states depression and anxiety have been good.   She hasn't really used alprazolam. She states it makes her really sleepy when she does use it.   Denies any suicidal thoughts.   Sleep is ok. Her  sometimes keeps her awake with his CPAP machine and snoring.   Overall, she states her medications are working well and she'd like to keep them the same.      Patient presents with symptoms and behaviors that are consistent with the following DSM-5 diagnoses:  1. Major depressive disorder  2. Generalized anxiety     The following portions of the patient's history were reviewed and updated as appropriate: allergies, current medications, past family history, past medical history, past social history, past surgical history and problem list.    PAST OUTPATIENT TREATMENT  Diagnosis treated:  Affective Disorder, Anxiety/Panic Disorder  Treatment Type:  Medication Management  Prior Psychiatric Medications:  Pamelor-effective.  Lexapro - effective.  Xanax - effective at half tab for acute anxiety.  Support Groups:  None  Sequelae Of Mental Disorder:  emotional distress    Interval History  Improved    Side Effects  None      Past Medical History:  Past Medical History:   Diagnosis Date    Anxiety     Arthritis 2018    Asthma     Cataract 2017    Colon polyp 2010    COVID-19 12/2021    Depression     Family history of colon cancer 02/08/2017    Father    GERD (gastroesophageal reflux disease) 2022    Heart murmur     History of colon polyps     Dr. Robles    History of medical problems     2019 sjolgrins    Hyperplastic colon polyp     Internal hemorrhoids     Mitral  valve regurgitation 2012    mild to moderate    Osteopenia     Panic disorder 1988    Posterior vitreous detachment of right eye 2016    Dr. Bev Wilder    Psychiatric illness 1988    Depression    Sjogren's disease     Tricuspid regurgitation 2012    mild to moderate       Social History:  Social History     Socioeconomic History    Marital status:    Tobacco Use    Smoking status: Never    Smokeless tobacco: Never   Vaping Use    Vaping Use: Never used   Substance and Sexual Activity    Alcohol use: Yes     Comment: Occ gin tonic    Drug use: No    Sexual activity: Not Currently     Partners: Male     Birth control/protection: Post-menopausal       Family History:  Family History   Problem Relation Age of Onset    Alzheimer's disease Mother     Hypertension Mother     COPD Mother     Arthritis Mother     Colon cancer Father 61    Hypertension Father     Anxiety disorder Father     Depression Father     Cancer Father     Colon polyps Brother     Breast cancer Paternal Grandmother 80       Past Surgical History:  Past Surgical History:   Procedure Laterality Date    BREAST BIOPSY Right     BREAST CYST ASPIRATION       SECTION      COLONOSCOPY  2015    hyperplastic polyp    COLONOSCOPY  2011    [facility]    COLONOSCOPY  2003    inflamed tubulovillous polyp   [facility]    COLONOSCOPY N/A 2017    Procedure: COLONOSCOPY TO CECUM ANT TERMINAL ILEUM WITH COLD BIOPSY POLYPECTOMIES;  Surgeon: Xavier Robles MD;  Location: [facility] ENDOSCOPY;  Service:     COLONOSCOPY  2017    Dr. Robles [facility] 3 polyps    COLONOSCOPY N/A 2019    Procedure: COLONOSCOPY into cecum and  TI with cold biopsy polypectomies;  Surgeon: Xavier Robles MD;  Location: [facility] ENDOSCOPY;  Service: Gastroenterology    COLONOSCOPY N/A 2023    Procedure: COLONOSCOPY to cecum into TI with cold biopsy polypectomies;  Surgeon: Xavier Robles MD;  Location: [facility] ENDOSCOPY;  Service:  Gastroenterology;  Laterality: N/A;  pre- family hx colon cancer, personal hx colon polyps  post- polyps, diverticulosis, hemorrhoids    DILATATION AND CURETTAGE      at 50 y old    FERTILITY SURGERY      laproscopic    TONSILLECTOMY         Problem List:  Patient Active Problem List   Diagnosis    History of colon polyps    Family history of colon cancer    Anxiety    Recurrent major depression in complete remission    Colon polyp    FH: colon cancer    Overweight (BMI 25.0-29.9)    Hyperglycemia    Dry mouth    Screening for lipid disorders    Medicare annual wellness visit, subsequent    COVID-19    Cervical strain, acute    Right hip pain    Hypercholesterolemia       Allergy:   Allergies   Allergen Reactions    Ji-1 [Lidocaine] Other (See Comments)     Elevates heart rate    Cephalosporins Other (See Comments)     Elevates heart rate    Procaine Palpitations        Discontinued Medications:  There are no discontinued medications.      Current Medications:   Current Outpatient Medications   Medication Sig Dispense Refill    Oxymetazoline HCl 1 % cream With Aloe      ALPRAZolam (Xanax) 0.25 MG tablet Take 1 tablet by mouth Daily As Needed for Anxiety. 30 tablet 0    Calcium-Magnesium-Vitamin D (CALCIUM 1200+D3 PO)       Cholecalciferol (Vitamin D) 50 MCG (2000 UT) capsule       escitalopram (LEXAPRO) 10 MG tablet TAKE 1 TABLET BY MOUTH DAILY 90 tablet 1    meloxicam (Mobic) 15 MG tablet Take 1 tablet by mouth Daily. 30 tablet 3    Multiple Vitamins-Minerals (MULTIVITAMIN ADULT PO) Take  by mouth.      omeprazole OTC (PriLOSEC OTC) 20 MG EC tablet       Plaquenil 200 MG tablet 1 1/2 tablets/ day       No current facility-administered medications for this visit.         Psychological ROS: positive for - depression  negative for - behavioral disorder, concentration difficulties, decreased libido, disorientation, hallucinations, hostility, irritability, memory difficulties, mood swings, obsessive thoughts,  physical abuse, sexual abuse, sleep disturbances or suicidal ideation      Physical Exam:   not currently breastfeeding.    Mental Status Exam:   Hygiene:   good  Cooperation:  Cooperative  Eye Contact:  Good  Psychomotor Behavior:  Appropriate  Affect:  Appropriate  Mood: euthymic  Hopelessness: Denies  Speech:  Normal  Thought Process:  Goal directed and Linear  Thought Content:  Normal  Suicidal:  None  Homicidal:  None  Hallucinations:  None  Delusion:  None  Memory:  Intact  Orientation:  Person, Place, Time and Situation  Reliability:  good  Insight:  Good  Judgement:  Good  Impulse Control:  Good  Physical/Medical Issues:  No      Mental status exam was reviewed and compared to visit on 9/19/23 and appropriate updates were made.     PHQ-9 Depression Screening    Little interest or pleasure in doing things? 0-->not at all   Feeling down, depressed, or hopeless? 0-->not at all   Trouble falling or staying asleep, or sleeping too much? 0-->not at all   Feeling tired or having little energy? 0-->not at all   Poor appetite or overeating? 0-->not at all   Feeling bad about yourself - or that you are a failure or have let yourself or your family down? 0-->not at all   Trouble concentrating on things, such as reading the newspaper or watching television? 0-->not at all   Moving or speaking so slowly that other people could have noticed? Or the opposite - being so fidgety or restless that you have been moving around a lot more than usual? 0-->not at all   Thoughts that you would be better off dead, or of hurting yourself in some way? 0-->not at all   PHQ-9 Total Score 0   If you checked off any problems, how difficult have these problems made it for you to do your work, take care of things at home, or get along with other people? not difficult at all        Never smoker    I advised Augustina of the risks of tobacco use.     Result Review:    Labs:  Hospital Outpatient Visit on 10/23/2023   Component Date Value Ref Range  Status    Max. Pred. HR (100%) 10/23/2023 152  bpm Final    Target HR (85%) 10/23/2023 129  bpm Final    BH CV VAS BP RIGHT ARM 10/23/2023 149/82  mmHg Final    BH CV VAS BP LEFT ARM 10/23/2023 144/86  mmHg Final    Right Mid CCA PSV 10/23/2023 49/14  cm/sec Final    left Mid CCA PSV 10/23/2023 55/13  cm/sec Final    Mid ICA PSV 10/23/2023 57/19  cm/sec Final    Mid ICA PSV 10/23/2023 63/23  cm/sec Final    Prox ECA PSV 10/23/2023 70/9  cm/sec Final    Prox ECA PSV 10/23/2023 63/13  cm/sec Final    ICA/CCA ratio 10/23/2023 1.16   Final    ICA/CCA ratio 10/23/2023 1.14   Final    Prox Ao Diam 10/23/2023 2.12  cm Final    Mid Ao Diam 10/23/2023 1.65  cm Final    Dist Ao Diam 10/23/2023 1.54  cm Final    PAD Right Arm 10/23/2023 149  mmHg Final    PAD Left Arm 10/23/2023 144  mmHg Final    PAD Right Leg PT 10/23/2023 207  mmHg Final    PAD Left Leg PT 10/23/2023 206  mmHg Final    PAD Right Leg DP 10/23/2023 202  mmHg Final    PAD Left Leg DP 10/23/2023 197  mmHg Final    PAD Right [place]ORENCE PT 10/23/2023 1.39   Final    PAD Left [place]OR[place] PT 10/23/2023 1.38   Final    PAD Right [place]OR[place] DP 10/23/2023 1.36   Final    PAD Left [place]ORENCE DP 10/23/2023 1.32   Final       Assessment & Plan   Diagnoses and all orders for this visit:    1. Major depressive disorder, recurrent episode, moderate (Primary)    2. Generalized anxiety disorder      Continue Lexapro 10mg daily.    Continue alprazolam 0.25mg daily PRN anxiety. Patient uses this rarely.     Visit Diagnoses:    ICD-10-CM ICD-9-CM   1. Major depressive disorder, recurrent episode, moderate  F33.1 296.32   2. Generalized anxiety disorder  F41.1 300.02           TREATMENT PLAN/GOALS: Continue supportive psychotherapy efforts and medications as indicated. Treatment and medication options discussed during today's visit. Patient ackowledged and verbally consented to continue with current treatment plan and was educated on the importance of compliance with treatment and follow-up  appointments.    MEDICATION ISSUES:  INSPECT reviewed as expected    Discussed medication options and treatment plan of prescribed medication as well as the risks, benefits, and side effects including potential falls, possible impaired driving and metabolic adversities among others. Patient is agreeable to call the office with any worsening of symptoms or onset of side effects. Patient is agreeable to call 911 or go to the nearest ER should he/she begin having SI/HI. No medication side effects or related complaints today.     MEDS ORDERED DURING VISIT:  No orders of the defined types were placed in this encounter.      Return in about 6 months (around 6/19/2024).         This document has been electronically signed by PRICILLA Aragon  December 19, 2023 08:11 EST    Part of this note may be an electronic transcription/translation of spoken language to printed text using the Dragon Dictation System.    Answers for HPI/ROS submitted by the patient on 3/14/2023  Please describe your symptoms.: History of depression, anxiety, at present doing ok, have been on lexapro a year.  Have you had these symptoms before?: Yes  How long have you been having these symptoms?: Greater than 2 weeks  Please list any medications you are currently taking for this condition.: Lexapro 10mg daily, Zanax .25 mg as needed for anxiety  Please describe any probable cause for these symptoms. : Stress, anxiety  What is the primary reason for your visit?: Other

## 2023-12-19 ENCOUNTER — OFFICE VISIT (OUTPATIENT)
Dept: PSYCHIATRY | Facility: CLINIC | Age: 68
End: 2023-12-19
Payer: MEDICARE

## 2023-12-19 DIAGNOSIS — F41.1 GENERALIZED ANXIETY DISORDER: Chronic | ICD-10-CM

## 2023-12-19 DIAGNOSIS — F33.1 MAJOR DEPRESSIVE DISORDER, RECURRENT EPISODE, MODERATE: Primary | Chronic | ICD-10-CM

## 2024-01-11 DIAGNOSIS — I10 HYPERTENSION, UNSPECIFIED TYPE: ICD-10-CM

## 2024-01-11 DIAGNOSIS — R73.9 HYPERGLYCEMIA: Primary | ICD-10-CM

## 2024-01-11 DIAGNOSIS — Z13.29 SCREENING FOR THYROID DISORDER: ICD-10-CM

## 2024-01-12 LAB
ALBUMIN SERPL-MCNC: 4.5 G/DL (ref 3.5–5.2)
ALBUMIN/GLOB SERPL: 1.5 G/DL
ALP SERPL-CCNC: 67 U/L (ref 39–117)
ALT SERPL-CCNC: 17 U/L (ref 1–33)
APPEARANCE UR: CLEAR
AST SERPL-CCNC: 20 U/L (ref 1–32)
BACTERIA #/AREA URNS HPF: NORMAL /HPF
BASOPHILS # BLD AUTO: 0.04 10*3/MM3 (ref 0–0.2)
BASOPHILS NFR BLD AUTO: 1 % (ref 0–1.5)
BILIRUB SERPL-MCNC: 0.4 MG/DL (ref 0–1.2)
BILIRUB UR QL STRIP: NEGATIVE
BUN SERPL-MCNC: 17 MG/DL (ref 8–23)
BUN/CREAT SERPL: 15.7 (ref 7–25)
CALCIUM SERPL-MCNC: 9.8 MG/DL (ref 8.6–10.5)
CASTS URNS MICRO: NORMAL
CHLORIDE SERPL-SCNC: 101 MMOL/L (ref 98–107)
CHOLEST SERPL-MCNC: 193 MG/DL (ref 0–200)
CO2 SERPL-SCNC: 25.3 MMOL/L (ref 22–29)
COLOR UR: YELLOW
CREAT SERPL-MCNC: 1.08 MG/DL (ref 0.57–1)
EGFRCR SERPLBLD CKD-EPI 2021: 56.1 ML/MIN/1.73
EOSINOPHIL # BLD AUTO: 0.14 10*3/MM3 (ref 0–0.4)
EOSINOPHIL NFR BLD AUTO: 3.5 % (ref 0.3–6.2)
EPI CELLS #/AREA URNS HPF: NORMAL /HPF
ERYTHROCYTE [DISTWIDTH] IN BLOOD BY AUTOMATED COUNT: 11.7 % (ref 12.3–15.4)
GLOBULIN SER CALC-MCNC: 3.1 GM/DL
GLUCOSE SERPL-MCNC: 94 MG/DL (ref 65–99)
GLUCOSE UR QL STRIP: NEGATIVE
HBA1C MFR BLD: 5.1 % (ref 4.8–5.6)
HCT VFR BLD AUTO: 34.2 % (ref 34–46.6)
HDLC SERPL-MCNC: 48 MG/DL (ref 40–60)
HGB BLD-MCNC: 11.6 G/DL (ref 12–15.9)
HGB UR QL STRIP: NEGATIVE
IMM GRANULOCYTES # BLD AUTO: 0.01 10*3/MM3 (ref 0–0.05)
IMM GRANULOCYTES NFR BLD AUTO: 0.2 % (ref 0–0.5)
KETONES UR QL STRIP: NEGATIVE
LDLC SERPL CALC-MCNC: 130 MG/DL (ref 0–100)
LDLC/HDLC SERPL: 2.68 {RATIO}
LEUKOCYTE ESTERASE UR QL STRIP: NEGATIVE
LYMPHOCYTES # BLD AUTO: 1.23 10*3/MM3 (ref 0.7–3.1)
LYMPHOCYTES NFR BLD AUTO: 30.7 % (ref 19.6–45.3)
MCH RBC QN AUTO: 32.4 PG (ref 26.6–33)
MCHC RBC AUTO-ENTMCNC: 33.9 G/DL (ref 31.5–35.7)
MCV RBC AUTO: 95.5 FL (ref 79–97)
MONOCYTES # BLD AUTO: 0.45 10*3/MM3 (ref 0.1–0.9)
MONOCYTES NFR BLD AUTO: 11.2 % (ref 5–12)
NEUTROPHILS # BLD AUTO: 2.14 10*3/MM3 (ref 1.7–7)
NEUTROPHILS NFR BLD AUTO: 53.4 % (ref 42.7–76)
NITRITE UR QL STRIP: NEGATIVE
NRBC BLD AUTO-RTO: 0 /100 WBC (ref 0–0.2)
PH UR STRIP: 6 [PH] (ref 5–8)
PLATELET # BLD AUTO: 216 10*3/MM3 (ref 140–450)
POTASSIUM SERPL-SCNC: 4.2 MMOL/L (ref 3.5–5.2)
PROT SERPL-MCNC: 7.6 G/DL (ref 6–8.5)
PROT UR QL STRIP: NEGATIVE
RBC # BLD AUTO: 3.58 10*6/MM3 (ref 3.77–5.28)
RBC #/AREA URNS HPF: NORMAL /HPF
SODIUM SERPL-SCNC: 136 MMOL/L (ref 136–145)
SP GR UR STRIP: 1.01 (ref 1–1.03)
TRIGL SERPL-MCNC: 82 MG/DL (ref 0–150)
TSH SERPL DL<=0.005 MIU/L-ACNC: 4.02 UIU/ML (ref 0.27–4.2)
UROBILINOGEN UR STRIP-MCNC: NORMAL MG/DL
VLDLC SERPL CALC-MCNC: 15 MG/DL (ref 5–40)
WBC # BLD AUTO: 4.01 10*3/MM3 (ref 3.4–10.8)
WBC #/AREA URNS HPF: NORMAL /HPF

## 2024-01-16 ENCOUNTER — OFFICE VISIT (OUTPATIENT)
Dept: FAMILY MEDICINE CLINIC | Facility: CLINIC | Age: 69
End: 2024-01-16
Payer: MEDICARE

## 2024-01-16 VITALS
HEART RATE: 76 BPM | TEMPERATURE: 96.7 F | DIASTOLIC BLOOD PRESSURE: 82 MMHG | WEIGHT: 179.2 LBS | OXYGEN SATURATION: 98 % | BODY MASS INDEX: 33.83 KG/M2 | SYSTOLIC BLOOD PRESSURE: 138 MMHG | HEIGHT: 61 IN | RESPIRATION RATE: 16 BRPM

## 2024-01-16 DIAGNOSIS — E78.00 HYPERCHOLESTEROLEMIA: Primary | ICD-10-CM

## 2024-01-16 DIAGNOSIS — M35.00 SJOGREN'S SYNDROME WITHOUT EXTRAGLANDULAR INVOLVEMENT: ICD-10-CM

## 2024-01-16 DIAGNOSIS — F33.42 RECURRENT MAJOR DEPRESSION IN COMPLETE REMISSION: Chronic | ICD-10-CM

## 2024-01-16 NOTE — PROGRESS NOTES
Subjective   Augustina Rodgers is a 68 y.o. female. Patient is here today for   Chief Complaint   Patient presents with    Hyperlipidemia          Vitals:    01/16/24 0753   BP: 138/82   Pulse: 76   Resp: 16   Temp: 96.7 °F (35.9 °C)   SpO2: 98%     Body mass index is 33.32 kg/m².      Past Medical History:   Diagnosis Date    Anxiety     Arthritis 2018    Asthma     Cataract 2017    Colon polyp 2010    COVID-19 12/2021    Depression     Family history of colon cancer 02/08/2017    Father    GERD (gastroesophageal reflux disease) 2022    Heart murmur     History of colon polyps     Dr. Robles    History of medical problems     2019 sjolgrins    Hyperplastic colon polyp     Internal hemorrhoids     Medicare annual wellness visit, subsequent 11/20/2020    Mitral valve regurgitation 04/03/2012    mild to moderate    Osteopenia 2020    Panic disorder 1988    Posterior vitreous detachment of right eye 09/2016    Dr. Bev Wilder    Psychiatric illness 1988    Depression    Sjogren's disease     Tricuspid regurgitation 04/03/2012    mild to moderate      Allergies   Allergen Reactions    Ji-1 [Lidocaine] Other (See Comments)     Elevates heart rate    Cephalosporins Other (See Comments)     Elevates heart rate    Procaine Palpitations      Social History     Socioeconomic History    Marital status:    Tobacco Use    Smoking status: Never    Smokeless tobacco: Never   Vaping Use    Vaping Use: Never used   Substance and Sexual Activity    Alcohol use: Yes     Comment: Occ gin tonic    Drug use: No    Sexual activity: Not Currently     Partners: Male     Birth control/protection: Post-menopausal        Current Outpatient Medications:     ALPRAZolam (Xanax) 0.25 MG tablet, Take 1 tablet by mouth Daily As Needed for Anxiety., Disp: 30 tablet, Rfl: 0    Calcium-Magnesium-Vitamin D (CALCIUM 1200+D3 PO), , Disp: , Rfl:     Cholecalciferol (Vitamin D) 50 MCG (2000 UT) capsule, , Disp: , Rfl:     escitalopram (LEXAPRO) 10  MG tablet, TAKE 1 TABLET BY MOUTH DAILY, Disp: 90 tablet, Rfl: 1    meloxicam (Mobic) 15 MG tablet, Take 1 tablet by mouth Daily., Disp: 30 tablet, Rfl: 3    Multiple Vitamins-Minerals (MULTIVITAMIN ADULT PO), Take  by mouth., Disp: , Rfl:     omeprazole OTC (PriLOSEC OTC) 20 MG EC tablet, , Disp: , Rfl:     Oxymetazoline HCl 1 % cream, With Aloe, Disp: , Rfl:     Plaquenil 200 MG tablet, 1 1/2 tablets/ day, Disp: , Rfl:      Objective     History of Present Illness  This patient is here to follow-up on labs from last week.    She has been a lot of time recently following up with Dr. Richards and with a dermatologist regarding a rash that she developed on her face which in the end turned out to be rosacea.    She tells me that she feels well.  She has no complaints.  Hyperlipidemia         Review of Systems   Constitutional: Negative.    HENT: Negative.     Respiratory: Negative.     Cardiovascular: Negative.    Musculoskeletal: Negative.    Psychiatric/Behavioral: Negative.         Physical Exam  Vitals and nursing note reviewed.   Constitutional:       Appearance: Normal appearance.      Comments: Pleasant, neatly groomed, no distress.   Neck:      Vascular: No carotid bruit.   Cardiovascular:      Rate and Rhythm: Regular rhythm.      Heart sounds: Normal heart sounds. No murmur heard.     No gallop.   Pulmonary:      Effort: No respiratory distress.      Breath sounds: Normal breath sounds. No wheezing or rales.   Neurological:      Mental Status: She is alert and oriented to person, place, and time.   Psychiatric:         Mood and Affect: Mood normal.         Behavior: Behavior normal.         Thought Content: Thought content normal.           Problems Addressed this Visit          Cardiac and Vasculature    Hypercholesterolemia - Primary       Mental Health    Recurrent major depression in complete remission (Chronic)       Multi-system (Lupus, Sarcoid...)    Sjogren's syndrome without extraglandular involvement      Diagnoses         Codes Comments    Hypercholesterolemia    -  Primary ICD-10-CM: E78.00  ICD-9-CM: 272.0     Recurrent major depression in complete remission     ICD-10-CM: F33.42  ICD-9-CM: 296.36     Sjogren's syndrome without extraglandular involvement     ICD-10-CM: M35.00  ICD-9-CM: 710.2               PLAN  She and I reviewed her labs.  Her LDL cholesterol is a bit high.    In October she had vascular screening which revealed no plaque in her carotid arteries.  I think it is okay to leave her cholesterol where it is, I asked her to start taking medication at this point.    She feels that her depression is well-controlled.    She has Sjogren syndrome.  She follows up with Dr. Richards of rheumatology.  She recently was discovered to have rosacea which is resolved with treatment by the dermatologist.    I asked her to follow-up in about 6 months.  Fasting labs prior to the visit should include: Lipid profile, comprehensive metabolic panel, CBC, urinalysis.  It would be good to screen her for vitamin D deficiency at that time as well.    That visit should be for a Medicare annual wellness visit.  No follow-ups on file.  Answers submitted by the patient for this visit:  Other (Submitted on 1/9/2024)  Please describe your symptoms.: Physical  Have you had these symptoms before?: No  How long have you been having these symptoms?: Greater than 2 weeks  Primary Reason for Visit (Submitted on 1/9/2024)  What is the primary reason for your visit?: Other

## 2024-04-01 ENCOUNTER — TRANSCRIBE ORDERS (OUTPATIENT)
Dept: ADMINISTRATIVE | Facility: HOSPITAL | Age: 69
End: 2024-04-01
Payer: MEDICARE

## 2024-04-01 ENCOUNTER — OFFICE VISIT (OUTPATIENT)
Dept: OBSTETRICS AND GYNECOLOGY | Age: 69
End: 2024-04-01
Payer: MEDICARE

## 2024-04-01 VITALS
DIASTOLIC BLOOD PRESSURE: 82 MMHG | BODY MASS INDEX: 32.57 KG/M2 | SYSTOLIC BLOOD PRESSURE: 138 MMHG | WEIGHT: 177 LBS | HEIGHT: 62 IN

## 2024-04-01 DIAGNOSIS — Z12.31 VISIT FOR SCREENING MAMMOGRAM: Primary | ICD-10-CM

## 2024-04-01 DIAGNOSIS — N39.41 URGE INCONTINENCE: ICD-10-CM

## 2024-04-01 DIAGNOSIS — Z01.419 ENCOUNTER FOR GYNECOLOGICAL EXAMINATION WITHOUT ABNORMAL FINDING: ICD-10-CM

## 2024-04-01 DIAGNOSIS — Z78.0 MENOPAUSE: Primary | ICD-10-CM

## 2024-04-01 DIAGNOSIS — N39.3 STRESS INCONTINENCE: ICD-10-CM

## 2024-04-01 NOTE — PROGRESS NOTES
"Subjective     Chief Complaint   Patient presents with    Gynecologic Exam     Annual Exam - last pap 3/31/22 neg, mammo 22, dexa 21, colonoscopy 23, pt has no complaints today       History of Present Illness    Augustina Rodgers is a 68 y.o.  who presents for annual exam.  The patient has noticed some trouble with her bladder.  She is wearing a pad now.  She notes some leakage of urine with coughing and she also gets an urge and has to run to make to the bathroom on time.  She does not want any referrals medication or physical therapy.  She is having no vaginal bleeding or pelvic pain.  She was diagnosed with rosacea.  She does take Plaquenil for Sjogren's.  Patient has had normal Paps since she is over the age of 65 so we did discuss stopping Pap smears.    Obstetric History:  OB History          2    Para   2    Term   2            AB        Living             SAB        IAB        Ectopic        Molar        Multiple        Live Births                   Menstrual History:     No LMP recorded (lmp unknown). Patient is postmenopausal.         Current contraception: post menopausal status  History of abnormal Pap smear: no  Received Gardasil immunization: no  Perform regular self breast exam : yes  Family history of uterine or ovarian cancer: no  Family History of colon cancer: yes - dad 57   Family history of breast cancer: yes - PGM 80     Mammogram: ordered.  Colonoscopy: up to date.  2023 q 3   DEXA: ordered.    Exercise: walking and water aerobic 4 times a week   Calcium/Vitamin D: adequate intake and uses supplements    The following portions of the patient's history were reviewed and updated as appropriate: allergies, current medications, past family history, past medical history, past social history, past surgical history, and problem list.    Review of Systems        Objective   Physical Exam    /82   Ht 156.2 cm (61.5\")   Wt 80.3 kg (177 lb)   LMP  (LMP " Unknown)   BMI 32.90 kg/m²     General:   alert, appears stated age and cooperative   Neck: thyroid normal to palpation   Heart: regular rate and rhythm   Lungs: clear to auscultation bilaterally   Abdomen: soft, non-tender, without masses or organomegaly   Breast: inspection negative, no nipple discharge or bleeding, no masses or nodularity palpable   Vulva: normal, Bartholin's, Urethra, [place]'s normal   Vagina: normal mucosa, normal discharge   Cervix: no cervical motion tenderness and no lesions   Uterus: non-tender, normal shape and consistency   Adnexa: no mass, fullness, tenderness   Rectal: not indicated     Assessment & Plan   Diagnoses and all orders for this visit:    1. Menopause (Primary)  -     dexa bone density axial; Future    2. Encounter for gynecological examination without abnormal finding    3. Stress incontinence    4. Urge incontinence    Density is ordered.  Patient will like to do at the hospital.  She is doing her mammograms at the hospital also  Patient will call if she has any vaginal bleeding  Encouraged the patient to consider medication for urge incontinence or consider referral to urology or physical therapy.  Patient declines at this point.    All questions answered.  Breast self exam technique reviewed and patient encouraged to perform self-exam monthly.  Discussed healthy lifestyle modifications.  Recommended 30 minutes of aerobic exercise five times per week.  Discussed calcium needs to prevent osteoporosis.

## 2024-04-15 ENCOUNTER — HOSPITAL ENCOUNTER (OUTPATIENT)
Dept: MAMMOGRAPHY | Facility: HOSPITAL | Age: 69
Discharge: HOME | End: 2024-04-15
Admitting: OBSTETRICS & GYNECOLOGY
Payer: MEDICARE

## 2024-04-15 DIAGNOSIS — Z12.31 VISIT FOR SCREENING MAMMOGRAM: ICD-10-CM

## 2024-04-15 PROCEDURE — 77063 BREAST TOMOSYNTHESIS BI: CPT

## 2024-04-15 PROCEDURE — 77067 SCR MAMMO BI INCL CAD: CPT

## 2024-06-18 NOTE — PROGRESS NOTES
Subjective   Augustina Rodgers is a 69 y.o. female who presents today for follow-up for psychiatric medication management.     Chief Complaint:  Depression, anxiety     History of Present Illness:     Medication adjustments last visit:   Continue Lexapro 10mg daily.    Continue alprazolam 0.25mg daily PRN anxiety. Patient uses this rarely.     Patient here for 6 months follow up.  She feels like medications are working well. She is using the Lexapro but rarely uses the alprazolam.   Denies any suicidal thoughts.   Family is doing well. They are going on a trip to [place] in July for 2 weeks. She is excited about this.   Advised to call when she needs her alprazolam filled. She does not need a fill at this time.     Patient presents with symptoms and behaviors that are consistent with the following DSM-5 diagnoses:  1. Major depressive disorder  2. Generalized anxiety     The following portions of the patient's history were reviewed and updated as appropriate: allergies, current medications, past family history, past medical history, past social history, past surgical history and problem list.    PAST OUTPATIENT TREATMENT  Diagnosis treated:  Affective Disorder, Anxiety/Panic Disorder  Treatment Type:  Medication Management  Prior Psychiatric Medications:  Pamelor-effective.  Lexapro - effective.  Xanax - effective at half tab for acute anxiety.  Support Groups:  None  Sequelae Of Mental Disorder:  emotional distress    Interval History  Improved    Side Effects  None      Past Medical History:  Past Medical History:   Diagnosis Date    Anxiety     Arthritis 2018    Asthma     Cataract 2017    Colon polyp 2010    COVID-19 12/2021    Depression     Family history of colon cancer 02/08/2017    Father    GERD (gastroesophageal reflux disease) 2022    Heart murmur     History of colon polyps     Dr. Robles    History of medical problems     2019 sjolgrins    Hyperplastic colon polyp     Internal hemorrhoids     Medicare  annual wellness visit, subsequent 2020    Mitral valve regurgitation 2012    mild to moderate    Osteopenia     Panic disorder 1988    Posterior vitreous detachment of right eye 2016    Dr. Bev Wilder    Psychiatric illness 1988    Depression    Rosacea     Sjogren's disease     Tricuspid regurgitation 2012    mild to moderate       Social History:  Social History     Socioeconomic History    Marital status:    Tobacco Use    Smoking status: Never     Passive exposure: Never    Smokeless tobacco: Never   Vaping Use    Vaping status: Never Used   Substance and Sexual Activity    Alcohol use: Yes     Comment: Occ gin tonic    Drug use: No    Sexual activity: Not Currently     Partners: Male     Birth control/protection: Post-menopausal       Family History:  Family History   Problem Relation Age of Onset    Alzheimer's disease Mother     Hypertension Mother     COPD Mother     Arthritis Mother     Colon cancer Father 61    Hypertension Father     Anxiety disorder Father     Depression Father     Cancer Father     Colon polyps Brother     Breast cancer Paternal Grandmother 80       Past Surgical History:  Past Surgical History:   Procedure Laterality Date    BREAST BIOPSY Right     BREAST CYST ASPIRATION       SECTION      COLONOSCOPY  2015    hyperplastic polyp    COLONOSCOPY  2011    [facility]    COLONOSCOPY  2003    inflamed tubulovillous polyp   [facility]    COLONOSCOPY N/A 2017    Procedure: COLONOSCOPY TO CECUM ANT TERMINAL ILEUM WITH COLD BIOPSY POLYPECTOMIES;  Surgeon: Xavier Robles MD;  Location: [facility] ENDOSCOPY;  Service:     COLONOSCOPY  2017    Dr. Robles [facility] 3 polyps    COLONOSCOPY N/A 2019    Procedure: COLONOSCOPY into cecum and  TI with cold biopsy polypectomies;  Surgeon: Xavier Robles MD;  Location: [facility] ENDOSCOPY;  Service: Gastroenterology    COLONOSCOPY N/A 2023    Procedure: COLONOSCOPY to cecum into TI with cold  biopsy polypectomies;  Surgeon: Xavier Robles MD;  Location: [facility] ENDOSCOPY;  Service: Gastroenterology;  Laterality: N/A;  pre- family hx colon cancer, personal hx colon polyps  post- polyps, diverticulosis, hemorrhoids    DILATATION AND CURETTAGE      at 50 y old    FERTILITY SURGERY      laproscopic    TONSILLECTOMY         Problem List:  Patient Active Problem List   Diagnosis    History of colon polyps    Family history of colon cancer    Anxiety    Recurrent major depression in complete remission    Colon polyp    FH: colon cancer    Overweight (BMI 25.0-29.9)    Hyperglycemia    Dry mouth    Screening for lipid disorders    Medicare annual wellness visit, subsequent    COVID-19    Cervical strain, acute    Right hip pain    Hypercholesterolemia    Sjogren's syndrome without extraglandular involvement       Allergy:   Allergies   Allergen Reactions    Ji-1 [Lidocaine] Other (See Comments)     Elevates heart rate    Cephalosporins Other (See Comments)     Elevates heart rate    Procaine Palpitations        Discontinued Medications:  Medications Discontinued During This Encounter   Medication Reason    meloxicam (Mobic) 15 MG tablet     escitalopram (LEXAPRO) 10 MG tablet Reorder         Current Medications:   Current Outpatient Medications   Medication Sig Dispense Refill    escitalopram (LEXAPRO) 10 MG tablet Take 1 tablet by mouth Daily. 90 tablet 3    ALPRAZolam (Xanax) 0.25 MG tablet Take 1 tablet by mouth Daily As Needed for Anxiety. 30 tablet 0    Calcium-Magnesium-Vitamin D (CALCIUM 1200+D3 PO)       Cholecalciferol (Vitamin D) 50 MCG (2000 UT) capsule       Multiple Vitamins-Minerals (MULTIVITAMIN ADULT PO) Take  by mouth.      omeprazole OTC (PriLOSEC OTC) 20 MG EC tablet       Oxymetazoline HCl 1 % cream With Aloe      Plaquenil 200 MG tablet 1 1/2 tablets/ day       No current facility-administered medications for this visit.         Psychological ROS: positive for - depression  negative for -  behavioral disorder, concentration difficulties, decreased libido, disorientation, hallucinations, hostility, irritability, memory difficulties, mood swings, obsessive thoughts, physical abuse, sexual abuse, sleep disturbances or suicidal ideation      Physical Exam:   not currently breastfeeding.    MENTAL STATUS EXAM   General Appearance:  Cleanly groomed and dressed  Eye Contact:  Good eye contact  Attitude:  Cooperative  Motor Activity:  Normal gait, posture  Muscle Strength:  Normal  Speech:  Normal rate, tone, volume  Language:  Spontaneous  Mood and affect:  Normal, pleasant  Hopelessness:  Denies  Loneliness: Denies  Thought Process:  Logical and goal-directed  Associations/ Thought Content:  No delusions  Hallucinations:  None  Suicidal Ideations:  Not present  Homicidal Ideation:  Not present  Sensorium:  Alert  Orientation:  Person, place, time and situation  Immediate Recall, Recent, and Remote Memory:  Intact  Attention Span/ Concentration:  Good  Fund of Knowledge:  Appropriate for age and educational level  Intellectual Functioning:  Average range  Insight:  Good  Judgement:  Good  Reliability:  Good  Impulse Control:  Good       PHQ-9 Depression Screening    Little interest or pleasure in doing things? 0-->not at all   Feeling down, depressed, or hopeless? 0-->not at all   Trouble falling or staying asleep, or sleeping too much? 0-->not at all   Feeling tired or having little energy? 0-->not at all   Poor appetite or overeating? 0-->not at all   Feeling bad about yourself - or that you are a failure or have let yourself or your family down? 0-->not at all   Trouble concentrating on things, such as reading the newspaper or watching television? 0-->not at all   Moving or speaking so slowly that other people could have noticed? Or the opposite - being so fidgety or restless that you have been moving around a lot more than usual? 0-->not at all   Thoughts that you would be better off dead, or of hurting  yourself in some way? 0-->not at all   PHQ-9 Total Score 0   If you checked off any problems, how difficult have these problems made it for you to do your work, take care of things at home, or get along with other people? not difficult at all        Never smoker    I advised Augustina of the risks of tobacco use.     Result Review:    Labs:  No visits with results within 3 Month(s) from this visit.   Latest known visit with results is:   Orders Only on 01/11/2024   Component Date Value Ref Range Status    WBC 01/11/2024 4.01  3.40 - 10.80 10*3/mm3 Final    RBC 01/11/2024 3.58 (L)  3.77 - 5.28 10*6/mm3 Final    Hemoglobin 01/11/2024 11.6 (L)  12.0 - 15.9 g/dL Final    Hematocrit 01/11/2024 34.2  34.0 - 46.6 % Final    MCV 01/11/2024 95.5  79.0 - 97.0 fL Final    MCH 01/11/2024 32.4  26.6 - 33.0 pg Final    MCHC 01/11/2024 33.9  31.5 - 35.7 g/dL Final    RDW 01/11/2024 11.7 (L)  12.3 - 15.4 % Final    Platelets 01/11/2024 216  140 - 450 10*3/mm3 Final    Neutrophil Rel % 01/11/2024 53.4  42.7 - 76.0 % Final    Lymphocyte Rel % 01/11/2024 30.7  19.6 - 45.3 % Final    Monocyte Rel % 01/11/2024 11.2  5.0 - 12.0 % Final    Eosinophil Rel % 01/11/2024 3.5  0.3 - 6.2 % Final    Basophil Rel % 01/11/2024 1.0  0.0 - 1.5 % Final    Neutrophils Absolute 01/11/2024 2.14  1.70 - 7.00 10*3/mm3 Final    Lymphocytes Absolute 01/11/2024 1.23  0.70 - 3.10 10*3/mm3 Final    Monocytes Absolute 01/11/2024 0.45  0.10 - 0.90 10*3/mm3 Final    Eosinophils Absolute 01/11/2024 0.14  0.00 - 0.40 10*3/mm3 Final    Basophils Absolute 01/11/2024 0.04  0.00 - 0.20 10*3/mm3 Final    Immature Granulocyte Rel % 01/11/2024 0.2  0.0 - 0.5 % Final    Immature Grans Absolute 01/11/2024 0.01  0.00 - 0.05 10*3/mm3 Final    nRBC 01/11/2024 0.0  0.0 - 0.2 /100 WBC Final    Glucose 01/11/2024 94  65 - 99 mg/dL Final    BUN 01/11/2024 17  8 - 23 mg/dL Final    Creatinine 01/11/2024 1.08 (H)  0.57 - 1.00 mg/dL Final    EGFR Result 01/11/2024 56.1 (L)  >60.0  mL/min/1.73 Final    Comment: GFR Normal >60  Chronic Kidney Disease <60  Kidney Failure <15      BUN/Creatinine Ratio 01/11/2024 15.7  7.0 - 25.0 Final    Sodium 01/11/2024 136  136 - 145 mmol/L Final    Potassium 01/11/2024 4.2  3.5 - 5.2 mmol/L Final    Chloride 01/11/2024 101  98 - 107 mmol/L Final    Total CO2 01/11/2024 25.3  22.0 - 29.0 mmol/L Final    Calcium 01/11/2024 9.8  8.6 - 10.5 mg/dL Final    Total Protein 01/11/2024 7.6  6.0 - 8.5 g/dL Final    Albumin 01/11/2024 4.5  3.5 - 5.2 g/dL Final    Globulin 01/11/2024 3.1  gm/dL Final    A/G Ratio 01/11/2024 1.5  g/dL Final    Total Bilirubin 01/11/2024 0.4  0.0 - 1.2 mg/dL Final    Alkaline Phosphatase 01/11/2024 67  39 - 117 U/L Final    AST (SGOT) 01/11/2024 20  1 - 32 U/L Final    ALT (SGPT) 01/11/2024 17  1 - 33 U/L Final    Total Cholesterol 01/11/2024 193  0 - 200 mg/dL Final    Comment: Cholesterol Reference Ranges  (U.S. Department of Health and Human Services ATP III  Classifications)  Desirable          <200 mg/dL  Borderline High    200-239 mg/dL  High Risk          >240 mg/dL  Triglyceride Reference Ranges  (U.S. Department of Health and Human Services ATP III  Classifications)  Normal           <150 mg/dL  Borderline High  150-199 mg/dL  High             200-499 mg/dL  Very High        >500 mg/dL  HDL Reference Ranges  (U.S. Department of Health and Human Services ATP III  Classifications)  Low     <40 mg/dl (major risk factor for CHD)  High    >60 mg/dl ('negative' risk factor for CHD)  LDL Reference Ranges  (U.S. Department of Health and Human Services ATP III  Classifications)  Optimal          <100 mg/dL  Near Optimal     100-129 mg/dL  Borderline High  130-159 mg/dL  High             160-189 mg/dL  Very High        >189 mg/dL      Triglycerides 01/11/2024 82  0 - 150 mg/dL Final    HDL Cholesterol 01/11/2024 48  40 - 60 mg/dL Final    VLDL Cholesterol Abdiaziz 01/11/2024 15  5 - 40 mg/dL Final    LDL Chol Calc ([facility]) 01/11/2024 130 (H)  0 -  100 mg/dL Final    LDL/HDL RATIO 01/11/2024 2.68   Final    TSH 01/11/2024 4.020  0.270 - 4.200 uIU/mL Final    Hemoglobin A1C 01/11/2024 5.10  4.80 - 5.60 % Final    Comment: Hemoglobin A1C Ranges:  Increased Risk for Diabetes  5.7% to 6.4%  Diabetes                     >= 6.5%  Diabetic Goal                < 7.0%      Specific Gravity, UA 01/11/2024 1.014  1.005 - 1.030 Final    pH, UA 01/11/2024 6.0  5.0 - 8.0 Final    Color, UA 01/11/2024 Yellow   Final    REFERENCE RANGE: Yellow, Straw    Appearance, UA 01/11/2024 Clear  Clear Final    Leukocytes, UA 01/11/2024 Negative  Negative Final    Protein 01/11/2024 Negative  Negative Final    Glucose, UA 01/11/2024 Negative  Negative Final    Ketones 01/11/2024 Negative  Negative Final    Blood, UA 01/11/2024 Negative  Negative Final    Bilirubin, UA 01/11/2024 Negative  Negative Final    Urobilinogen, UA 01/11/2024 Comment   Final    Comment: 0.2 E.U./dL  REFERENCE RANGE: 0.2 - 1.0 E.U./dL      Nitrite, UA 01/11/2024 Negative  Negative Final    WBC, UA 01/11/2024 0-2  /HPF Final    REFERENCE RANGE: None Seen, 0-2    RBC, UA 01/11/2024 0-2  /HPF Final    REFERENCE RANGE: None Seen, 0-2    Epithelial Cells (non renal) 01/11/2024 0-2  /HPF Final    REFERENCE RANGE: None Seen, 0-2    Cast Type 01/11/2024 Comment   Final    HYALINE CASTS, UA            None Seen        /LPF       None Seen  N    Bacteria, UA 01/11/2024 Comment  None Seen /HPF Final    None Seen       Assessment & Plan   Diagnoses and all orders for this visit:    1. Major depressive disorder, recurrent episode, moderate (Primary)  -     escitalopram (LEXAPRO) 10 MG tablet; Take 1 tablet by mouth Daily.  Dispense: 90 tablet; Refill: 3    2. Generalized anxiety disorder  -     escitalopram (LEXAPRO) 10 MG tablet; Take 1 tablet by mouth Daily.  Dispense: 90 tablet; Refill: 3      Continue Lexapro 10mg daily.    Continue alprazolam 0.25mg daily PRN anxiety. Patient uses this rarely.     Visit Diagnoses:     ICD-10-CM ICD-9-CM   1. Major depressive disorder, recurrent episode, moderate  F33.1 296.32   2. Generalized anxiety disorder  F41.1 300.02     TREATMENT PLAN/GOALS: Continue supportive psychotherapy efforts and medications as indicated. Treatment and medication options discussed during today's visit. Patient ackowledged and verbally consented to continue with current treatment plan and was educated on the importance of compliance with treatment and follow-up appointments.    MEDICATION ISSUES:  INSPECT reviewed as expected    Discussed medication options and treatment plan of prescribed medication as well as the risks, benefits, and side effects including potential falls, possible impaired driving and metabolic adversities among others. Patient is agreeable to call the office with any worsening of symptoms or onset of side effects. Patient is agreeable to call 911 or go to the nearest ER should he/she begin having SI/HI. No medication side effects or related complaints today.     MEDS ORDERED DURING VISIT:  New Medications Ordered This Visit   Medications    escitalopram (LEXAPRO) 10 MG tablet     Sig: Take 1 tablet by mouth Daily.     Dispense:  90 tablet     Refill:  3       Return in about 6 months (around 12/19/2024).         This document has been electronically signed by PRICILLA Aragon  June 19, 2024 08:07 EDT    Part of this note may be an electronic transcription/translation of spoken language to printed text using the Dragon Dictation System.    Answers for HPI/ROS submitted by the patient on 3/14/2023  Please describe your symptoms.: History of depression, anxiety, at present doing ok, have been on lexapro a year.  Have you had these symptoms before?: Yes  How long have you been having these symptoms?: Greater than 2 weeks  Please list any medications you are currently taking for this condition.: Lexapro 10mg daily, Zanax .25 mg as needed for anxiety  Please describe any probable cause for  these symptoms. : Stress, anxiety  What is the primary reason for your visit?: Other

## 2024-06-19 ENCOUNTER — OFFICE VISIT (OUTPATIENT)
Dept: PSYCHIATRY | Facility: CLINIC | Age: 69
End: 2024-06-19
Payer: MEDICARE

## 2024-06-19 DIAGNOSIS — F33.1 MAJOR DEPRESSIVE DISORDER, RECURRENT EPISODE, MODERATE: Primary | Chronic | ICD-10-CM

## 2024-06-19 DIAGNOSIS — F41.1 GENERALIZED ANXIETY DISORDER: Chronic | ICD-10-CM

## 2024-06-19 PROCEDURE — 99214 OFFICE O/P EST MOD 30 MIN: CPT

## 2024-06-19 PROCEDURE — 1159F MED LIST DOCD IN RCRD: CPT

## 2024-06-19 PROCEDURE — 1160F RVW MEDS BY RX/DR IN RCRD: CPT

## 2024-06-19 RX ORDER — ESCITALOPRAM OXALATE 10 MG/1
10 TABLET ORAL DAILY
Qty: 90 TABLET | Refills: 3 | Status: SHIPPED | OUTPATIENT
Start: 2024-06-19

## 2024-06-28 ENCOUNTER — HOSPITAL ENCOUNTER (OUTPATIENT)
Facility: HOSPITAL | Age: 69
Discharge: HOME | End: 2024-06-28
Payer: MEDICARE

## 2024-06-28 DIAGNOSIS — Z78.0 MENOPAUSE: ICD-10-CM

## 2024-06-28 PROCEDURE — 77080 DXA BONE DENSITY AXIAL: CPT

## 2024-07-01 DIAGNOSIS — M85.80 OSTEOPENIA, UNSPECIFIED LOCATION: Primary | ICD-10-CM

## 2024-07-01 LAB
25(OH)D3+25(OH)D2 SERPL-MCNC: 32.4 NG/ML (ref 30–100)
ALBUMIN SERPL-MCNC: 4.5 G/DL (ref 3.5–5.2)
ALBUMIN/GLOB SERPL: 1.4 G/DL
ALP SERPL-CCNC: 71 U/L (ref 39–117)
ALT SERPL-CCNC: 16 U/L (ref 1–33)
AST SERPL-CCNC: 25 U/L (ref 1–32)
BILIRUB SERPL-MCNC: 0.3 MG/DL (ref 0–1.2)
BUN SERPL-MCNC: 14 MG/DL (ref 8–23)
BUN/CREAT SERPL: 14.9 (ref 7–25)
CALCIUM SERPL-MCNC: 9.5 MG/DL (ref 8.6–10.5)
CHLORIDE SERPL-SCNC: 102 MMOL/L (ref 98–107)
CO2 SERPL-SCNC: 26.7 MMOL/L (ref 22–29)
CREAT SERPL-MCNC: 0.94 MG/DL (ref 0.57–1)
EGFRCR SERPLBLD CKD-EPI 2021: 65.8 ML/MIN/1.73
GLOBULIN SER CALC-MCNC: 3.2 GM/DL
GLUCOSE SERPL-MCNC: 89 MG/DL (ref 65–99)
POTASSIUM SERPL-SCNC: 4.7 MMOL/L (ref 3.5–5.2)
PROT SERPL-MCNC: 7.7 G/DL (ref 6–8.5)
SODIUM SERPL-SCNC: 137 MMOL/L (ref 136–145)

## 2024-07-01 RX ORDER — ALENDRONATE SODIUM 70 MG/1
70 TABLET ORAL
Qty: 4 TABLET | Refills: 11 | Status: SHIPPED | OUTPATIENT
Start: 2024-07-01

## 2024-07-01 NOTE — PROGRESS NOTES
I discussed results with the patient.  Osteopenia with elevated FRAX numbers.  Treatment would be indicated.  Patient will come in for labs today.  I placed orders and patient is aware.  After I review the orders I will send in Fosamax.  We discussed she should take it once weekly with a full glass of water and not lie down or eat anything for 30 minutes.

## 2024-07-01 NOTE — PROGRESS NOTES
I discussed bone density results with the patient.  Her FRAX numbers are elevated which would indicate need for treatment.  Patient does need a creatinine, calcium and vitamin D level checked.  She will come into our office for labs today.  I have placed orders.  Patient is aware.  If those numbers look good we will start Fosamax.  We discussed she should take the Fosamax once weekly with a full glass of water and not lie down  or eat for 30 minutes.

## 2024-08-20 ENCOUNTER — OFFICE VISIT (OUTPATIENT)
Dept: FAMILY MEDICINE CLINIC | Facility: CLINIC | Age: 69
End: 2024-08-20
Payer: MEDICARE

## 2024-08-20 VITALS
RESPIRATION RATE: 16 BRPM | SYSTOLIC BLOOD PRESSURE: 150 MMHG | BODY MASS INDEX: 36.46 KG/M2 | HEART RATE: 73 BPM | OXYGEN SATURATION: 95 % | TEMPERATURE: 96.5 F | HEIGHT: 61 IN | WEIGHT: 193.1 LBS | DIASTOLIC BLOOD PRESSURE: 76 MMHG

## 2024-08-20 DIAGNOSIS — E78.00 HYPERCHOLESTEROLEMIA: Primary | ICD-10-CM

## 2024-08-20 DIAGNOSIS — R73.9 HYPERGLYCEMIA: ICD-10-CM

## 2024-08-20 DIAGNOSIS — E66.3 OVERWEIGHT (BMI 25.0-29.9): ICD-10-CM

## 2024-08-20 PROCEDURE — 1160F RVW MEDS BY RX/DR IN RCRD: CPT | Performed by: INTERNAL MEDICINE

## 2024-08-20 PROCEDURE — 1159F MED LIST DOCD IN RCRD: CPT | Performed by: INTERNAL MEDICINE

## 2024-08-20 PROCEDURE — 99213 OFFICE O/P EST LOW 20 MIN: CPT | Performed by: INTERNAL MEDICINE

## 2024-08-20 PROCEDURE — 1126F AMNT PAIN NOTED NONE PRSNT: CPT | Performed by: INTERNAL MEDICINE

## 2024-08-20 NOTE — PROGRESS NOTES
Subjective   Augustina Rodgers is a 69 y.o. female. Patient is here today for   Chief Complaint   Patient presents with    Hyperlipidemia        History of Present Illness  This patient is here for a follow-up visit to review labs.      Hyperlipidemia      History of Present Illness        Vitals:    08/20/24 0752   BP: 150/76   Pulse: 73   Resp: 16   Temp: 96.5 °F (35.8 °C)   SpO2: 95%     Body mass index is 35.9 kg/m².    Past Medical History:   Diagnosis Date    Anxiety     Arthritis 2018    Asthma     Cataract 2017    Colon polyp 2010    COVID-19 12/2021    Depression     Family history of colon cancer 02/08/2017    Father    GERD (gastroesophageal reflux disease) 2022    Heart murmur     History of colon polyps     Dr. Robles    History of medical problems     2019 sjolgrins    Hyperplastic colon polyp     Internal hemorrhoids     Medicare annual wellness visit, subsequent 11/20/2020    Mitral valve regurgitation 04/03/2012    mild to moderate    Osteopenia 2020    Panic disorder 1988    Posterior vitreous detachment of right eye 09/2016    Dr. Bev Wilder    Psychiatric illness 1988    Depression    Rosacea     Sjogren's disease     Tricuspid regurgitation 04/03/2012    mild to moderate      Allergies   Allergen Reactions    Ji-1 [Lidocaine] Other (See Comments)     Elevates heart rate    Cephalosporins Other (See Comments)     Elevates heart rate    Procaine Palpitations      Social History     Socioeconomic History    Marital status:    Tobacco Use    Smoking status: Never     Passive exposure: Never    Smokeless tobacco: Never   Vaping Use    Vaping status: Never Used   Substance and Sexual Activity    Alcohol use: Yes     Comment: Occ gin tonic    Drug use: No    Sexual activity: Not Currently     Partners: Male     Birth control/protection: Post-menopausal        Current Outpatient Medications:     alendronate (Fosamax) 70 MG tablet, Take 1 tablet by mouth Every 7 (Seven) Days., Disp: 4 tablet,  Rfl: 11    ALPRAZolam (Xanax) 0.25 MG tablet, Take 1 tablet by mouth Daily As Needed for Anxiety., Disp: 30 tablet, Rfl: 0    Calcium-Magnesium-Vitamin D (CALCIUM 1200+D3 PO), , Disp: , Rfl:     Cholecalciferol (Vitamin D) 50 MCG (2000 UT) capsule, , Disp: , Rfl:     escitalopram (LEXAPRO) 10 MG tablet, Take 1 tablet by mouth Daily., Disp: 90 tablet, Rfl: 3    Multiple Vitamins-Minerals (MULTIVITAMIN ADULT PO), Take  by mouth., Disp: , Rfl:     omeprazole OTC (PriLOSEC OTC) 20 MG EC tablet, , Disp: , Rfl:     Oxymetazoline HCl 1 % cream, With Aloe, Disp: , Rfl:     Plaquenil 200 MG tablet, 1 1/2 tablets/ day, Disp: , Rfl:      Objective     Review of Systems   Constitutional: Negative.    HENT: Negative.     Respiratory: Negative.     Cardiovascular: Negative.    Musculoskeletal: Negative.    Psychiatric/Behavioral: Negative.         Physical Exam  Vitals and nursing note reviewed.   Constitutional:       Comments: Pleasant, neatly groomed, no distress.  BMI 35.9.   Neck:      Vascular: No carotid bruit.   Cardiovascular:      Rate and Rhythm: Regular rhythm.      Heart sounds: Normal heart sounds. No murmur heard.     No gallop.   Pulmonary:      Effort: No respiratory distress.      Breath sounds: Normal breath sounds. No wheezing or rales.   Neurological:      Mental Status: She is oriented to person, place, and time.   Psychiatric:         Mood and Affect: Mood normal.         Behavior: Behavior normal.         Thought Content: Thought content normal.       Physical Exam      Results      Assessment & Plan    Problems Addressed this Visit          Cardiac and Vasculature    Hypercholesterolemia - Primary       Endocrine and Metabolic    Overweight (BMI 25.0-29.9)    Hyperglycemia     Diagnoses         Codes Comments    Hypercholesterolemia    -  Primary ICD-10-CM: E78.00  ICD-9-CM: 272.0     Overweight (BMI 25.0-29.9)     ICD-10-CM: E66.3  ICD-9-CM: 278.02     Hyperglycemia     ICD-10-CM: R73.9  ICD-9-CM: 790.29            Assessment & Plan  Her cholesterol is high.  Her LDL cholesterol is 140 and her total cholesterol is 209 with an HDL cholesterol 54.  She has normal triglycerides.  She had vascular screening 2 years ago and she had no carotid artery plaque no aortic aneurysm no peripheral arterial disease.    Nevertheless I do wish her cholesterol looks better.  I think that she can do without a cholesterol-lowering medication for now but I have asked her to take a look into dietary changes to have a more plant-based diet with lower fat and cholesterol intake.    She is decided that she is going to be following up with a weight watchers plan.    She has hyperglycemia.    Overall I think she is doing very well.  Her principal medical issue is her obesity and she is going to be addressing that.    I asked her to follow-up in 3 to 4 months for a Medicare wellness visit.      No follow-ups on file.    Patient or patient representative verbalized consent for the use of Ambient Listening during the visit with  Dmitri Tavares MD for chart documentation. 8/20/2024  09:58 EDT  Answers submitted by the patient for this visit:  Other (Submitted on 8/13/2024)  Please describe your symptoms.: Physical  Have you had these symptoms before?: Yes  How long have you been having these symptoms?: Greater than 2 weeks  Please list any medications you are currently taking for this condition.: None  Please describe any probable cause for these symptoms. : None  Primary Reason for Visit (Submitted on 8/13/2024)  What is the primary reason for your visit?: Other

## 2024-08-22 ENCOUNTER — PATIENT MESSAGE (OUTPATIENT)
Dept: FAMILY MEDICINE CLINIC | Facility: CLINIC | Age: 69
End: 2024-08-22
Payer: MEDICARE

## 2024-09-25 ENCOUNTER — CLINICAL SUPPORT (OUTPATIENT)
Dept: FAMILY MEDICINE CLINIC | Facility: CLINIC | Age: 69
End: 2024-09-25
Payer: MEDICARE

## 2024-09-25 DIAGNOSIS — Z23 FLU VACCINE NEED: Primary | ICD-10-CM

## 2024-09-25 PROCEDURE — 90662 IIV NO PRSV INCREASED AG IM: CPT | Performed by: INTERNAL MEDICINE

## 2024-09-25 PROCEDURE — G0008 ADMIN INFLUENZA VIRUS VAC: HCPCS | Performed by: INTERNAL MEDICINE

## 2024-10-08 ENCOUNTER — TELEPHONE (OUTPATIENT)
Dept: FAMILY MEDICINE CLINIC | Facility: CLINIC | Age: 69
End: 2024-10-08

## 2024-10-08 NOTE — TELEPHONE ENCOUNTER
Caller: Augustina Rodgers    Relationship: Self    Best call back number: [phone]     What is the best time to reach you: MORNING    What was the call regarding: PATIENT IS REQUESTING TO KNOW IF DR ADAMS WOULD RECOMMEND SHE RECEIVE A COVID SHOT THIS YEAR.    Is it okay if the provider responds through MyChart: YES    PLEASE ADVISE PATIENT.

## 2024-11-25 DIAGNOSIS — F33.1 MAJOR DEPRESSIVE DISORDER, RECURRENT EPISODE, MODERATE: Chronic | ICD-10-CM

## 2024-11-25 DIAGNOSIS — F41.1 GENERALIZED ANXIETY DISORDER: Chronic | ICD-10-CM

## 2024-11-25 RX ORDER — ESCITALOPRAM OXALATE 10 MG/1
10 TABLET ORAL DAILY
Qty: 90 TABLET | Refills: 3 | OUTPATIENT
Start: 2024-11-25

## 2024-12-10 ENCOUNTER — OFFICE VISIT (OUTPATIENT)
Dept: PSYCHIATRY | Facility: CLINIC | Age: 69
End: 2024-12-10
Payer: MEDICARE

## 2024-12-10 DIAGNOSIS — F33.1 MAJOR DEPRESSIVE DISORDER, RECURRENT EPISODE, MODERATE: Primary | Chronic | ICD-10-CM

## 2024-12-10 DIAGNOSIS — F41.1 GENERALIZED ANXIETY DISORDER: Chronic | ICD-10-CM

## 2024-12-10 RX ORDER — NYSTATIN 100000 U/G
1 CREAM TOPICAL AS NEEDED
COMMUNITY
Start: 2024-10-11

## 2024-12-10 NOTE — PROGRESS NOTES
[facility]   [address], [address]  [place] [address]  [phone]  Maryellen Nguyen, MSN, APRN, PMHNP-BC    Subjective   Augustina Rodgers is a 69 y.o. female who presents today for follow-up for psychiatric medication management.     Chief Complaint:  Depression, anxiety     History of Present Illness:     Medication adjustments last visit:   Continue Lexapro 10mg daily.    Continue alprazolam 0.25mg daily PRN anxiety. Patient uses this rarely.     12/10/24: Patient here today for follow up. She is still on Lexapro 10mg.   She rarely takes the alprazolam, but takes it sometimes. Reports sometimes will take it for anxiety prior to a dentist appointment. She has not filled since September 2023.     She states she feels like mood is still doing well just with the Lexapro. She hosted her daughter for [employer]. Her second daughter is a physical therapist and was working on [employer].     Patient denies any SI/HI/AVH.     Will continue Lexapro 10mg daily and alprazolam as needed.     Will plan for 6 month follow up to reassess condition. Patient advised to call sooner if condition worsens.     6/19/24:Patient here for 6 months follow up.  She feels like medications are working well. She is using the Lexapro but rarely uses the alprazolam.   Denies any suicidal thoughts.   Family is doing well. They are going on a trip to [place] in July for 2 weeks. She is excited about this.   Advised to call when she needs her alprazolam filled. She does not need a fill at this time.     Patient presents with symptoms and behaviors that are consistent with the following DSM-5 diagnoses:  1. Major depressive disorder  2. Generalized anxiety     The following portions of the patient's history were reviewed and updated as appropriate: allergies, current medications, past family history, past medical history, past social history, past surgical history and problem list.    PAST  OUTPATIENT TREATMENT  Diagnosis treated:  Affective Disorder, Anxiety/Panic Disorder  Treatment Type:  Medication Management  Prior Psychiatric Medications:  Pamelor-effective.  Lexapro - effective.  Xanax - effective at half tab for acute anxiety.  Support Groups:  None  Sequelae Of Mental Disorder:  emotional distress    Interval History  Improved    Side Effects  None      Past Medical History:  Past Medical History:   Diagnosis Date    Anxiety     Arthritis 2018    Asthma     Cataract 2017    Colon polyp 2010    COVID-19 12/2021    Depression     Family history of colon cancer 02/08/2017    Father    GERD (gastroesophageal reflux disease) 2022    Heart murmur     History of colon polyps     Dr. Robles    History of medical problems     2019 sjolgrins    Hyperplastic colon polyp     Internal hemorrhoids     Medicare annual wellness visit, subsequent 11/20/2020    Mitral valve regurgitation 04/03/2012    mild to moderate    Osteopenia 2020    Panic disorder 1988    Posterior vitreous detachment of right eye 09/2016    Dr. Bve Wilder    Psychiatric illness 1988    Depression    Rosacea     Sjogren's disease     Tricuspid regurgitation 04/03/2012    mild to moderate       Social History:  Social History     Socioeconomic History    Marital status:    Tobacco Use    Smoking status: Never     Passive exposure: Never    Smokeless tobacco: Never   Vaping Use    Vaping status: Never Used   Substance and Sexual Activity    Alcohol use: Yes     Comment: Occ gin tonic    Drug use: No    Sexual activity: Not Currently     Partners: Male     Birth control/protection: Post-menopausal       Family History:  Family History   Problem Relation Age of Onset    Alzheimer's disease Mother     Hypertension Mother     COPD Mother     Arthritis Mother     Colon cancer Father 61    Hypertension Father     Anxiety disorder Father     Depression Father     Cancer Father     Colon polyps Brother     Breast cancer Paternal  Grandmother 80       Past Surgical History:  Past Surgical History:   Procedure Laterality Date    BREAST BIOPSY Right     BREAST CYST ASPIRATION       SECTION      COLONOSCOPY  2015    hyperplastic polyp    COLONOSCOPY  2011    [facility]    COLONOSCOPY  2003    inflamed tubulovillous polyp   [facility]    COLONOSCOPY N/A 2017    Procedure: COLONOSCOPY TO CECUM ANT TERMINAL ILEUM WITH COLD BIOPSY POLYPECTOMIES;  Surgeon: Xavier Robles MD;  Location:  [facility];  Service:     COLONOSCOPY  2017    Dr. Robles [facility] 3 polyps    COLONOSCOPY N/A 2019    Procedure: COLONOSCOPY into cecum and  TI with cold biopsy polypectomies;  Surgeon: Xavier Robles MD;  Location:  [facility];  Service: Gastroenterology    COLONOSCOPY N/A 2023    Procedure: COLONOSCOPY to cecum into TI with cold biopsy polypectomies;  Surgeon: Xavier Robles MD;  Location:  [facility];  Service: Gastroenterology;  Laterality: N/A;  pre- family hx colon cancer, personal hx colon polyps  post- polyps, diverticulosis, hemorrhoids    DILATATION AND CURETTAGE      at 50 y old    FERTILITY SURGERY      laproscopic    TONSILLECTOMY         Problem List:  Patient Active Problem List   Diagnosis    History of colon polyps    Family history of colon cancer    Anxiety    Recurrent major depression in complete remission    Colon polyp    FH: colon cancer    Overweight (BMI 25.0-29.9)    Hyperglycemia    Dry mouth    Screening for lipid disorders    Medicare annual wellness visit, subsequent    COVID-19    Cervical strain, acute    Right hip pain    Hypercholesterolemia    Sjogren's syndrome without extraglandular involvement    Osteopenia       Allergy:   Allergies   Allergen Reactions    Ji-1 [Lidocaine] Other (See Comments)     Elevates heart rate    Cephalosporins Other (See Comments)     Elevates heart rate    Procaine Palpitations        Discontinued Medications:  There are no discontinued  medications.        Current Medications:   Current Outpatient Medications   Medication Sig Dispense Refill    alendronate (Fosamax) 70 MG tablet Take 1 tablet by mouth Every 7 (Seven) Days. 4 tablet 11    ALPRAZolam (Xanax) 0.25 MG tablet Take 1 tablet by mouth Daily As Needed for Anxiety. 30 tablet 0    Calcium-Magnesium-Vitamin D (CALCIUM 1200+D3 PO)       Cholecalciferol (Vitamin D) 50 MCG (2000 UT) capsule       escitalopram (LEXAPRO) 10 MG tablet Take 1 tablet by mouth Daily. 90 tablet 3    Multiple Vitamins-Minerals (MULTIVITAMIN ADULT PO) Take  by mouth.      nystatin (MYCOSTATIN) 737938 UNIT/GM cream Apply 1 Application topically to the appropriate area as directed As Needed (rash).      omeprazole OTC (PriLOSEC OTC) 20 MG EC tablet       Plaquenil 200 MG tablet 1 1/2 tablets/ day      Oxymetazoline HCl 1 % cream With Aloe       No current facility-administered medications for this visit.     Physical Exam:   not currently breastfeeding.    MENTAL STATUS EXAM   General Appearance:  Cleanly groomed and dressed  Eye Contact:  Good eye contact  Attitude:  Cooperative  Motor Activity:  Normal gait, posture  Muscle Strength:  Normal  Speech:  Normal rate, tone, volume  Language:  Spontaneous  Mood and affect:  Normal, pleasant  Hopelessness:  Denies  Loneliness: Denies  Thought Process:  Logical and goal-directed  Associations/ Thought Content:  No delusions  Hallucinations:  None  Suicidal Ideations:  Not present  Homicidal Ideation:  Not present  Sensorium:  Alert  Orientation:  Person, place, time and situation  Immediate Recall, Recent, and Remote Memory:  Intact  Attention Span/ Concentration:  Good  Fund of Knowledge:  Appropriate for age and educational level  Intellectual Functioning:  Average range  Insight:  Good  Judgement:  Good  Reliability:  Good  Impulse Control:  Good    PHQ-9 Depression Screening  Little interest or pleasure in doing things? Not at all   Feeling down, depressed, or hopeless? Not  at all   PHQ-2 Total Score 0   Trouble falling or staying asleep, or sleeping too much? Not at all   Feeling tired or having little energy? Not at all   Poor appetite or overeating? Not at all   Feeling bad about yourself - or that you are a failure or have let yourself or your family down? Not at all   Trouble concentrating on things, such as reading the newspaper or watching television? Not at all   Moving or speaking so slowly that other people could have noticed? Or the opposite - being so fidgety or restless that you have been moving around a lot more than usual? Not at all   Thoughts that you would be better off dead, or of hurting yourself in some way? Not at all   PHQ-9 Total Score 0   If you checked off any problems, how difficult have these problems made it for you to do your work, take care of things at home, or get along with other people? Not difficult at all         GAD7 Documentation:  Feeling nervous, anxious or on edge     Not being able to stop or control worrying     Worrying too much about different things     Trouble relaxing     Being so restless that it is hard to sit still     Becoming easily annoyed or irritable     Feeling Afraid as if something awful might happen     BAUTISTA Total Score     How difficult have these problems made it for you?            Never smoker    I advised Augustina of the risks of tobacco use.     Result Review:    Labs:  No visits with results within 3 Month(s) from this visit.   Latest known visit with results is:   Appointment on 08/15/2024   Component Date Value Ref Range Status    WBC 08/15/2024 4.59  3.40 - 10.80 10*3/mm3 Final    RBC 08/15/2024 3.67 (L)  3.77 - 5.28 10*6/mm3 Final    Hemoglobin 08/15/2024 12.2  12.0 - 15.9 g/dL Final    Hematocrit 08/15/2024 36.7  34.0 - 46.6 % Final    MCV 08/15/2024 100.0 (H)  79.0 - 97.0 fL Final    MCH 08/15/2024 33.2 (H)  26.6 - 33.0 pg Final    MCHC 08/15/2024 33.2  31.5 - 35.7 g/dL Final    RDW 08/15/2024 12.0 (L)  12.3 - 15.4  % Final    Platelets 08/15/2024 212  140 - 450 10*3/mm3 Final    Neutrophil Rel % 08/15/2024 59.1  42.7 - 76.0 % Final    Lymphocyte Rel % 08/15/2024 24.2  19.6 - 45.3 % Final    Monocyte Rel % 08/15/2024 12.6 (H)  5.0 - 12.0 % Final    Eosinophil Rel % 08/15/2024 2.8  0.3 - 6.2 % Final    Basophil Rel % 08/15/2024 0.9  0.0 - 1.5 % Final    Neutrophils Absolute 08/15/2024 2.71  1.70 - 7.00 10*3/mm3 Final    Lymphocytes Absolute 08/15/2024 1.11  0.70 - 3.10 10*3/mm3 Final    Monocytes Absolute 08/15/2024 0.58  0.10 - 0.90 10*3/mm3 Final    Eosinophils Absolute 08/15/2024 0.13  0.00 - 0.40 10*3/mm3 Final    Basophils Absolute 08/15/2024 0.04  0.00 - 0.20 10*3/mm3 Final    Immature Granulocyte Rel % 08/15/2024 0.4  0.0 - 0.5 % Final    Immature Grans Absolute 08/15/2024 0.02  0.00 - 0.05 10*3/mm3 Final    nRBC 08/15/2024 0.0  0.0 - 0.2 /100 WBC Final    Glucose 08/15/2024 105 (H)  65 - 99 mg/dL Final    BUN 08/15/2024 20  8 - 23 mg/dL Final    Creatinine 08/15/2024 0.96  0.57 - 1.00 mg/dL Final    EGFR Result 08/15/2024 64.2  >60.0 mL/min/1.73 Final    Comment: GFR Normal >60  Chronic Kidney Disease <60  Kidney Failure <15      BUN/Creatinine Ratio 08/15/2024 20.8  7.0 - 25.0 Final    Sodium 08/15/2024 137  136 - 145 mmol/L Final    Potassium 08/15/2024 5.1  3.5 - 5.2 mmol/L Final    Chloride 08/15/2024 102  98 - 107 mmol/L Final    Total CO2 08/15/2024 25.6  22.0 - 29.0 mmol/L Final    Calcium 08/15/2024 9.3  8.6 - 10.5 mg/dL Final    Total Protein 08/15/2024 7.7  6.0 - 8.5 g/dL Final    Albumin 08/15/2024 4.4  3.5 - 5.2 g/dL Final    Globulin 08/15/2024 3.3  gm/dL Final    A/G Ratio 08/15/2024 1.3  g/dL Final    Total Bilirubin 08/15/2024 0.3  0.0 - 1.2 mg/dL Final    Alkaline Phosphatase 08/15/2024 64  39 - 117 U/L Final    AST (SGOT) 08/15/2024 25  1 - 32 U/L Final    ALT (SGPT) 08/15/2024 17  1 - 33 U/L Final    Total Cholesterol 08/15/2024 209 (H)  0 - 200 mg/dL Final    Comment: Cholesterol Reference  Ranges  (U.S. Department of Health and Human Services ATP III  Classifications)  Desirable          <200 mg/dL  Borderline High    200-239 mg/dL  High Risk          >240 mg/dL  Triglyceride Reference Ranges  (U.S. Department of Health and Human Services ATP III  Classifications)  Normal           <150 mg/dL  Borderline High  150-199 mg/dL  High             200-499 mg/dL  Very High        >500 mg/dL  HDL Reference Ranges  (U.S. Department of Health and Human Services ATP III  Classifications)  Low     <40 mg/dl (major risk factor for CHD)  High    >60 mg/dl ('negative' risk factor for CHD)  LDL Reference Ranges  (U.S. Department of Health and Human Services ATP III  Classifications)  Optimal          <100 mg/dL  Near Optimal     100-129 mg/dL  Borderline High  130-159 mg/dL  High             160-189 mg/dL  Very High        >189 mg/dL      Triglycerides 08/15/2024 83  0 - 150 mg/dL Final    HDL Cholesterol 08/15/2024 54  40 - 60 mg/dL Final    VLDL Cholesterol Abdiaziz 08/15/2024 15  5 - 40 mg/dL Final    LDL Chol Calc (NIH) 08/15/2024 140 (H)  0 - 100 mg/dL Final    LDL/HDL RATIO 08/15/2024 2.56   Final       Assessment & Plan   Diagnoses and all orders for this visit:    1. Major depressive disorder, recurrent episode, moderate (Primary)    2. Generalized anxiety disorder        Continue Lexapro 10mg daily.    Continue alprazolam 0.25mg daily PRN anxiety. Patient uses this rarely.     Visit Diagnoses:    ICD-10-CM ICD-9-CM   1. Major depressive disorder, recurrent episode, moderate  F33.1 296.32   2. Generalized anxiety disorder  F41.1 300.02       TREATMENT PLAN/GOALS: Continue supportive psychotherapy efforts and medications as indicated. Treatment and medication options discussed during today's visit. Patient ackowledged and verbally consented to continue with current treatment plan and was educated on the importance of compliance with treatment and follow-up appointments.    MEDICATION ISSUES:  INSPECT reviewed as  expected    Discussed medication options and treatment plan of prescribed medication as well as the risks, benefits, and side effects including potential falls, possible impaired driving and metabolic adversities among others. Patient is agreeable to call the office with any worsening of symptoms or onset of side effects. Patient is agreeable to call 911 or go to the nearest ER should he/she begin having SI/HI. No medication side effects or related complaints today.     MEDS ORDERED DURING VISIT:  No orders of the defined types were placed in this encounter.      Return in about 6 months (around 6/10/2025).         This document has been electronically signed by PRICILLA Aragon  December 10, 2024 08:52 EST    Part of this note may be an electronic transcription/translation of spoken language to printed text using the Dragon Dictation System.    Answers for HPI/ROS submitted by the patient on 3/14/2023  Please describe your symptoms.: History of depression, anxiety, at present doing ok, have been on lexapro a year.  Have you had these symptoms before?: Yes  How long have you been having these symptoms?: Greater than 2 weeks  Please list any medications you are currently taking for this condition.: Lexapro 10mg daily, Zanax .25 mg as needed for anxiety  Please describe any probable cause for these symptoms. : Stress, anxiety  What is the primary reason for your visit?: Other

## 2025-04-01 ENCOUNTER — OFFICE VISIT (OUTPATIENT)
Dept: FAMILY MEDICINE CLINIC | Facility: CLINIC | Age: 70
End: 2025-04-01
Payer: MEDICARE

## 2025-04-01 VITALS
RESPIRATION RATE: 16 BRPM | WEIGHT: 197 LBS | SYSTOLIC BLOOD PRESSURE: 128 MMHG | HEART RATE: 67 BPM | BODY MASS INDEX: 37.19 KG/M2 | DIASTOLIC BLOOD PRESSURE: 78 MMHG | TEMPERATURE: 96 F | HEIGHT: 61 IN | OXYGEN SATURATION: 96 %

## 2025-04-01 DIAGNOSIS — M35.00 SJOGREN'S SYNDROME WITHOUT EXTRAGLANDULAR INVOLVEMENT: ICD-10-CM

## 2025-04-01 DIAGNOSIS — D75.89 MACROCYTOSIS: Primary | ICD-10-CM

## 2025-04-01 DIAGNOSIS — Z00.00 MEDICARE ANNUAL WELLNESS VISIT, SUBSEQUENT: ICD-10-CM

## 2025-04-01 DIAGNOSIS — E66.9 OBESITY (BMI 35.0-39.9 WITHOUT COMORBIDITY): ICD-10-CM

## 2025-04-01 DIAGNOSIS — F33.42 RECURRENT MAJOR DEPRESSION IN COMPLETE REMISSION: Chronic | ICD-10-CM

## 2025-04-01 DIAGNOSIS — R68.2 DRY MOUTH: ICD-10-CM

## 2025-04-01 PROBLEM — E66.3 OVERWEIGHT (BMI 25.0-29.9): Status: RESOLVED | Noted: 2019-09-30 | Resolved: 2025-04-01

## 2025-04-01 NOTE — PROGRESS NOTES
Subjective   The ABCs of the Annual Wellness Visit  Medicare Wellness Visit      Augustina Rodgers is a 69 y.o. patient who presents for a Medicare Wellness Visit.    The following portions of the patient's history were reviewed and   updated as appropriate: allergies, current medications, past family history, past medical history, past social history, past surgical history, and problem list.    Compared to one year ago, the patient's physical   health is the same.  Compared to one year ago, the patient's mental   health is the same.    Recent Hospitalizations:  She was not admitted to the hospital during the last year.     Current Medical Providers:  Patient Care Team:  Dmitri Tavares MD as PCP - General (Internal Medicine)  Sean Camacho MD (Inactive) as Consulting Physician (Obstetrics and Gynecology)  Xavier Robles MD as Consulting Physician (Gastroenterology)  eBv Wilder MD as Consulting Physician (Ophthalmology)    Outpatient Medications Prior to Visit   Medication Sig Dispense Refill    alendronate (Fosamax) 70 MG tablet Take 1 tablet by mouth Every 7 (Seven) Days. 4 tablet 11    ALPRAZolam (Xanax) 0.25 MG tablet Take 1 tablet by mouth Daily As Needed for Anxiety. 30 tablet 0    Calcium-Magnesium-Vitamin D (CALCIUM 1200+D3 PO)       Cholecalciferol (Vitamin D) 50 MCG (2000 UT) capsule       escitalopram (LEXAPRO) 10 MG tablet Take 1 tablet by mouth Daily. 90 tablet 3    Multiple Vitamins-Minerals (MULTIVITAMIN ADULT PO) Take  by mouth.      nystatin (MYCOSTATIN) 419381 UNIT/GM cream Apply 1 Application topically to the appropriate area as directed As Needed (rash).      omeprazole OTC (PriLOSEC OTC) 20 MG EC tablet       Oxymetazoline HCl 1 % cream With Aloe      Plaquenil 200 MG tablet 1 1/2 tablets/ day       No facility-administered medications prior to visit.     No opioid medication identified on active medication list. I have reviewed chart for other potential  high risk medication/s and  "harmful drug interactions in the elderly.      Aspirin is not on active medication list.  Aspirin use is not indicated based on review of current medical condition/s. Risk of harm outweighs potential benefits.  .    Patient Active Problem List   Diagnosis    History of colon polyps    Family history of colon cancer    Anxiety    Recurrent major depression in complete remission    Colon polyp    FH: colon cancer    Hyperglycemia    Dry mouth    Screening for lipid disorders    Medicare annual wellness visit, subsequent    COVID-19    Cervical strain, acute    Right hip pain    Hypercholesterolemia    Sjogren's syndrome without extraglandular involvement    Osteopenia    Macrocytosis    Obesity (BMI 35.0-39.9 without comorbidity)     Advance Care Planning Advance Directive is on file.  ACP discussion was held with the patient during this visit. Patient has an advance directive in EMR which is still valid.             Objective   Vitals:    04/01/25 0801   BP: 128/78   BP Location: Right arm   Patient Position: Sitting   Cuff Size: Large Adult   Pulse: 67   Resp: 16   Temp: 96 °F (35.6 °C)   TempSrc: Temporal   SpO2: 96%   Weight: 89.4 kg (197 lb)   Height: 156.2 cm (61.5\")       Estimated body mass index is 36.62 kg/m² as calculated from the following:    Height as of this encounter: 156.2 cm (61.5\").    Weight as of this encounter: 89.4 kg (197 lb).    Class 2 Severe Obesity (BMI >=35 and <=39.9). Obesity-related health conditions include the following: none. Obesity is unchanged. BMI is is above average; BMI management plan is completed. We discussed portion control and increasing exercise.           Does the patient have evidence of cognitive impairment? No  Lab Results   Component Value Date    CHLPL 182 03/25/2025    TRIG 108 03/25/2025    HDL 51 03/25/2025     (H) 03/25/2025    VLDL 19 03/25/2025    HGBA1C 4.90 03/25/2025                                                                                      "           Health  Risk Assessment    Smoking Status:  Social History     Tobacco Use   Smoking Status Never    Passive exposure: Never   Smokeless Tobacco Never     Alcohol Consumption:  Social History     Substance and Sexual Activity   Alcohol Use Yes    Comment: Occ gin tonic       Fall Risk Screen  STEADI Fall Risk Assessment was completed, and patient is at LOW risk for falls.Assessment completed on:4/1/2025    Depression Screening   Little interest or pleasure in doing things? Not at all   Feeling down, depressed, or hopeless? Not at all   PHQ-2 Total Score 0      Health Habits and Functional and Cognitive Screening:      3/25/2025    10:50 AM   Functional & Cognitive Status   Do you have difficulty preparing food and eating? No   Do you have difficulty bathing yourself, getting dressed or grooming yourself? No   Do you have difficulty using the toilet? No   Do you have difficulty moving around from place to place? No   Do you have trouble with steps or getting out of a bed or a chair? No   Current Diet Limited Junk Food   Dental Exam Up to date   Eye Exam Up to date   Exercise (times per week) 3 times per week   Current Exercises Include Home Fitness Gym;House Cleaning;Walking;Yard Work   Do you need help using the phone?  No   Are you deaf or do you have serious difficulty hearing?  No   Do you need help to go to places out of walking distance? No   Do you need help shopping? No   Do you need help preparing meals?  No   Do you need help with housework?  No   Do you need help with laundry? No   Do you need help taking your medications? No   Do you need help managing money? No   Do you ever drive or ride in a car without wearing a seat belt? No   Have you felt unusual stress, anger or loneliness in the last month? No   Who do you live with? Spouse   If you need help, do you have trouble finding someone available to you? No   Have you been bothered in the last four weeks by sexual problems? No   Do you have  difficulty concentrating, remembering or making decisions? No           Age-appropriate Screening Schedule:  Refer to the list below for future screening recommendations based on patient's age, sex and/or medical conditions. Orders for these recommended tests are listed in the plan section. The patient has been provided with a written plan.    Health Maintenance List  Health Maintenance   Topic Date Due    INFLUENZA VACCINE  07/01/2025    COVID-19 Vaccine (8 - 2024-25 season) 07/28/2025    LIPID PANEL  03/25/2026    ANNUAL WELLNESS VISIT  04/01/2026    COLORECTAL CANCER SCREENING  04/13/2026    MAMMOGRAM  04/15/2026    DXA SCAN  06/28/2026    TDAP/TD VACCINES (3 - Td or Tdap) 09/23/2028    HEPATITIS C SCREENING  Completed    Pneumococcal Vaccine 50+  Completed    ZOSTER VACCINE  Completed                                                                                                                                                CMS Preventative Services Quick Reference  Risk Factors Identified During Encounter  None Identified    The above risks/problems have been discussed with the patient.  Pertinent information has been shared with the patient in the After Visit Summary.  An After Visit Summary and PPPS were made available to the patient.    Follow Up:   Next Medicare Wellness visit to be scheduled in 1 year.         Additional E&M Note during same encounter follows:  Patient has additional, significant, and separately identifiable condition(s)/problem(s) that require work above and beyond the Medicare Wellness Visit     Chief Complaint  Medicare Wellness-subsequent    Subjective    HPI         The patient presents for a wellness visit.    She underwent colon cancer screening in 2002 and is scheduled for a repeat procedure in 2026, adhering to a 3-year interval. She received her COVID-19 vaccine in February. Her physical and emotional states remain unchanged compared to the previous year. She has not required  hospitalization within the past year. She maintains regular dental check-ups and recently had a cavity filled. She has an upcoming appointment for a crown placement. She also consults an ophthalmologist regularly and has a follow-up appointment scheduled for July or August. She engages in physical activity using a recumbent exercise bike. She reports no weight loss or cognitive issues. Her alcohol consumption is moderate, with a couple of drinks per week. She reports no current health concerns. Her dental hygienist suggested the possibility of an enlarged thyroid and the need for CPAP therapy, but she does not endorse any symptoms of obstructive sleep apnea. She typically retires to bed around 10:30 PM and wakes up at 7:00 AM, without any disturbances at night.    She is currently on alendronate and escitalopram, which she takes at night and finds effective. She was prescribed alprazolam in 2023, but it was a small dose of only 30 pills, and she does not use it frequently.    She continues to take Plaquenil, prescribed by her rheumatologist, Dr. Kareen Richards, for Sjogren's syndrome. She reports no issues with this medication. She experienced a rash on her face and scalp, accompanied by fever, which was diagnosed as rosacea following a biopsy. Lupus was ruled out as a cause. Since starting Plaquenil, she has only had one minor episode of the rash.    She experiences knee pain, which she attributes to her weight, and expresses a desire to lose weight.    SOCIAL HISTORY  She drinks alcohol a couple of times a week.    FAMILY HISTORY  Her mother had COPD from smoking. Her father broke his hip at 90 and had complications from COVID-19.    MEDICATIONS  Alendronate, escitalopram, alprazolam (as needed), Plaquenil    IMMUNIZATIONS  She received her COVID-19 vaccine in February.          Objective   Vital Signs:  /78 (BP Location: Right arm, Patient Position: Sitting, Cuff Size: Large Adult)   Pulse 67   Temp 96 °F  "(35.6 °C) (Temporal)   Resp 16   Ht 156.2 cm (61.5\")   Wt 89.4 kg (197 lb)   SpO2 96%   BMI 36.62 kg/m²   Physical Exam  Vitals and nursing note reviewed.   Constitutional:       General: She is not in acute distress.     Appearance: Normal appearance. She is obese. She is not ill-appearing, toxic-appearing or diaphoretic.      Comments: Pleasant, neatly groomed, no distress.   HENT:      Head: Normocephalic and atraumatic.      Right Ear: Tympanic membrane normal.      Left Ear: Tympanic membrane normal.      Nose: Nose normal.      Mouth/Throat:      Mouth: Mucous membranes are moist.   Eyes:      Extraocular Movements: Extraocular movements intact.      Conjunctiva/sclera: Conjunctivae normal.   Neck:      Vascular: No carotid bruit.      Comments: No thyromegaly  Cardiovascular:      Rate and Rhythm: Normal rate and regular rhythm.      Heart sounds: Normal heart sounds. No murmur heard.     No gallop.   Pulmonary:      Effort: No respiratory distress.      Breath sounds: Normal breath sounds. No wheezing or rales.   Abdominal:      General: Abdomen is flat.      Palpations: Abdomen is soft. There is no mass.      Tenderness: There is no abdominal tenderness. There is no guarding or rebound.      Hernia: No hernia is present.   Musculoskeletal:      Cervical back: No rigidity or tenderness.   Lymphadenopathy:      Cervical: No cervical adenopathy.   Neurological:      Mental Status: She is alert and oriented to person, place, and time.   Psychiatric:         Mood and Affect: Mood normal.         Behavior: Behavior normal.         Thought Content: Thought content normal.           Oral exam was performed.  No thyroid enlargement detected.  Carotid arteries are clean.  Lungs were auscultated.  Heart sounds are normal. No murmurs detected.  Liver feels normal.            Results  Laboratory Studies  BUN creatinine ratio is a little high. Blood urea nitrogen and creatinine are normal. Hemoglobin A1c is 4.9. LDL " cholesterol is 112. Vitamin D was low normal in July last year. RDW is always low. MCV is slightly high.    Imaging  Vascular screening in 2023 showed normal results.           Assessment and Plan        1. Health maintenance.  Her BUN creatinine ratio is slightly elevated, but both her blood urea nitrogen and creatinine levels are within normal limits. Her hemoglobin A1c is 4.9%, indicating good glycemic control. Her LDL cholesterol has improved from 140 in August 2024 to 112 currently. Her vitamin D level was low normal in July 2024, but she is taking supplemental vitamin D and calcium. She is not anemic, and her RDW is consistently low, which is a positive sign. Her MCV is slightly elevated, which could be due to B12 deficiencies or alcohol consumption. A B12 level will be checked to rule out any deficiencies. She is advised to continue her current regimen of supplemental vitamin D and calcium. A vascular screen will be conducted every couple of years to monitor her condition.    2. Macrocytosis.  Her MCV is slightly elevated, which could be due to B12 deficiencies or alcohol consumption. A B12 level will be checked to rule out any deficiencies. She will schedule an appointment for the lab work.    3. Sjogren's syndrome.  She is currently taking Plaquenil for Sjogren's syndrome, which is being managed by her rheumatologist, Dr. Kareen Richards. She reports that the medication is working well and has only had one minor bout of symptoms since starting the treatment.    4. Knee pain.  She reports knee pain, which she attributes to being overweight. It is recommended that she lose about 10% of her body weight to alleviate the knee pain.         Follow Up   No follow-ups on file.  Patient was given instructions and counseling regarding her condition or for health maintenance advice. Please see specific information pulled into the AVS if appropriate.  Patient or patient representative verbalized consent for the use of  [employer] during the visit with  Dmitri Tavares MD for chart documentation. 4/1/2025  09:23 EDT

## 2025-06-08 NOTE — PROGRESS NOTES
[facility]   [address], [address]  [place] [address]  [phone]  Maryellen Nguyen, MSN, APRN, PMHNP-BC    Subjective   Augustina Rodgers is a 70 y.o. female who presents today for follow-up for psychiatric medication management.     Chief Complaint:  Depression, anxiety     History of Present Illness:     Medication adjustments last visit:   Continue Lexapro 10mg daily.    Continue alprazolam 0.25mg daily PRN anxiety. Patient uses this rarely.     Patient or patient representative verbalized consent for the use of Ambient Listening during the visit with  PRICILLA Aragon for chart documentation. 6/10/2025  07:59 EDT  History of Present Illness  The patient is a 70-year-old female here for follow up for depression and anxiety.     Major Depressive Disorder  - States mood is stable on Lexapro 10mg.   -She is looking forward to a 3 week trip to [place] in July. Going with friend and then her daughter and grandkids will join [facility].   -Denies any SI/HI/AVH.     Generalized Anxiety Disorder  -States anxiety is generally under good control.   -She has a dentist appointment coming up she is anxious about, due to the novocaine causing her heart to race. Advised it was ok to take 1-2 of her alprazolam before the appointment. She rarely uses them.   -Will send new prescription in today, since previous one was from 2023.     Patient presents with symptoms and behaviors that are consistent with the following DSM-5 diagnoses:  1. Major depressive disorder  2. Generalized anxiety     The following portions of the patient's history were reviewed and updated as appropriate: allergies, current medications, past family history, past medical history, past social history, past surgical history and problem list.    PAST OUTPATIENT TREATMENT  Diagnosis treated:  Affective Disorder, Anxiety/Panic Disorder  Treatment Type:  Medication Management  Prior Psychiatric  Medications:  Pamelor-effective.  Lexapro - effective.  Xanax - effective at half tab for acute anxiety.  Support Groups:  None  Sequelae Of Mental Disorder:  emotional distress    Interval History  Improved    Side Effects  None      Past Medical History:  Past Medical History:   Diagnosis Date    Anxiety     Arthritis 2018    Asthma     Cataract 2017    Colon polyp 2010    COVID-19 12/2021    Depression     Family history of colon cancer 02/08/2017    Father    GERD (gastroesophageal reflux disease) 2022    Heart murmur     History of colon polyps     Dr. Robles    History of medical problems     2019 sjolgrins    Hyperplastic colon polyp     Internal hemorrhoids     Medicare annual wellness visit, subsequent 11/20/2020    Mitral valve regurgitation 04/03/2012    mild to moderate    Osteopenia 2020    Panic disorder 1988    Posterior vitreous detachment of right eye 09/2016    Dr. Bev Wilder    Psychiatric illness 1988    Depression    Rosacea     Sjogren's disease     Tricuspid regurgitation 04/03/2012    mild to moderate       Social History:  Social History     Socioeconomic History    Marital status:    Tobacco Use    Smoking status: Never     Passive exposure: Never    Smokeless tobacco: Never   Vaping Use    Vaping status: Never Used   Substance and Sexual Activity    Alcohol use: Yes     Comment: Occ gin tonic    Drug use: No    Sexual activity: Not Currently     Partners: Male     Birth control/protection: Post-menopausal       Family History:  Family History   Problem Relation Age of Onset    Alzheimer's disease Mother     Hypertension Mother     COPD Mother     Arthritis Mother     Colon cancer Father 61    Hypertension Father     Anxiety disorder Father     Depression Father     Cancer Father     Colon polyps Brother     Breast cancer Paternal Grandmother 80       Past Surgical History:  Past Surgical History:   Procedure Laterality Date    BREAST BIOPSY Right     BREAST CYST ASPIRATION        SECTION      COLONOSCOPY  2015    hyperplastic polyp    COLONOSCOPY  2011    [facility]    COLONOSCOPY  2003    inflamed tubulovillous polyp   [facility]    COLONOSCOPY N/A 2017    Procedure: COLONOSCOPY TO CECUM ANT TERMINAL ILEUM WITH COLD BIOPSY POLYPECTOMIES;  Surgeon: Xavier Robles MD;  Location:  [facility];  Service:     COLONOSCOPY  2017    Dr. Robles [facility] 3 polyps    COLONOSCOPY N/A 2019    Procedure: COLONOSCOPY into cecum and  TI with cold biopsy polypectomies;  Surgeon: Xavier Robles MD;  Location:  [facility];  Service: Gastroenterology    COLONOSCOPY N/A 2023    Procedure: COLONOSCOPY to cecum into TI with cold biopsy polypectomies;  Surgeon: Xavier Robles MD;  Location:  [facility];  Service: Gastroenterology;  Laterality: N/A;  pre- family hx colon cancer, personal hx colon polyps  post- polyps, diverticulosis, hemorrhoids    DILATATION AND CURETTAGE      at 50 y old    FERTILITY SURGERY      laproscopic    TONSILLECTOMY         Problem List:  Patient Active Problem List   Diagnosis    History of colon polyps    Family history of colon cancer    Anxiety    Recurrent major depression in complete remission    Colon polyp    FH: colon cancer    Hyperglycemia    Dry mouth    Screening for lipid disorders    Medicare annual wellness visit, subsequent    COVID-19    Cervical strain, acute    Right hip pain    Hypercholesterolemia    Sjogren's syndrome without extraglandular involvement    Osteopenia    Macrocytosis    Obesity (BMI 35.0-39.9 without comorbidity)       Allergy:   Allergies   Allergen Reactions    Ji-1 [Lidocaine] Other (See Comments)     Elevates heart rate    Cephalosporins Other (See Comments)     Elevates heart rate    Procaine Palpitations        Discontinued Medications:  Medications Discontinued During This Encounter   Medication Reason    ALPRAZolam (Xanax) 0.25 MG tablet Reorder    escitalopram (LEXAPRO) 10 MG tablet Reorder            Current Medications:   Current Outpatient Medications   Medication Sig Dispense Refill    ALPRAZolam (Xanax) 0.25 MG tablet Take 1 tablet by mouth Daily As Needed for Anxiety. 30 tablet 2    escitalopram (LEXAPRO) 10 MG tablet Take 1 tablet by mouth Daily. 90 tablet 3    alendronate (Fosamax) 70 MG tablet Take 1 tablet by mouth Every 7 (Seven) Days. 4 tablet 11    Calcium-Magnesium-Vitamin D (CALCIUM 1200+D3 PO)       Cholecalciferol (Vitamin D) 50 MCG (2000 UT) capsule       Multiple Vitamins-Minerals (MULTIVITAMIN ADULT PO) Take  by mouth.      nystatin (MYCOSTATIN) 640614 UNIT/GM cream Apply 1 Application topically to the appropriate area as directed As Needed (rash).      omeprazole OTC (PriLOSEC OTC) 20 MG EC tablet       Oxymetazoline HCl 1 % cream With Aloe      Plaquenil 200 MG tablet 1 1/2 tablets/ day       No current facility-administered medications for this visit.     Physical Exam:   not currently breastfeeding.    MENTAL STATUS EXAM   General Appearance:  Cleanly groomed and dressed  Eye Contact:  Good eye contact  Attitude:  Cooperative  Motor Activity:  Normal gait, posture  Muscle Strength:  Normal  Speech:  Normal rate, tone, volume  Language:  Spontaneous  Mood and affect:  Normal, pleasant  Hopelessness:  Denies  Loneliness: Denies  Thought Process:  Logical and goal-directed  Associations/ Thought Content:  No delusions  Hallucinations:  None  Suicidal Ideations:  Not present  Homicidal Ideation:  Not present  Sensorium:  Alert  Orientation:  Person, place, time and situation  Immediate Recall, Recent, and Remote Memory:  Intact  Attention Span/ Concentration:  Good  Fund of Knowledge:  Appropriate for age and educational level  Intellectual Functioning:  Average range  Insight:  Good  Judgement:  Good  Reliability:  Good  Impulse Control:  Good    PHQ-9 Depression Screening  Little interest or pleasure in doing things? Not at all   Feeling down, depressed, or hopeless? Not at all    PHQ-2 Total Score 0   Trouble falling or staying asleep, or sleeping too much? Not at all   Feeling tired or having little energy? Not at all   Poor appetite or overeating? Not at all   Feeling bad about yourself - or that you are a failure or have let yourself or your family down? Not at all   Trouble concentrating on things, such as reading the newspaper or watching television? Not at all   Moving or speaking so slowly that other people could have noticed? Or the opposite - being so fidgety or restless that you have been moving around a lot more than usual? Not at all   Thoughts that you would be better off dead, or of hurting yourself in some way? Not at all   PHQ-9 Total Score 0   If you checked off any problems, how difficult have these problems made it for you to do your work, take care of things at home, or get along with other people? Not difficult at all         GAD7 Documentation:  Feeling nervous, anxious or on edge 0   Not being able to stop or control worrying 0   Worrying too much about different things 0   Trouble relaxing 0   Being so restless that it is hard to sit still 0   Becoming easily annoyed or irritable 0   Feeling Afraid as if something awful might happen 0   BAUTISTA Total Score 0   How difficult have these problems made it for you? Not difficult at all          Never smoker    I advised Augustina of the risks of tobacco use.     Result Review:    Labs:  Results Encounter on 04/06/2025   Component Date Value Ref Range Status    Vitamin B-12 04/04/2025 1,032 (H)  211 - 946 pg/mL Final    Results may be falsely increased if patient taking Biotin.    Folate 04/04/2025 >20.00  4.78 - 24.20 ng/mL Final    Results may be falsely increased if patient taking Biotin.   Appointment on 03/25/2025   Component Date Value Ref Range Status    WBC 03/25/2025 4.43  3.40 - 10.80 10*3/mm3 Final    RBC 03/25/2025 3.70 (L)  3.77 - 5.28 10*6/mm3 Final    Hemoglobin 03/25/2025 12.2  12.0 - 15.9 g/dL Final     Hematocrit 03/25/2025 36.8  34.0 - 46.6 % Final    MCV 03/25/2025 99.5 (H)  79.0 - 97.0 fL Final    MCH 03/25/2025 33.0  26.6 - 33.0 pg Final    MCHC 03/25/2025 33.2  31.5 - 35.7 g/dL Final    RDW 03/25/2025 12.0 (L)  12.3 - 15.4 % Final    Platelets 03/25/2025 213  140 - 450 10*3/mm3 Final    Neutrophil Rel % 03/25/2025 60.8  42.7 - 76.0 % Final    Lymphocyte Rel % 03/25/2025 25.1  19.6 - 45.3 % Final    Monocyte Rel % 03/25/2025 9.9  5.0 - 12.0 % Final    Eosinophil Rel % 03/25/2025 2.9  0.3 - 6.2 % Final    Basophil Rel % 03/25/2025 1.1  0.0 - 1.5 % Final    Neutrophils Absolute 03/25/2025 2.69  1.70 - 7.00 10*3/mm3 Final    Lymphocytes Absolute 03/25/2025 1.11  0.70 - 3.10 10*3/mm3 Final    Monocytes Absolute 03/25/2025 0.44  0.10 - 0.90 10*3/mm3 Final    Eosinophils Absolute 03/25/2025 0.13  0.00 - 0.40 10*3/mm3 Final    Basophils Absolute 03/25/2025 0.05  0.00 - 0.20 10*3/mm3 Final    Immature Granulocyte Rel % 03/25/2025 0.2  0.0 - 0.5 % Final    Immature Grans Absolute 03/25/2025 0.01  0.00 - 0.05 10*3/mm3 Final    nRBC 03/25/2025 0.0  0.0 - 0.2 /100 WBC Final    Glucose 03/25/2025 86  65 - 99 mg/dL Final    BUN 03/25/2025 23  8 - 23 mg/dL Final    Creatinine 03/25/2025 0.91  0.57 - 1.00 mg/dL Final    EGFR Result 03/25/2025 68.4  >60.0 mL/min/1.73 Final    Comment: GFR Categories in Chronic Kidney Disease (CKD)/X09/  /X09/  GFR Category          GFR (mL/min/1.73)    Interpretation  G1/X09/                    90 or greater/X09/        Normal or high  (1)  G2//                    60-89                Mild decrease  (1)  G3a                   45-59                Mild to moderate  decrease  G3b                   30-44                Moderate to  severe decrease  G4                    15-29                Severe decrease  G5                    14 or less           Kidney failure//  /[id number]/  (1)In the absence of evidence of kidney disease, neither  GFR category G1 or G2 fulfill the criteria for  CKD.  eGFR calculation 2021 CKD-EPI creatinine equation, which  does not include race as a factor      BUN/Creatinine Ratio 03/25/2025 25.3 (H)  7.0 - 25.0 Final    Sodium 03/25/2025 138  136 - 145 mmol/L Final    Potassium 03/25/2025 4.5  3.5 - 5.2 mmol/L Final    Chloride 03/25/2025 103  98 - 107 mmol/L Final    Total CO2 03/25/2025 24.7  22.0 - 29.0 mmol/L Final    Calcium 03/25/2025 9.1  8.6 - 10.5 mg/dL Final    Total Protein 03/25/2025 7.2  6.0 - 8.5 g/dL Final    Albumin 03/25/2025 4.2  3.5 - 5.2 g/dL Final    Globulin 03/25/2025 3.0  gm/dL Final    A/G Ratio 03/25/2025 1.4  g/dL Final    Total Bilirubin 03/25/2025 0.2  0.0 - 1.2 mg/dL Final    Alkaline Phosphatase 03/25/2025 54  39 - 117 U/L Final    AST (SGOT) 03/25/2025 23  1 - 32 U/L Final    ALT (SGPT) 03/25/2025 16  1 - 33 U/L Final    Total Cholesterol 03/25/2025 182  0 - 200 mg/dL Final    Comment: Cholesterol Reference Ranges  (U.S. Department of Health and Human Services ATP III  Classifications)  Desirable          <200 mg/dL  Borderline High    200-239 mg/dL  High Risk          >240 mg/dL  Triglyceride Reference Ranges  (U.S. Department of Health and Human Services ATP III  Classifications)  Normal           <150 mg/dL  Borderline High  150-199 mg/dL  High             200-499 mg/dL  Very High        >500 mg/dL  HDL Reference Ranges  (U.S. Department of Health and Human Services ATP III  Classifications)  Low     <40 mg/dl (major risk factor for CHD)  High    >60 mg/dl ('negative' risk factor for CHD)  LDL Reference Ranges  (U.S. Department of Health and Human Services ATP III  Classifications)  Optimal          <100 mg/dL  Near Optimal     100-129 mg/dL  Borderline High  130-159 mg/dL  High             160-189 mg/dL  Very High        >189 mg/dL  LDL is calculated using the NIH LDL-C calculation.      Triglycerides 03/25/2025 108  0 - 150 mg/dL Final    HDL Cholesterol 03/25/2025 51  40 - 60 mg/dL Final    VLDL Cholesterol Abdiaziz 03/25/2025 19  5 - 40  mg/dL Final    LDL Chol Calc (NIH) 03/25/2025 112 (H)  0 - 100 mg/dL Final    Amphetamine, Urine Qual 03/25/2025 Negative  Hbxzdu=1715 ng/mL Final    Barbiturates Screen, Urine 03/25/2025 Negative  Qpyepw=915 ng/mL Final    Benzodiazepine Screen, Urine 03/25/2025 Negative  Fnbxkj=090 ng/mL Final    THC Screen, Urine 03/25/2025 Negative  Cutoff=20 ng/mL Final    Cocaine Screen, Urine 03/25/2025 Negative  Mjepdb=029 ng/mL Final    Opiate Screen, Urine 03/25/2025 Negative  Zslmig=467 ng/mL Final    Opiate test includes Codeine, Morphine, Hydromorphone, Hydrocodone.    Oxycodone/Oxymorphone, Urine 03/25/2025 Negative  Lzzlcv=194 ng/mL Final    Test includes Oxycodone and Oxymorphone    Phencyclidine (PCP), Urine 03/25/2025 Negative  Cutoff=25 ng/mL Final    Methadone Screen, Urine 03/25/2025 Negative  Ynfqyt=972 ng/mL Final    Propoxyphene Screen 03/25/2025 Negative  Gcaaib=676 ng/mL Final    Creatinine, Urine 03/25/2025 126.9  20.0 - 300.0 mg/dL Final    pH, UA 03/25/2025 5.1  4.5 - 8.9 Final    Please note 03/25/2025 Comment   Final    Comment: This assay provides a preliminary unconfirmed analytical test  result that may be suitable for clinical management of patients  in certain situations. Drug-test results should be interpreted  in the context of clinical information. Patient metabolic  variables, specific drug chemistry, and specimen  characteristics can affect test outcome. Technical consultation  is available if a test result is inconsistent with an expected  outcome.    Email:  [email or web address]    Phone:  [phone]      Hemoglobin A1C 03/25/2025 4.90  4.80 - 5.60 % Final    Comment: Hemoglobin A1C Ranges:  Increased Risk for Diabetes  5.7% to 6.4%  Diabetes                     >= 6.5%  Diabetic Goal                < 7.0%         Assessment & Plan   Diagnoses and all orders for this visit:    1. Major depressive disorder, recurrent episode, moderate (Primary)  -     escitalopram (LEXAPRO) 10  MG tablet; Take 1 tablet by mouth Daily.  Dispense: 90 tablet; Refill: 3    2. Generalized anxiety disorder  -     escitalopram (LEXAPRO) 10 MG tablet; Take 1 tablet by mouth Daily.  Dispense: 90 tablet; Refill: 3  -     ALPRAZolam (Xanax) 0.25 MG tablet; Take 1 tablet by mouth Daily As Needed for Anxiety.  Dispense: 30 tablet; Refill: 2        Continue Lexapro 10mg daily.    Continue alprazolam 0.25mg daily PRN anxiety. Patient uses this rarely. Sent updated prescription in, previous from 2023.     Visit Diagnoses:    ICD-10-CM ICD-9-CM   1. Major depressive disorder, recurrent episode, moderate  F33.1 296.32   2. Generalized anxiety disorder  F41.1 300.02         TREATMENT PLAN/GOALS: Continue supportive psychotherapy efforts and medications as indicated. Treatment and medication options discussed during today's visit. Patient ackowledged and verbally consented to continue with current treatment plan and was educated on the importance of compliance with treatment and follow-up appointments.    MEDICATION ISSUES:  INSPECT reviewed as expected    Discussed medication options and treatment plan of prescribed medication as well as the risks, benefits, and side effects including potential falls, possible impaired driving and metabolic adversities among others. Patient is agreeable to call the office with any worsening of symptoms or onset of side effects. Patient is agreeable to call 911 or go to the nearest ER should he/she begin having SI/HI. No medication side effects or related complaints today.     MEDS ORDERED DURING VISIT:  New Medications Ordered This Visit   Medications    escitalopram (LEXAPRO) 10 MG tablet     Sig: Take 1 tablet by mouth Daily.     Dispense:  90 tablet     Refill:  3    ALPRAZolam (Xanax) 0.25 MG tablet     Sig: Take 1 tablet by mouth Daily As Needed for Anxiety.     Dispense:  30 tablet     Refill:  2       No follow-ups on file.         This document has been electronically signed by  Maryellen Nguyen, APRN  Marleny 10, 2025 08:14 EDT    Part of this note may be an electronic transcription/translation of spoken language to printed text using the Dragon Dictation System.    Answers for HPI/ROS submitted by the patient on 3/14/2023  Please describe your symptoms.: History of depression, anxiety, at present doing ok, have been on lexapro a year.  Have you had these symptoms before?: Yes  How long have you been having these symptoms?: Greater than 2 weeks  Please list any medications you are currently taking for this condition.: Lexapro 10mg daily, Zanax .25 mg as needed for anxiety  Please describe any probable cause for these symptoms. : Stress, anxiety  What is the primary reason for your visit?: Other

## 2025-06-10 ENCOUNTER — OFFICE VISIT (OUTPATIENT)
Dept: PSYCHIATRY | Facility: CLINIC | Age: 70
End: 2025-06-10
Payer: MEDICARE

## 2025-06-10 DIAGNOSIS — F41.1 GENERALIZED ANXIETY DISORDER: Chronic | ICD-10-CM

## 2025-06-10 DIAGNOSIS — F33.1 MAJOR DEPRESSIVE DISORDER, RECURRENT EPISODE, MODERATE: Primary | Chronic | ICD-10-CM

## 2025-06-10 RX ORDER — ALPRAZOLAM 0.25 MG
0.25 TABLET ORAL DAILY PRN
Qty: 30 TABLET | Refills: 2 | Status: SHIPPED | OUTPATIENT
Start: 2025-06-10

## 2025-06-10 RX ORDER — ESCITALOPRAM OXALATE 10 MG/1
10 TABLET ORAL DAILY
Qty: 90 TABLET | Refills: 3 | Status: SHIPPED | OUTPATIENT
Start: 2025-06-10

## 2025-07-10 RX ORDER — ALENDRONATE SODIUM 70 MG/1
70 TABLET ORAL WEEKLY
Qty: 4 TABLET | Refills: 11 | Status: SHIPPED | OUTPATIENT
Start: 2025-07-10

## 2025-08-14 DIAGNOSIS — F33.1 MAJOR DEPRESSIVE DISORDER, RECURRENT EPISODE, MODERATE: Chronic | ICD-10-CM

## 2025-08-14 DIAGNOSIS — F41.1 GENERALIZED ANXIETY DISORDER: Chronic | ICD-10-CM

## 2025-08-15 RX ORDER — ESCITALOPRAM OXALATE 10 MG/1
10 TABLET ORAL DAILY
Qty: 90 TABLET | Refills: 0 | Status: SHIPPED | OUTPATIENT
Start: 2025-08-15

## (undated) DEVICE — CANN NASL CO2 TRULINK W/O2 A/

## (undated) DEVICE — TUBING, SUCTION, 1/4" X 10', STRAIGHT: Brand: MEDLINE

## (undated) DEVICE — LN SMPL CO2 SHTRM SD STREAM W/M LUER

## (undated) DEVICE — SENSR O2 OXIMAX FNGR A/ 18IN NONSTR

## (undated) DEVICE — SINGLE-USE BIOPSY FORCEPS: Brand: RADIAL JAW 4

## (undated) DEVICE — CANN O2 ETCO2 FITS ALL CONN CO2 SMPL A/ 7IN DISP LF

## (undated) DEVICE — KT ORCA ORCAPOD DISP STRL

## (undated) DEVICE — THE TORRENT IRRIGATION SCOPE CONNECTOR IS USED WITH THE TORRENT IRRIGATION TUBING TO PROVIDE IRRIGATION FLUIDS SUCH AS STERILE WATER DURING GASTROINTESTINAL ENDOSCOPIC PROCEDURES WHEN USED IN CONJUNCTION WITH AN IRRIGATION PUMP (OR ELECTROSURGICAL UNIT).: Brand: TORRENT

## (undated) DEVICE — Device: Brand: DEFENDO AIR/WATER/SUCTION AND BIOPSY VALVE

## (undated) DEVICE — ADAPT CLN BIOGUARD AIR/H2O DISP